# Patient Record
Sex: MALE | Race: WHITE | Employment: OTHER | URBAN - METROPOLITAN AREA
[De-identification: names, ages, dates, MRNs, and addresses within clinical notes are randomized per-mention and may not be internally consistent; named-entity substitution may affect disease eponyms.]

---

## 2018-06-08 PROBLEM — R53.83 MALAISE AND FATIGUE: Status: ACTIVE | Noted: 2018-06-08

## 2018-06-08 PROBLEM — E86.0 DEHYDRATION: Status: ACTIVE | Noted: 2018-06-08

## 2018-06-08 PROBLEM — J18.9 RIGHT MIDDLE LOBE PNEUMONIA: Status: ACTIVE | Noted: 2018-06-08

## 2018-06-08 PROBLEM — E87.5 HYPERKALEMIA: Status: ACTIVE | Noted: 2018-06-08

## 2018-06-08 PROBLEM — E11.10 DKA, TYPE 2, NOT AT GOAL (HCC): Status: ACTIVE | Noted: 2018-06-08

## 2018-06-08 PROBLEM — E87.1 HYPONATREMIA: Status: ACTIVE | Noted: 2018-06-08

## 2018-06-08 PROBLEM — R53.1 WEAKNESS: Status: ACTIVE | Noted: 2018-06-08

## 2018-06-08 PROBLEM — N17.9 AKI (ACUTE KIDNEY INJURY) (HCC): Status: ACTIVE | Noted: 2018-06-08

## 2018-06-08 PROBLEM — R53.81 MALAISE AND FATIGUE: Status: ACTIVE | Noted: 2018-06-08

## 2018-06-08 PROBLEM — E10.10 DKA, TYPE 1, NOT AT GOAL (HCC): Status: ACTIVE | Noted: 2018-06-08

## 2018-07-08 PROBLEM — E86.0 DEHYDRATION: Status: RESOLVED | Noted: 2018-06-08 | Resolved: 2018-07-08

## 2019-03-08 ENCOUNTER — APPOINTMENT (OUTPATIENT)
Dept: CT IMAGING | Age: 74
DRG: 871 | End: 2019-03-08
Payer: MEDICARE

## 2019-03-08 ENCOUNTER — HOSPITAL ENCOUNTER (INPATIENT)
Age: 74
LOS: 5 days | Discharge: HOME | DRG: 871 | End: 2019-03-13
Attending: EMERGENCY MEDICINE | Admitting: INTERNAL MEDICINE
Payer: MEDICARE

## 2019-03-08 ENCOUNTER — APPOINTMENT (OUTPATIENT)
Dept: GENERAL RADIOLOGY | Age: 74
DRG: 871 | End: 2019-03-08
Payer: MEDICARE

## 2019-03-08 DIAGNOSIS — A41.9 SEVERE SEPSIS (HCC): ICD-10-CM

## 2019-03-08 DIAGNOSIS — R53.1 GENERAL WEAKNESS: ICD-10-CM

## 2019-03-08 DIAGNOSIS — R65.20 SEVERE SEPSIS (HCC): ICD-10-CM

## 2019-03-08 DIAGNOSIS — G93.40 ACUTE ENCEPHALOPATHY: Primary | ICD-10-CM

## 2019-03-08 DIAGNOSIS — N17.9 AKI (ACUTE KIDNEY INJURY) (HCC): ICD-10-CM

## 2019-03-08 DIAGNOSIS — R91.1 PULMONARY NODULE: ICD-10-CM

## 2019-03-08 LAB
A/G RATIO: 0.6 (ref 1.1–2.2)
ALBUMIN SERPL-MCNC: 3.3 G/DL (ref 3.4–5)
ALP BLD-CCNC: 107 U/L (ref 40–129)
ALT SERPL-CCNC: 94 U/L (ref 10–40)
AMMONIA: 34 UMOL/L (ref 16–60)
AMPHETAMINE SCREEN, URINE: NORMAL
ANION GAP SERPL CALCULATED.3IONS-SCNC: 15 MMOL/L (ref 3–16)
AST SERPL-CCNC: 90 U/L (ref 15–37)
BARBITURATE SCREEN URINE: NORMAL
BASOPHILS ABSOLUTE: 0.1 K/UL (ref 0–0.2)
BASOPHILS RELATIVE PERCENT: 0.8 %
BENZODIAZEPINE SCREEN, URINE: NORMAL
BILIRUB SERPL-MCNC: 1.7 MG/DL (ref 0–1)
BILIRUBIN URINE: ABNORMAL
BLOOD, URINE: ABNORMAL
BUN BLDV-MCNC: 33 MG/DL (ref 7–20)
CALCIUM SERPL-MCNC: 9.2 MG/DL (ref 8.3–10.6)
CANNABINOID SCREEN URINE: NORMAL
CHLORIDE BLD-SCNC: 104 MMOL/L (ref 99–110)
CLARITY: ABNORMAL
CO2: 19 MMOL/L (ref 21–32)
COCAINE METABOLITE SCREEN URINE: NORMAL
COLOR: ABNORMAL
CREAT SERPL-MCNC: 1.4 MG/DL (ref 0.8–1.3)
EOSINOPHILS ABSOLUTE: 0 K/UL (ref 0–0.6)
EOSINOPHILS RELATIVE PERCENT: 0 %
EPITHELIAL CELLS, UA: 3 /HPF (ref 0–5)
ETHANOL: NORMAL MG/DL (ref 0–0.08)
GFR AFRICAN AMERICAN: >60
GFR NON-AFRICAN AMERICAN: 50
GLOBULIN: 5.7 G/DL
GLUCOSE BLD-MCNC: 153 MG/DL (ref 70–99)
GLUCOSE URINE: NEGATIVE MG/DL
GLUCOSE, CSF: 69 MG/DL (ref 40–80)
HCT VFR BLD CALC: 38.4 % (ref 40.5–52.5)
HEMOGLOBIN: 12.9 G/DL (ref 13.5–17.5)
HYALINE CASTS: 2 /LPF (ref 0–8)
INR BLD: 1.15 (ref 0.86–1.14)
KETONES, URINE: ABNORMAL MG/DL
LACTIC ACID: 1.9 MMOL/L (ref 0.4–2)
LEUKOCYTE ESTERASE, URINE: ABNORMAL
LIPASE: 9 U/L (ref 13–60)
LYMPHOCYTES ABSOLUTE: 0.7 K/UL (ref 1–5.1)
LYMPHOCYTES RELATIVE PERCENT: 10.8 %
Lab: NORMAL
MCH RBC QN AUTO: 32.5 PG (ref 26–34)
MCHC RBC AUTO-ENTMCNC: 33.5 G/DL (ref 31–36)
MCV RBC AUTO: 97 FL (ref 80–100)
METHADONE SCREEN, URINE: NORMAL
MICROSCOPIC EXAMINATION: YES
MONOCYTES ABSOLUTE: 0.6 K/UL (ref 0–1.3)
MONOCYTES RELATIVE PERCENT: 8 %
NEUTROPHILS ABSOLUTE: 5.6 K/UL (ref 1.7–7.7)
NEUTROPHILS RELATIVE PERCENT: 80.4 %
NITRITE, URINE: NEGATIVE
OPIATE SCREEN URINE: NORMAL
OXYCODONE URINE: NORMAL
PDW BLD-RTO: 14.5 % (ref 12.4–15.4)
PH UA: 6
PH UA: 6 (ref 5–8)
PHENCYCLIDINE SCREEN URINE: NORMAL
PLATELET # BLD: 75 K/UL (ref 135–450)
PLATELET SLIDE REVIEW: ABNORMAL
PMV BLD AUTO: 9.1 FL (ref 5–10.5)
POTASSIUM REFLEX MAGNESIUM: 4.8 MMOL/L (ref 3.5–5.1)
PRO-BNP: 930 PG/ML (ref 0–124)
PROPOXYPHENE SCREEN: NORMAL
PROTEIN UA: >=300 MG/DL
PROTHROMBIN TIME: 13.1 SEC (ref 9.8–13)
RAPID INFLUENZA  B AGN: NEGATIVE
RAPID INFLUENZA A AGN: NEGATIVE
RBC # BLD: 3.96 M/UL (ref 4.2–5.9)
RBC UA: 197 /HPF (ref 0–4)
REASON FOR REJECTION: NORMAL
REJECTED TEST: NORMAL
SLIDE REVIEW: ABNORMAL
SODIUM BLD-SCNC: 138 MMOL/L (ref 136–145)
SPECIFIC GRAVITY UA: 1.02 (ref 1–1.03)
TOTAL CK: 37 U/L (ref 39–308)
TOTAL PROTEIN: 9 G/DL (ref 6.4–8.2)
TROPONIN: <0.01 NG/ML
URINE REFLEX TO CULTURE: YES
URINE TYPE: ABNORMAL
UROBILINOGEN, URINE: 1 E.U./DL
WBC # BLD: 6.9 K/UL (ref 4–11)
WBC UA: 4 /HPF (ref 0–5)

## 2019-03-08 PROCEDURE — 89050 BODY FLUID CELL COUNT: CPT

## 2019-03-08 PROCEDURE — 2060000000 HC ICU INTERMEDIATE R&B

## 2019-03-08 PROCEDURE — 86789 WEST NILE VIRUS ANTIBODY: CPT

## 2019-03-08 PROCEDURE — 87804 INFLUENZA ASSAY W/OPTIC: CPT

## 2019-03-08 PROCEDURE — 74176 CT ABD & PELVIS W/O CONTRAST: CPT

## 2019-03-08 PROCEDURE — 86788 WEST NILE VIRUS AB IGM: CPT

## 2019-03-08 PROCEDURE — 87529 HSV DNA AMP PROBE: CPT

## 2019-03-08 PROCEDURE — 2580000003 HC RX 258: Performed by: EMERGENCY MEDICINE

## 2019-03-08 PROCEDURE — 96361 HYDRATE IV INFUSION ADD-ON: CPT

## 2019-03-08 PROCEDURE — 82550 ASSAY OF CK (CPK): CPT

## 2019-03-08 PROCEDURE — 85025 COMPLETE CBC W/AUTO DIFF WBC: CPT

## 2019-03-08 PROCEDURE — 82140 ASSAY OF AMMONIA: CPT

## 2019-03-08 PROCEDURE — 81001 URINALYSIS AUTO W/SCOPE: CPT

## 2019-03-08 PROCEDURE — 87070 CULTURE OTHR SPECIMN AEROBIC: CPT

## 2019-03-08 PROCEDURE — 83605 ASSAY OF LACTIC ACID: CPT

## 2019-03-08 PROCEDURE — 6370000000 HC RX 637 (ALT 250 FOR IP): Performed by: EMERGENCY MEDICINE

## 2019-03-08 PROCEDURE — 87040 BLOOD CULTURE FOR BACTERIA: CPT

## 2019-03-08 PROCEDURE — 71250 CT THORAX DX C-: CPT

## 2019-03-08 PROCEDURE — 87801 DETECT AGNT MULT DNA AMPLI: CPT

## 2019-03-08 PROCEDURE — 83690 ASSAY OF LIPASE: CPT

## 2019-03-08 PROCEDURE — 6360000002 HC RX W HCPCS: Performed by: EMERGENCY MEDICINE

## 2019-03-08 PROCEDURE — 84484 ASSAY OF TROPONIN QUANT: CPT

## 2019-03-08 PROCEDURE — 99291 CRITICAL CARE FIRST HOUR: CPT

## 2019-03-08 PROCEDURE — 70450 CT HEAD/BRAIN W/O DYE: CPT

## 2019-03-08 PROCEDURE — 87205 SMEAR GRAM STAIN: CPT

## 2019-03-08 PROCEDURE — 85610 PROTHROMBIN TIME: CPT

## 2019-03-08 PROCEDURE — 83880 ASSAY OF NATRIURETIC PEPTIDE: CPT

## 2019-03-08 PROCEDURE — 80307 DRUG TEST PRSMV CHEM ANLYZR: CPT

## 2019-03-08 PROCEDURE — 96365 THER/PROPH/DIAG IV INF INIT: CPT

## 2019-03-08 PROCEDURE — 80053 COMPREHEN METABOLIC PANEL: CPT

## 2019-03-08 PROCEDURE — 87086 URINE CULTURE/COLONY COUNT: CPT

## 2019-03-08 PROCEDURE — 82945 GLUCOSE OTHER FLUID: CPT

## 2019-03-08 PROCEDURE — 71045 X-RAY EXAM CHEST 1 VIEW: CPT

## 2019-03-08 PROCEDURE — G0480 DRUG TEST DEF 1-7 CLASSES: HCPCS

## 2019-03-08 PROCEDURE — 84157 ASSAY OF PROTEIN OTHER: CPT

## 2019-03-08 PROCEDURE — 93005 ELECTROCARDIOGRAM TRACING: CPT | Performed by: EMERGENCY MEDICINE

## 2019-03-08 RX ORDER — VANCOMYCIN HYDROCHLORIDE 1 G/200ML
15 INJECTION, SOLUTION INTRAVENOUS ONCE
Status: COMPLETED | OUTPATIENT
Start: 2019-03-08 | End: 2019-03-09

## 2019-03-08 RX ORDER — ACETAMINOPHEN 650 MG/1
650 SUPPOSITORY RECTAL ONCE
Status: COMPLETED | OUTPATIENT
Start: 2019-03-08 | End: 2019-03-08

## 2019-03-08 RX ORDER — LIDOCAINE HYDROCHLORIDE 10 MG/ML
INJECTION, SOLUTION EPIDURAL; INFILTRATION; INTRACAUDAL; PERINEURAL
Status: COMPLETED
Start: 2019-03-08 | End: 2019-03-09

## 2019-03-08 RX ORDER — 0.9 % SODIUM CHLORIDE 0.9 %
30 INTRAVENOUS SOLUTION INTRAVENOUS ONCE
Status: COMPLETED | OUTPATIENT
Start: 2019-03-08 | End: 2019-03-08

## 2019-03-08 RX ORDER — ACETAMINOPHEN 500 MG
1000 TABLET ORAL ONCE
Status: DISCONTINUED | OUTPATIENT
Start: 2019-03-08 | End: 2019-03-08

## 2019-03-08 RX ADMIN — SODIUM CHLORIDE 2124 ML: 9 INJECTION, SOLUTION INTRAVENOUS at 20:47

## 2019-03-08 RX ADMIN — ACETAMINOPHEN 650 MG: 650 SUPPOSITORY RECTAL at 20:51

## 2019-03-08 RX ADMIN — CEFEPIME HYDROCHLORIDE 2 G: 2 INJECTION, POWDER, FOR SOLUTION INTRAVENOUS at 23:40

## 2019-03-08 ASSESSMENT — PAIN DESCRIPTION - LOCATION: LOCATION: ABDOMEN

## 2019-03-08 ASSESSMENT — PAIN SCALES - WONG BAKER: WONGBAKER_NUMERICALRESPONSE: 2

## 2019-03-08 ASSESSMENT — PAIN DESCRIPTION - ORIENTATION: ORIENTATION: LOWER

## 2019-03-09 ENCOUNTER — APPOINTMENT (OUTPATIENT)
Dept: MRI IMAGING | Age: 74
DRG: 871 | End: 2019-03-09
Payer: MEDICARE

## 2019-03-09 PROBLEM — R50.9 FEVER: Status: ACTIVE | Noted: 2019-03-09

## 2019-03-09 PROBLEM — R31.9 HEMATURIA: Status: ACTIVE | Noted: 2019-03-09

## 2019-03-09 PROBLEM — D69.6 THROMBOCYTOPENIA (HCC): Status: ACTIVE | Noted: 2019-03-09

## 2019-03-09 LAB
A/G RATIO: 0.6 (ref 1.1–2.2)
ALBUMIN SERPL-MCNC: 3.1 G/DL (ref 3.4–5)
ALP BLD-CCNC: 103 U/L (ref 40–129)
ALT SERPL-CCNC: 82 U/L (ref 10–40)
ANION GAP SERPL CALCULATED.3IONS-SCNC: 13 MMOL/L (ref 3–16)
APPEARANCE CSF: ABNORMAL
APTT: 35.5 SEC (ref 26–36)
AST SERPL-CCNC: 77 U/L (ref 15–37)
BASOPHILS ABSOLUTE: 0 K/UL (ref 0–0.2)
BASOPHILS RELATIVE PERCENT: 0.7 %
BILIRUB SERPL-MCNC: 2.3 MG/DL (ref 0–1)
BLOOD SMEAR REVIEW: NORMAL
BUN BLDV-MCNC: 40 MG/DL (ref 7–20)
CALCIUM SERPL-MCNC: 8.7 MG/DL (ref 8.3–10.6)
CHLORIDE BLD-SCNC: 102 MMOL/L (ref 99–110)
CLOT EVALUATION CSF: ABNORMAL
CO2: 19 MMOL/L (ref 21–32)
COLOR CSF: ABNORMAL
CREAT SERPL-MCNC: 1.6 MG/DL (ref 0.8–1.3)
EKG ATRIAL RATE: 83 BPM
EKG DIAGNOSIS: NORMAL
EKG P AXIS: 52 DEGREES
EKG P-R INTERVAL: 122 MS
EKG Q-T INTERVAL: 424 MS
EKG QRS DURATION: 98 MS
EKG QTC CALCULATION (BAZETT): 498 MS
EKG R AXIS: 27 DEGREES
EKG T AXIS: 83 DEGREES
EKG VENTRICULAR RATE: 83 BPM
EOSINOPHILS ABSOLUTE: 0 K/UL (ref 0–0.6)
EOSINOPHILS RELATIVE PERCENT: 0.4 %
FIBRINOGEN: 313 MG/DL (ref 200–397)
GFR AFRICAN AMERICAN: 52
GFR NON-AFRICAN AMERICAN: 43
GLOBULIN: 5.5 G/DL
GLUCOSE BLD-MCNC: 146 MG/DL (ref 70–99)
GLUCOSE BLD-MCNC: 184 MG/DL (ref 70–99)
GLUCOSE BLD-MCNC: 191 MG/DL (ref 70–99)
GLUCOSE BLD-MCNC: 270 MG/DL (ref 70–99)
GLUCOSE BLD-MCNC: 275 MG/DL (ref 70–99)
GLUCOSE BLD-MCNC: 307 MG/DL (ref 70–99)
GLUCOSE BLD-MCNC: 416 MG/DL (ref 70–99)
GLUCOSE BLD-MCNC: 418 MG/DL (ref 70–99)
HCT VFR BLD CALC: 41.4 % (ref 40.5–52.5)
HEMATOLOGY PATH CONSULT: YES
HEMOGLOBIN: 13.8 G/DL (ref 13.5–17.5)
LACTATE DEHYDROGENASE: 209 U/L (ref 100–190)
LYMPHOCYTES ABSOLUTE: 0.9 K/UL (ref 1–5.1)
LYMPHOCYTES RELATIVE PERCENT: 17 %
MCH RBC QN AUTO: 32.7 PG (ref 26–34)
MCHC RBC AUTO-ENTMCNC: 33.4 G/DL (ref 31–36)
MCV RBC AUTO: 98.1 FL (ref 80–100)
MONOCYTES ABSOLUTE: 0.5 K/UL (ref 0–1.3)
MONOCYTES RELATIVE PERCENT: 9.7 %
NEUTROPHILS ABSOLUTE: 3.6 K/UL (ref 1.7–7.7)
NEUTROPHILS RELATIVE PERCENT: 72.2 %
NO DIFFERENTIAL CSF: ABNORMAL
PDW BLD-RTO: 14.2 % (ref 12.4–15.4)
PERFORMED ON: ABNORMAL
PLATELET # BLD: 67 K/UL (ref 135–450)
PLATELET SLIDE REVIEW: ABNORMAL
PMV BLD AUTO: 10 FL (ref 5–10.5)
POTASSIUM REFLEX MAGNESIUM: 4.4 MMOL/L (ref 3.5–5.1)
PROTEIN CSF: 43 MG/DL (ref 15–45)
RBC # BLD: 4.22 M/UL (ref 4.2–5.9)
RBC CSF: 1849 /CUMM
SLIDE REVIEW: ABNORMAL
SODIUM BLD-SCNC: 134 MMOL/L (ref 136–145)
TOTAL PROTEIN: 8.6 G/DL (ref 6.4–8.2)
TUBE NUMBER CSF: ABNORMAL
WBC # BLD: 5 K/UL (ref 4–11)
WBC CSF: 4 /CUMM (ref 0–5)

## 2019-03-09 PROCEDURE — 70553 MRI BRAIN STEM W/O & W/DYE: CPT

## 2019-03-09 PROCEDURE — 87581 M.PNEUMON DNA AMP PROBE: CPT

## 2019-03-09 PROCEDURE — 6360000002 HC RX W HCPCS: Performed by: EMERGENCY MEDICINE

## 2019-03-09 PROCEDURE — 85025 COMPLETE CBC W/AUTO DIFF WBC: CPT

## 2019-03-09 PROCEDURE — 85730 THROMBOPLASTIN TIME PARTIAL: CPT

## 2019-03-09 PROCEDURE — 86788 WEST NILE VIRUS AB IGM: CPT

## 2019-03-09 PROCEDURE — 85384 FIBRINOGEN ACTIVITY: CPT

## 2019-03-09 PROCEDURE — 2060000000 HC ICU INTERMEDIATE R&B

## 2019-03-09 PROCEDURE — 6370000000 HC RX 637 (ALT 250 FOR IP): Performed by: FAMILY MEDICINE

## 2019-03-09 PROCEDURE — 83615 LACTATE (LD) (LDH) ENZYME: CPT

## 2019-03-09 PROCEDURE — 83036 HEMOGLOBIN GLYCOSYLATED A1C: CPT

## 2019-03-09 PROCEDURE — 80053 COMPREHEN METABOLIC PANEL: CPT

## 2019-03-09 PROCEDURE — 82570 ASSAY OF URINE CREATININE: CPT

## 2019-03-09 PROCEDURE — 86789 WEST NILE VIRUS ANTIBODY: CPT

## 2019-03-09 PROCEDURE — 87633 RESP VIRUS 12-25 TARGETS: CPT

## 2019-03-09 PROCEDURE — 2580000003 HC RX 258: Performed by: FAMILY MEDICINE

## 2019-03-09 PROCEDURE — 87486 CHLMYD PNEUM DNA AMP PROBE: CPT

## 2019-03-09 PROCEDURE — 87798 DETECT AGENT NOS DNA AMP: CPT

## 2019-03-09 PROCEDURE — 6370000000 HC RX 637 (ALT 250 FOR IP): Performed by: NURSE PRACTITIONER

## 2019-03-09 PROCEDURE — 84300 ASSAY OF URINE SODIUM: CPT

## 2019-03-09 PROCEDURE — 2580000003 HC RX 258: Performed by: EMERGENCY MEDICINE

## 2019-03-09 PROCEDURE — 2580000003 HC RX 258: Performed by: INTERNAL MEDICINE

## 2019-03-09 PROCEDURE — 6360000004 HC RX CONTRAST MEDICATION: Performed by: INTERNAL MEDICINE

## 2019-03-09 PROCEDURE — 83010 ASSAY OF HAPTOGLOBIN QUANT: CPT

## 2019-03-09 PROCEDURE — 36415 COLL VENOUS BLD VENIPUNCTURE: CPT

## 2019-03-09 PROCEDURE — A9579 GAD-BASE MR CONTRAST NOS,1ML: HCPCS | Performed by: INTERNAL MEDICINE

## 2019-03-09 PROCEDURE — 93010 ELECTROCARDIOGRAM REPORT: CPT | Performed by: INTERNAL MEDICINE

## 2019-03-09 PROCEDURE — 6360000002 HC RX W HCPCS: Performed by: FAMILY MEDICINE

## 2019-03-09 PROCEDURE — 2500000003 HC RX 250 WO HCPCS

## 2019-03-09 PROCEDURE — 99223 1ST HOSP IP/OBS HIGH 75: CPT | Performed by: PSYCHIATRY & NEUROLOGY

## 2019-03-09 RX ORDER — SODIUM CHLORIDE 0.9 % (FLUSH) 0.9 %
10 SYRINGE (ML) INJECTION PRN
Status: DISCONTINUED | OUTPATIENT
Start: 2019-03-09 | End: 2019-03-13 | Stop reason: HOSPADM

## 2019-03-09 RX ORDER — MAGNESIUM SULFATE 1 G/100ML
1 INJECTION INTRAVENOUS PRN
Status: DISCONTINUED | OUTPATIENT
Start: 2019-03-09 | End: 2019-03-09 | Stop reason: SDUPTHER

## 2019-03-09 RX ORDER — POTASSIUM CHLORIDE 7.45 MG/ML
10 INJECTION INTRAVENOUS PRN
Status: DISCONTINUED | OUTPATIENT
Start: 2019-03-09 | End: 2019-03-09 | Stop reason: SDUPTHER

## 2019-03-09 RX ORDER — MAGNESIUM SULFATE 1 G/100ML
1 INJECTION INTRAVENOUS PRN
Status: DISCONTINUED | OUTPATIENT
Start: 2019-03-09 | End: 2019-03-13 | Stop reason: HOSPADM

## 2019-03-09 RX ORDER — SIMVASTATIN 40 MG
40 TABLET ORAL NIGHTLY
Status: DISCONTINUED | OUTPATIENT
Start: 2019-03-09 | End: 2019-03-13 | Stop reason: HOSPADM

## 2019-03-09 RX ORDER — DEXTROSE MONOHYDRATE 50 MG/ML
100 INJECTION, SOLUTION INTRAVENOUS PRN
Status: DISCONTINUED | OUTPATIENT
Start: 2019-03-09 | End: 2019-03-13 | Stop reason: HOSPADM

## 2019-03-09 RX ORDER — ONDANSETRON 2 MG/ML
4 INJECTION INTRAMUSCULAR; INTRAVENOUS EVERY 6 HOURS PRN
Status: DISCONTINUED | OUTPATIENT
Start: 2019-03-09 | End: 2019-03-09 | Stop reason: SDUPTHER

## 2019-03-09 RX ORDER — SODIUM CHLORIDE 0.9 % (FLUSH) 0.9 %
10 SYRINGE (ML) INJECTION EVERY 12 HOURS SCHEDULED
Status: DISCONTINUED | OUTPATIENT
Start: 2019-03-09 | End: 2019-03-13 | Stop reason: HOSPADM

## 2019-03-09 RX ORDER — POTASSIUM CHLORIDE 7.45 MG/ML
10 INJECTION INTRAVENOUS PRN
Status: DISCONTINUED | OUTPATIENT
Start: 2019-03-09 | End: 2019-03-13 | Stop reason: HOSPADM

## 2019-03-09 RX ORDER — PANTOPRAZOLE SODIUM 40 MG/1
40 TABLET, DELAYED RELEASE ORAL
Status: DISCONTINUED | OUTPATIENT
Start: 2019-03-10 | End: 2019-03-13 | Stop reason: HOSPADM

## 2019-03-09 RX ORDER — NICOTINE POLACRILEX 4 MG
15 LOZENGE BUCCAL PRN
Status: DISCONTINUED | OUTPATIENT
Start: 2019-03-09 | End: 2019-03-13 | Stop reason: HOSPADM

## 2019-03-09 RX ORDER — ONDANSETRON 2 MG/ML
4 INJECTION INTRAMUSCULAR; INTRAVENOUS EVERY 6 HOURS PRN
Status: DISCONTINUED | OUTPATIENT
Start: 2019-03-09 | End: 2019-03-13 | Stop reason: HOSPADM

## 2019-03-09 RX ORDER — DEXTROSE MONOHYDRATE 25 G/50ML
12.5 INJECTION, SOLUTION INTRAVENOUS PRN
Status: DISCONTINUED | OUTPATIENT
Start: 2019-03-09 | End: 2019-03-13 | Stop reason: HOSPADM

## 2019-03-09 RX ORDER — HYDRALAZINE HYDROCHLORIDE 20 MG/ML
10 INJECTION INTRAMUSCULAR; INTRAVENOUS EVERY 6 HOURS PRN
Status: DISCONTINUED | OUTPATIENT
Start: 2019-03-09 | End: 2019-03-13 | Stop reason: HOSPADM

## 2019-03-09 RX ADMIN — INSULIN GLARGINE 15 UNITS: 100 INJECTION, SOLUTION SUBCUTANEOUS at 21:41

## 2019-03-09 RX ADMIN — INSULIN LISPRO 6 UNITS: 100 INJECTION, SOLUTION INTRAVENOUS; SUBCUTANEOUS at 21:12

## 2019-03-09 RX ADMIN — INSULIN LISPRO 8 UNITS: 100 INJECTION, SOLUTION INTRAVENOUS; SUBCUTANEOUS at 18:23

## 2019-03-09 RX ADMIN — ACYCLOVIR SODIUM 710 MG: 50 INJECTION, SOLUTION INTRAVENOUS at 01:21

## 2019-03-09 RX ADMIN — Medication 1 G: at 12:52

## 2019-03-09 RX ADMIN — GADOTERIDOL 14 ML: 279.3 INJECTION, SOLUTION INTRAVENOUS at 10:35

## 2019-03-09 RX ADMIN — Medication 10 ML: at 21:41

## 2019-03-09 RX ADMIN — LIDOCAINE HYDROCHLORIDE 30 ML: 10 INJECTION, SOLUTION EPIDURAL; INFILTRATION; INTRACAUDAL; PERINEURAL at 00:07

## 2019-03-09 RX ADMIN — VANCOMYCIN HYDROCHLORIDE 1000 MG: 1 INJECTION, SOLUTION INTRAVENOUS at 00:09

## 2019-03-09 RX ADMIN — INSULIN LISPRO 6 UNITS: 100 INJECTION, SOLUTION INTRAVENOUS; SUBCUTANEOUS at 12:53

## 2019-03-09 RX ADMIN — INSULIN LISPRO 3 UNITS: 100 INJECTION, SOLUTION INTRAVENOUS; SUBCUTANEOUS at 21:41

## 2019-03-09 RX ADMIN — Medication 10 ML: at 10:36

## 2019-03-09 RX ADMIN — Medication 10 ML: at 11:06

## 2019-03-09 RX ADMIN — ACYCLOVIR SODIUM 725 MG: 50 INJECTION, SOLUTION INTRAVENOUS at 16:38

## 2019-03-09 ASSESSMENT — PAIN SCALES - GENERAL
PAINLEVEL_OUTOF10: 0

## 2019-03-10 LAB
ANION GAP SERPL CALCULATED.3IONS-SCNC: 13 MMOL/L (ref 3–16)
BUN BLDV-MCNC: 45 MG/DL (ref 7–20)
CALCIUM SERPL-MCNC: 8.4 MG/DL (ref 8.3–10.6)
CHLORIDE BLD-SCNC: 104 MMOL/L (ref 99–110)
CO2: 19 MMOL/L (ref 21–32)
CREAT SERPL-MCNC: 1.8 MG/DL (ref 0.8–1.3)
CREATININE URINE: 54.1 MG/DL (ref 39–259)
ESTIMATED AVERAGE GLUCOSE: 191.5 MG/DL
GFR AFRICAN AMERICAN: 45
GFR NON-AFRICAN AMERICAN: 37
GLUCOSE BLD-MCNC: 215 MG/DL (ref 70–99)
GLUCOSE BLD-MCNC: 222 MG/DL (ref 70–99)
GLUCOSE BLD-MCNC: 242 MG/DL (ref 70–99)
GLUCOSE BLD-MCNC: 261 MG/DL (ref 70–99)
GLUCOSE BLD-MCNC: 270 MG/DL (ref 70–99)
GLUCOSE BLD-MCNC: 309 MG/DL (ref 70–99)
GLUCOSE BLD-MCNC: 317 MG/DL (ref 70–99)
HAPTOGLOBIN: 71 MG/DL (ref 30–200)
HBA1C MFR BLD: 8.3 %
HCT VFR BLD CALC: 37.7 % (ref 40.5–52.5)
HEMOGLOBIN: 12.8 G/DL (ref 13.5–17.5)
MAGNESIUM: 1.9 MG/DL (ref 1.8–2.4)
MCH RBC QN AUTO: 32.1 PG (ref 26–34)
MCHC RBC AUTO-ENTMCNC: 33.9 G/DL (ref 31–36)
MCV RBC AUTO: 94.6 FL (ref 80–100)
PDW BLD-RTO: 14.1 % (ref 12.4–15.4)
PERFORMED ON: ABNORMAL
PLATELET # BLD: 78 K/UL (ref 135–450)
PMV BLD AUTO: 9.6 FL (ref 5–10.5)
POTASSIUM REFLEX MAGNESIUM: 3.5 MMOL/L (ref 3.5–5.1)
RBC # BLD: 3.98 M/UL (ref 4.2–5.9)
REPORT: NORMAL
REPORT: NORMAL
RESPIRATORY PANEL PCR: NORMAL
SODIUM BLD-SCNC: 136 MMOL/L (ref 136–145)
SODIUM URINE: 53 MMOL/L
TROPONIN: <0.01 NG/ML
TROPONIN: <0.01 NG/ML
URINE CULTURE, ROUTINE: NORMAL
WBC # BLD: 5.1 K/UL (ref 4–11)

## 2019-03-10 PROCEDURE — 6370000000 HC RX 637 (ALT 250 FOR IP): Performed by: INTERNAL MEDICINE

## 2019-03-10 PROCEDURE — 84484 ASSAY OF TROPONIN QUANT: CPT

## 2019-03-10 PROCEDURE — 6370000000 HC RX 637 (ALT 250 FOR IP): Performed by: NURSE PRACTITIONER

## 2019-03-10 PROCEDURE — 2060000000 HC ICU INTERMEDIATE R&B

## 2019-03-10 PROCEDURE — 97165 OT EVAL LOW COMPLEX 30 MIN: CPT

## 2019-03-10 PROCEDURE — 97161 PT EVAL LOW COMPLEX 20 MIN: CPT

## 2019-03-10 PROCEDURE — 99233 SBSQ HOSP IP/OBS HIGH 50: CPT | Performed by: PSYCHIATRY & NEUROLOGY

## 2019-03-10 PROCEDURE — 97535 SELF CARE MNGMENT TRAINING: CPT

## 2019-03-10 PROCEDURE — 80048 BASIC METABOLIC PNL TOTAL CA: CPT

## 2019-03-10 PROCEDURE — 93005 ELECTROCARDIOGRAM TRACING: CPT | Performed by: FAMILY MEDICINE

## 2019-03-10 PROCEDURE — 2580000003 HC RX 258: Performed by: FAMILY MEDICINE

## 2019-03-10 PROCEDURE — 2580000003 HC RX 258: Performed by: INTERNAL MEDICINE

## 2019-03-10 PROCEDURE — 97530 THERAPEUTIC ACTIVITIES: CPT

## 2019-03-10 PROCEDURE — 85027 COMPLETE CBC AUTOMATED: CPT

## 2019-03-10 PROCEDURE — 6370000000 HC RX 637 (ALT 250 FOR IP): Performed by: FAMILY MEDICINE

## 2019-03-10 PROCEDURE — 83735 ASSAY OF MAGNESIUM: CPT

## 2019-03-10 PROCEDURE — 6360000002 HC RX W HCPCS: Performed by: FAMILY MEDICINE

## 2019-03-10 PROCEDURE — 97116 GAIT TRAINING THERAPY: CPT

## 2019-03-10 PROCEDURE — 84153 ASSAY OF PSA TOTAL: CPT

## 2019-03-10 PROCEDURE — 92610 EVALUATE SWALLOWING FUNCTION: CPT

## 2019-03-10 PROCEDURE — 36415 COLL VENOUS BLD VENIPUNCTURE: CPT

## 2019-03-10 RX ORDER — SODIUM CHLORIDE 9 MG/ML
1000 INJECTION, SOLUTION INTRAVENOUS CONTINUOUS
Status: DISCONTINUED | OUTPATIENT
Start: 2019-03-10 | End: 2019-03-11

## 2019-03-10 RX ORDER — AMLODIPINE BESYLATE 5 MG/1
10 TABLET ORAL DAILY
Status: DISCONTINUED | OUTPATIENT
Start: 2019-03-10 | End: 2019-03-13 | Stop reason: HOSPADM

## 2019-03-10 RX ADMIN — INSULIN LISPRO 9 UNITS: 100 INJECTION, SOLUTION INTRAVENOUS; SUBCUTANEOUS at 12:18

## 2019-03-10 RX ADMIN — SIMVASTATIN 40 MG: 40 TABLET, FILM COATED ORAL at 21:32

## 2019-03-10 RX ADMIN — HYDRALAZINE HYDROCHLORIDE 10 MG: 20 INJECTION INTRAMUSCULAR; INTRAVENOUS at 08:24

## 2019-03-10 RX ADMIN — INSULIN LISPRO 6 UNITS: 100 INJECTION, SOLUTION INTRAVENOUS; SUBCUTANEOUS at 08:26

## 2019-03-10 RX ADMIN — ASPIRIN 325 MG: 325 TABLET, DELAYED RELEASE ORAL at 18:16

## 2019-03-10 RX ADMIN — SODIUM CHLORIDE 1000 ML: 9 INJECTION, SOLUTION INTRAVENOUS at 10:41

## 2019-03-10 RX ADMIN — SODIUM CHLORIDE 1000 ML: 9 INJECTION, SOLUTION INTRAVENOUS at 21:34

## 2019-03-10 RX ADMIN — Medication 10 ML: at 16:19

## 2019-03-10 RX ADMIN — INSULIN LISPRO 12 UNITS: 100 INJECTION, SOLUTION INTRAVENOUS; SUBCUTANEOUS at 17:43

## 2019-03-10 RX ADMIN — AMLODIPINE BESYLATE 10 MG: 5 TABLET ORAL at 21:32

## 2019-03-10 RX ADMIN — Medication 10 ML: at 21:33

## 2019-03-10 RX ADMIN — INSULIN LISPRO 6 UNITS: 100 INJECTION, SOLUTION INTRAVENOUS; SUBCUTANEOUS at 03:02

## 2019-03-10 RX ADMIN — Medication 10 ML: at 08:25

## 2019-03-10 RX ADMIN — ACYCLOVIR SODIUM 725 MG: 50 INJECTION, SOLUTION INTRAVENOUS at 05:11

## 2019-03-10 RX ADMIN — SIMVASTATIN 40 MG: 40 TABLET, FILM COATED ORAL at 00:17

## 2019-03-10 RX ADMIN — PANTOPRAZOLE SODIUM 40 MG: 40 TABLET, DELAYED RELEASE ORAL at 08:24

## 2019-03-10 RX ADMIN — Medication 10 ML: at 21:34

## 2019-03-10 RX ADMIN — Medication 1 G: at 12:33

## 2019-03-10 RX ADMIN — INSULIN LISPRO 5 UNITS: 100 INJECTION, SOLUTION INTRAVENOUS; SUBCUTANEOUS at 21:36

## 2019-03-10 RX ADMIN — INSULIN LISPRO 10 UNITS: 100 INJECTION, SOLUTION INTRAVENOUS; SUBCUTANEOUS at 00:15

## 2019-03-10 RX ADMIN — HYDRALAZINE HYDROCHLORIDE 10 MG: 20 INJECTION INTRAMUSCULAR; INTRAVENOUS at 16:19

## 2019-03-10 ASSESSMENT — PAIN SCALES - GENERAL
PAINLEVEL_OUTOF10: 0

## 2019-03-11 PROBLEM — E11.8 TYPE 2 DIABETES MELLITUS WITH COMPLICATION (HCC): Status: ACTIVE | Noted: 2018-06-08

## 2019-03-11 LAB
ANION GAP SERPL CALCULATED.3IONS-SCNC: 13 MMOL/L (ref 3–16)
BASOPHILS ABSOLUTE: 0 K/UL (ref 0–0.2)
BASOPHILS RELATIVE PERCENT: 1 %
BUN BLDV-MCNC: 40 MG/DL (ref 7–20)
CALCIUM SERPL-MCNC: 8.3 MG/DL (ref 8.3–10.6)
CHLORIDE BLD-SCNC: 108 MMOL/L (ref 99–110)
CO2: 19 MMOL/L (ref 21–32)
CREAT SERPL-MCNC: 1.6 MG/DL (ref 0.8–1.3)
CSF CULTURE: ABNORMAL
CSF CULTURE: ABNORMAL
EKG ATRIAL RATE: 82 BPM
EKG DIAGNOSIS: NORMAL
EKG P AXIS: 56 DEGREES
EKG P-R INTERVAL: 128 MS
EKG Q-T INTERVAL: 402 MS
EKG QRS DURATION: 100 MS
EKG QTC CALCULATION (BAZETT): 469 MS
EKG R AXIS: 38 DEGREES
EKG T AXIS: -14 DEGREES
EKG VENTRICULAR RATE: 82 BPM
EOSINOPHILS ABSOLUTE: 0.1 K/UL (ref 0–0.6)
EOSINOPHILS RELATIVE PERCENT: 2.5 %
GFR AFRICAN AMERICAN: 52
GFR NON-AFRICAN AMERICAN: 43
GLUCOSE BLD-MCNC: 168 MG/DL (ref 70–99)
GLUCOSE BLD-MCNC: 178 MG/DL (ref 70–99)
GLUCOSE BLD-MCNC: 184 MG/DL (ref 70–99)
GLUCOSE BLD-MCNC: 231 MG/DL (ref 70–99)
GLUCOSE BLD-MCNC: 246 MG/DL (ref 70–99)
GLUCOSE BLD-MCNC: 324 MG/DL (ref 70–99)
GRAM STAIN RESULT: ABNORMAL
HCT VFR BLD CALC: 35.3 % (ref 40.5–52.5)
HEMATOLOGY PATH CONSULT: NORMAL
HEMOGLOBIN: 11.8 G/DL (ref 13.5–17.5)
HERPES SIMPLEX VIRUS BY PCR: NOT DETECTED
HSV SOURCE: NORMAL
LYMPHOCYTES ABSOLUTE: 1 K/UL (ref 1–5.1)
LYMPHOCYTES RELATIVE PERCENT: 24.2 %
MCH RBC QN AUTO: 32.1 PG (ref 26–34)
MCHC RBC AUTO-ENTMCNC: 33.4 G/DL (ref 31–36)
MCV RBC AUTO: 96.1 FL (ref 80–100)
MONOCYTES ABSOLUTE: 0.4 K/UL (ref 0–1.3)
MONOCYTES RELATIVE PERCENT: 9.5 %
NEUTROPHILS ABSOLUTE: 2.5 K/UL (ref 1.7–7.7)
NEUTROPHILS RELATIVE PERCENT: 62.8 %
ORGANISM: ABNORMAL
PDW BLD-RTO: 14.5 % (ref 12.4–15.4)
PERFORMED ON: ABNORMAL
PLATELET # BLD: 88 K/UL (ref 135–450)
PMV BLD AUTO: 9.4 FL (ref 5–10.5)
POTASSIUM SERPL-SCNC: 3.5 MMOL/L (ref 3.5–5.1)
PROSTATE SPECIFIC ANTIGEN: 0.67 NG/ML (ref 0–4)
RBC # BLD: 3.68 M/UL (ref 4.2–5.9)
SODIUM BLD-SCNC: 140 MMOL/L (ref 136–145)
TROPONIN: <0.01 NG/ML
WBC # BLD: 4 K/UL (ref 4–11)

## 2019-03-11 PROCEDURE — 2580000003 HC RX 258: Performed by: INTERNAL MEDICINE

## 2019-03-11 PROCEDURE — 6360000002 HC RX W HCPCS: Performed by: FAMILY MEDICINE

## 2019-03-11 PROCEDURE — 6370000000 HC RX 637 (ALT 250 FOR IP): Performed by: FAMILY MEDICINE

## 2019-03-11 PROCEDURE — 85025 COMPLETE CBC W/AUTO DIFF WBC: CPT

## 2019-03-11 PROCEDURE — 80048 BASIC METABOLIC PNL TOTAL CA: CPT

## 2019-03-11 PROCEDURE — 99223 1ST HOSP IP/OBS HIGH 75: CPT | Performed by: INTERNAL MEDICINE

## 2019-03-11 PROCEDURE — 36415 COLL VENOUS BLD VENIPUNCTURE: CPT

## 2019-03-11 PROCEDURE — 2060000000 HC ICU INTERMEDIATE R&B

## 2019-03-11 PROCEDURE — 99232 SBSQ HOSP IP/OBS MODERATE 35: CPT | Performed by: PSYCHIATRY & NEUROLOGY

## 2019-03-11 PROCEDURE — 93010 ELECTROCARDIOGRAM REPORT: CPT | Performed by: INTERNAL MEDICINE

## 2019-03-11 PROCEDURE — 6370000000 HC RX 637 (ALT 250 FOR IP): Performed by: INTERNAL MEDICINE

## 2019-03-11 PROCEDURE — 84484 ASSAY OF TROPONIN QUANT: CPT

## 2019-03-11 RX ORDER — VANCOMYCIN HYDROCHLORIDE 1 G/200ML
1000 INJECTION, SOLUTION INTRAVENOUS EVERY 24 HOURS
Status: DISCONTINUED | OUTPATIENT
Start: 2019-03-11 | End: 2019-03-11

## 2019-03-11 RX ORDER — CARVEDILOL 6.25 MG/1
12.5 TABLET ORAL 2 TIMES DAILY WITH MEALS
Status: DISCONTINUED | OUTPATIENT
Start: 2019-03-11 | End: 2019-03-13 | Stop reason: HOSPADM

## 2019-03-11 RX ORDER — SODIUM CHLORIDE 9 MG/ML
1000 INJECTION, SOLUTION INTRAVENOUS CONTINUOUS
Status: DISCONTINUED | OUTPATIENT
Start: 2019-03-11 | End: 2019-03-13

## 2019-03-11 RX ADMIN — INSULIN LISPRO 6 UNITS: 100 INJECTION, SOLUTION INTRAVENOUS; SUBCUTANEOUS at 17:20

## 2019-03-11 RX ADMIN — CARVEDILOL 12.5 MG: 6.25 TABLET, FILM COATED ORAL at 17:24

## 2019-03-11 RX ADMIN — INSULIN LISPRO 3 UNITS: 100 INJECTION, SOLUTION INTRAVENOUS; SUBCUTANEOUS at 09:33

## 2019-03-11 RX ADMIN — HYDRALAZINE HYDROCHLORIDE 10 MG: 20 INJECTION INTRAMUSCULAR; INTRAVENOUS at 09:31

## 2019-03-11 RX ADMIN — CARVEDILOL 12.5 MG: 6.25 TABLET, FILM COATED ORAL at 12:54

## 2019-03-11 RX ADMIN — AMLODIPINE BESYLATE 10 MG: 5 TABLET ORAL at 21:33

## 2019-03-11 RX ADMIN — INSULIN LISPRO 6 UNITS: 100 INJECTION, SOLUTION INTRAVENOUS; SUBCUTANEOUS at 12:59

## 2019-03-11 RX ADMIN — SIMVASTATIN 40 MG: 40 TABLET, FILM COATED ORAL at 21:33

## 2019-03-11 RX ADMIN — Medication 10 ML: at 09:31

## 2019-03-11 RX ADMIN — PANTOPRAZOLE SODIUM 40 MG: 40 TABLET, DELAYED RELEASE ORAL at 06:00

## 2019-03-11 RX ADMIN — SODIUM CHLORIDE 1000 ML: 9 INJECTION, SOLUTION INTRAVENOUS at 07:21

## 2019-03-11 RX ADMIN — INSULIN LISPRO 6 UNITS: 100 INJECTION, SOLUTION INTRAVENOUS; SUBCUTANEOUS at 21:34

## 2019-03-11 ASSESSMENT — ENCOUNTER SYMPTOMS
EYE DISCHARGE: 0
COUGH: 0
WHEEZING: 0
BACK PAIN: 0
TROUBLE SWALLOWING: 0
DIARRHEA: 0
NAUSEA: 0
SORE THROAT: 0
RHINORRHEA: 0
SHORTNESS OF BREATH: 0
EYE REDNESS: 0
CONSTIPATION: 0
ABDOMINAL PAIN: 0

## 2019-03-11 ASSESSMENT — PAIN SCALES - GENERAL
PAINLEVEL_OUTOF10: 0

## 2019-03-12 LAB
A/G RATIO: 0.7 (ref 1.1–2.2)
ALBUMIN SERPL-MCNC: 2.5 G/DL (ref 3.4–5)
ALP BLD-CCNC: 80 U/L (ref 40–129)
ALT SERPL-CCNC: 61 U/L (ref 10–40)
ANION GAP SERPL CALCULATED.3IONS-SCNC: 10 MMOL/L (ref 3–16)
ANION GAP SERPL CALCULATED.3IONS-SCNC: 11 MMOL/L (ref 3–16)
AST SERPL-CCNC: 59 U/L (ref 15–37)
BASOPHILS ABSOLUTE: 0 K/UL (ref 0–0.2)
BASOPHILS RELATIVE PERCENT: 0.9 %
BILIRUB SERPL-MCNC: 0.7 MG/DL (ref 0–1)
BILIRUBIN DIRECT: 0.3 MG/DL (ref 0–0.3)
BILIRUBIN, INDIRECT: 0.4 MG/DL (ref 0–1)
BUN BLDV-MCNC: 38 MG/DL (ref 7–20)
BUN BLDV-MCNC: 39 MG/DL (ref 7–20)
CALCIUM SERPL-MCNC: 7.8 MG/DL (ref 8.3–10.6)
CALCIUM SERPL-MCNC: 8.2 MG/DL (ref 8.3–10.6)
CHLORIDE BLD-SCNC: 109 MMOL/L (ref 99–110)
CHLORIDE BLD-SCNC: 111 MMOL/L (ref 99–110)
CO2: 18 MMOL/L (ref 21–32)
CO2: 19 MMOL/L (ref 21–32)
CREAT SERPL-MCNC: 1.5 MG/DL (ref 0.8–1.3)
CREAT SERPL-MCNC: 1.5 MG/DL (ref 0.8–1.3)
CULTURE, BLOOD 2: ABNORMAL
EOSINOPHILS ABSOLUTE: 0.1 K/UL (ref 0–0.6)
EOSINOPHILS RELATIVE PERCENT: 3.4 %
FERRITIN: 256.6 NG/ML (ref 30–400)
GFR AFRICAN AMERICAN: 56
GFR AFRICAN AMERICAN: 56
GFR NON-AFRICAN AMERICAN: 46
GFR NON-AFRICAN AMERICAN: 46
GLOBULIN: 3.8 G/DL
GLUCOSE BLD-MCNC: 173 MG/DL (ref 70–99)
GLUCOSE BLD-MCNC: 225 MG/DL (ref 70–99)
GLUCOSE BLD-MCNC: 228 MG/DL (ref 70–99)
GLUCOSE BLD-MCNC: 232 MG/DL (ref 70–99)
GLUCOSE BLD-MCNC: 278 MG/DL (ref 70–99)
GLUCOSE BLD-MCNC: 289 MG/DL (ref 70–99)
GLUCOSE BLD-MCNC: 293 MG/DL (ref 70–99)
HAPTOGLOBIN: 69 MG/DL (ref 30–200)
HCT VFR BLD CALC: 31 % (ref 40.5–52.5)
HEMOGLOBIN: 10.5 G/DL (ref 13.5–17.5)
IRON SATURATION: 43 % (ref 20–50)
IRON: 89 UG/DL (ref 59–158)
LACTATE DEHYDROGENASE: 133 U/L (ref 100–190)
LYMPHOCYTES ABSOLUTE: 0.9 K/UL (ref 1–5.1)
LYMPHOCYTES RELATIVE PERCENT: 28.2 %
MCH RBC QN AUTO: 32.4 PG (ref 26–34)
MCHC RBC AUTO-ENTMCNC: 33.7 G/DL (ref 31–36)
MCV RBC AUTO: 96 FL (ref 80–100)
MONOCYTES ABSOLUTE: 0.3 K/UL (ref 0–1.3)
MONOCYTES RELATIVE PERCENT: 9.6 %
NEUTROPHILS ABSOLUTE: 1.8 K/UL (ref 1.7–7.7)
NEUTROPHILS RELATIVE PERCENT: 57.9 %
ORGANISM: ABNORMAL
ORGANISM: ABNORMAL
PDW BLD-RTO: 14.2 % (ref 12.4–15.4)
PERFORMED ON: ABNORMAL
PLATELET # BLD: 88 K/UL (ref 135–450)
PMV BLD AUTO: 9.4 FL (ref 5–10.5)
POTASSIUM SERPL-SCNC: 3.7 MMOL/L (ref 3.5–5.1)
POTASSIUM SERPL-SCNC: 3.8 MMOL/L (ref 3.5–5.1)
RBC # BLD: 3.23 M/UL (ref 4.2–5.9)
SODIUM BLD-SCNC: 138 MMOL/L (ref 136–145)
SODIUM BLD-SCNC: 140 MMOL/L (ref 136–145)
TOTAL IRON BINDING CAPACITY: 208 UG/DL (ref 260–445)
WBC # BLD: 3.2 K/UL (ref 4–11)

## 2019-03-12 PROCEDURE — 83010 ASSAY OF HAPTOGLOBIN QUANT: CPT

## 2019-03-12 PROCEDURE — 82248 BILIRUBIN DIRECT: CPT

## 2019-03-12 PROCEDURE — 2580000003 HC RX 258: Performed by: INTERNAL MEDICINE

## 2019-03-12 PROCEDURE — 6370000000 HC RX 637 (ALT 250 FOR IP): Performed by: INTERNAL MEDICINE

## 2019-03-12 PROCEDURE — 84165 PROTEIN E-PHORESIS SERUM: CPT

## 2019-03-12 PROCEDURE — 85025 COMPLETE CBC W/AUTO DIFF WBC: CPT

## 2019-03-12 PROCEDURE — 83550 IRON BINDING TEST: CPT

## 2019-03-12 PROCEDURE — 86334 IMMUNOFIX E-PHORESIS SERUM: CPT

## 2019-03-12 PROCEDURE — 36415 COLL VENOUS BLD VENIPUNCTURE: CPT

## 2019-03-12 PROCEDURE — 83615 LACTATE (LD) (LDH) ENZYME: CPT

## 2019-03-12 PROCEDURE — 99232 SBSQ HOSP IP/OBS MODERATE 35: CPT | Performed by: INTERNAL MEDICINE

## 2019-03-12 PROCEDURE — 83540 ASSAY OF IRON: CPT

## 2019-03-12 PROCEDURE — 82728 ASSAY OF FERRITIN: CPT

## 2019-03-12 PROCEDURE — 97110 THERAPEUTIC EXERCISES: CPT

## 2019-03-12 PROCEDURE — 6370000000 HC RX 637 (ALT 250 FOR IP): Performed by: FAMILY MEDICINE

## 2019-03-12 PROCEDURE — 84155 ASSAY OF PROTEIN SERUM: CPT

## 2019-03-12 PROCEDURE — 2060000000 HC ICU INTERMEDIATE R&B

## 2019-03-12 PROCEDURE — 97116 GAIT TRAINING THERAPY: CPT

## 2019-03-12 PROCEDURE — 80053 COMPREHEN METABOLIC PANEL: CPT

## 2019-03-12 PROCEDURE — 97530 THERAPEUTIC ACTIVITIES: CPT

## 2019-03-12 RX ADMIN — Medication 10 ML: at 20:31

## 2019-03-12 RX ADMIN — INSULIN LISPRO 3 UNITS: 100 INJECTION, SOLUTION INTRAVENOUS; SUBCUTANEOUS at 09:58

## 2019-03-12 RX ADMIN — INSULIN LISPRO 6 UNITS: 100 INJECTION, SOLUTION INTRAVENOUS; SUBCUTANEOUS at 17:32

## 2019-03-12 RX ADMIN — PANTOPRAZOLE SODIUM 40 MG: 40 TABLET, DELAYED RELEASE ORAL at 06:25

## 2019-03-12 RX ADMIN — CARVEDILOL 12.5 MG: 6.25 TABLET, FILM COATED ORAL at 09:59

## 2019-03-12 RX ADMIN — INSULIN LISPRO 5 UNITS: 100 INJECTION, SOLUTION INTRAVENOUS; SUBCUTANEOUS at 20:40

## 2019-03-12 RX ADMIN — SODIUM CHLORIDE 1000 ML: 9 INJECTION, SOLUTION INTRAVENOUS at 12:00

## 2019-03-12 RX ADMIN — Medication 10 ML: at 09:59

## 2019-03-12 RX ADMIN — INSULIN LISPRO 9 UNITS: 100 INJECTION, SOLUTION INTRAVENOUS; SUBCUTANEOUS at 12:46

## 2019-03-12 RX ADMIN — AMLODIPINE BESYLATE 10 MG: 5 TABLET ORAL at 20:30

## 2019-03-12 RX ADMIN — Medication 10 ML: at 10:03

## 2019-03-12 RX ADMIN — CARVEDILOL 12.5 MG: 6.25 TABLET, FILM COATED ORAL at 17:30

## 2019-03-12 RX ADMIN — SIMVASTATIN 40 MG: 40 TABLET, FILM COATED ORAL at 20:30

## 2019-03-12 ASSESSMENT — ENCOUNTER SYMPTOMS
ABDOMINAL PAIN: 0
SORE THROAT: 0
NAUSEA: 0
CONSTIPATION: 0
SHORTNESS OF BREATH: 0
COUGH: 0
WHEEZING: 0
DIARRHEA: 0
TROUBLE SWALLOWING: 0
BACK PAIN: 0
EYE REDNESS: 0
RHINORRHEA: 0
EYE DISCHARGE: 0

## 2019-03-12 ASSESSMENT — PAIN SCALES - GENERAL: PAINLEVEL_OUTOF10: 0

## 2019-03-13 VITALS
RESPIRATION RATE: 16 BRPM | HEIGHT: 69 IN | OXYGEN SATURATION: 92 % | DIASTOLIC BLOOD PRESSURE: 64 MMHG | HEART RATE: 63 BPM | SYSTOLIC BLOOD PRESSURE: 138 MMHG | TEMPERATURE: 97.9 F | WEIGHT: 162.7 LBS | BODY MASS INDEX: 24.1 KG/M2

## 2019-03-13 LAB
ALBUMIN SERPL-MCNC: 2.2 G/DL (ref 3.1–4.9)
ALBUMIN SERPL-MCNC: 2.7 G/DL (ref 3.4–5)
ALP BLD-CCNC: 84 U/L (ref 40–129)
ALPHA-1-GLOBULIN: 0.3 G/DL (ref 0.2–0.4)
ALPHA-2-GLOBULIN: 0.6 G/DL (ref 0.4–1.1)
ALT SERPL-CCNC: 59 U/L (ref 10–40)
ANION GAP SERPL CALCULATED.3IONS-SCNC: 11 MMOL/L (ref 3–16)
AST SERPL-CCNC: 54 U/L (ref 15–37)
BASOPHILS ABSOLUTE: 0 K/UL (ref 0–0.2)
BASOPHILS RELATIVE PERCENT: 1.4 %
BETA GLOBULIN: 1 G/DL (ref 0.9–1.6)
BILIRUB SERPL-MCNC: 0.6 MG/DL (ref 0–1)
BILIRUBIN DIRECT: 0.3 MG/DL (ref 0–0.3)
BILIRUBIN, INDIRECT: 0.3 MG/DL (ref 0–1)
BLOOD CULTURE, ROUTINE: NORMAL
BUN BLDV-MCNC: 34 MG/DL (ref 7–20)
CALCIUM SERPL-MCNC: 8.1 MG/DL (ref 8.3–10.6)
CHLORIDE BLD-SCNC: 110 MMOL/L (ref 99–110)
CO2: 18 MMOL/L (ref 21–32)
CREAT SERPL-MCNC: 1.5 MG/DL (ref 0.8–1.3)
EOSINOPHILS ABSOLUTE: 0.1 K/UL (ref 0–0.6)
EOSINOPHILS RELATIVE PERCENT: 2.7 %
GAMMA GLOBULIN: 2.2 G/DL (ref 0.6–1.8)
GFR AFRICAN AMERICAN: 56
GFR NON-AFRICAN AMERICAN: 46
GLUCOSE BLD-MCNC: 178 MG/DL (ref 70–99)
GLUCOSE BLD-MCNC: 203 MG/DL (ref 70–99)
GLUCOSE BLD-MCNC: 207 MG/DL (ref 70–99)
GLUCOSE BLD-MCNC: 246 MG/DL (ref 70–99)
HCT VFR BLD CALC: 31.1 % (ref 40.5–52.5)
HEMOGLOBIN: 10.6 G/DL (ref 13.5–17.5)
LYMPHOCYTES ABSOLUTE: 0.9 K/UL (ref 1–5.1)
LYMPHOCYTES RELATIVE PERCENT: 28.7 %
MCH RBC QN AUTO: 32.2 PG (ref 26–34)
MCHC RBC AUTO-ENTMCNC: 34 G/DL (ref 31–36)
MCV RBC AUTO: 95 FL (ref 80–100)
MONOCYTES ABSOLUTE: 0.2 K/UL (ref 0–1.3)
MONOCYTES RELATIVE PERCENT: 7.7 %
NEUTROPHILS ABSOLUTE: 1.9 K/UL (ref 1.7–7.7)
NEUTROPHILS RELATIVE PERCENT: 59.5 %
PDW BLD-RTO: 13.9 % (ref 12.4–15.4)
PERFORMED ON: ABNORMAL
PLATELET # BLD: 88 K/UL (ref 135–450)
PMV BLD AUTO: 9.6 FL (ref 5–10.5)
POTASSIUM SERPL-SCNC: 3.8 MMOL/L (ref 3.5–5.1)
RBC # BLD: 3.28 M/UL (ref 4.2–5.9)
SODIUM BLD-SCNC: 139 MMOL/L (ref 136–145)
SPE/IFE INTERPRETATION: NORMAL
TOTAL PROTEIN: 6.3 G/DL (ref 6.4–8.2)
TOTAL PROTEIN: 6.7 G/DL (ref 6.4–8.2)
VITAMIN B-12: 636 PG/ML (ref 211–911)
WBC # BLD: 3.2 K/UL (ref 4–11)
WEST NILE IGG, CSF: 0.08 IV
WEST NILE IGM ANTIBODY CSF: 0.03 IV
WEST NILE VIRUS, IGG: 0.14 IV
WEST NILE VIRUS, IGM: 0.1 IV

## 2019-03-13 PROCEDURE — 80048 BASIC METABOLIC PNL TOTAL CA: CPT

## 2019-03-13 PROCEDURE — 2580000003 HC RX 258: Performed by: INTERNAL MEDICINE

## 2019-03-13 PROCEDURE — 87040 BLOOD CULTURE FOR BACTERIA: CPT

## 2019-03-13 PROCEDURE — 36415 COLL VENOUS BLD VENIPUNCTURE: CPT

## 2019-03-13 PROCEDURE — 6370000000 HC RX 637 (ALT 250 FOR IP): Performed by: INTERNAL MEDICINE

## 2019-03-13 PROCEDURE — 80076 HEPATIC FUNCTION PANEL: CPT

## 2019-03-13 PROCEDURE — 85025 COMPLETE CBC W/AUTO DIFF WBC: CPT

## 2019-03-13 PROCEDURE — 6370000000 HC RX 637 (ALT 250 FOR IP): Performed by: FAMILY MEDICINE

## 2019-03-13 PROCEDURE — 82607 VITAMIN B-12: CPT

## 2019-03-13 RX ORDER — AMLODIPINE BESYLATE 10 MG/1
10 TABLET ORAL DAILY
Qty: 30 TABLET | Refills: 3 | Status: ON HOLD | OUTPATIENT
Start: 2019-03-13 | End: 2019-05-05 | Stop reason: HOSPADM

## 2019-03-13 RX ORDER — CARVEDILOL 12.5 MG/1
12.5 TABLET ORAL 2 TIMES DAILY WITH MEALS
Qty: 60 TABLET | Refills: 3 | Status: SHIPPED | OUTPATIENT
Start: 2019-03-13

## 2019-03-13 RX ADMIN — CARVEDILOL 12.5 MG: 6.25 TABLET, FILM COATED ORAL at 08:37

## 2019-03-13 RX ADMIN — SODIUM CHLORIDE 1000 ML: 9 INJECTION, SOLUTION INTRAVENOUS at 08:38

## 2019-03-13 RX ADMIN — PANTOPRAZOLE SODIUM 40 MG: 40 TABLET, DELAYED RELEASE ORAL at 08:41

## 2019-03-13 RX ADMIN — CARVEDILOL 12.5 MG: 6.25 TABLET, FILM COATED ORAL at 18:26

## 2019-03-13 RX ADMIN — Medication 10 ML: at 08:46

## 2019-03-13 RX ADMIN — INSULIN LISPRO 6 UNITS: 100 INJECTION, SOLUTION INTRAVENOUS; SUBCUTANEOUS at 08:41

## 2019-03-13 RX ADMIN — Medication 10 ML: at 08:47

## 2019-03-13 RX ADMIN — INSULIN LISPRO 3 UNITS: 100 INJECTION, SOLUTION INTRAVENOUS; SUBCUTANEOUS at 11:43

## 2019-03-18 LAB — BLOOD CULTURE, ROUTINE: NORMAL

## 2019-04-02 ENCOUNTER — FOLLOWUP TELEPHONE ENCOUNTER (OUTPATIENT)
Dept: INPATIENT UNIT | Age: 74
End: 2019-04-02

## 2019-04-20 ENCOUNTER — APPOINTMENT (OUTPATIENT)
Dept: GENERAL RADIOLOGY | Age: 74
DRG: 871 | End: 2019-04-20
Payer: MEDICARE

## 2019-04-20 ENCOUNTER — HOSPITAL ENCOUNTER (INPATIENT)
Age: 74
LOS: 6 days | Discharge: HOME | DRG: 871 | End: 2019-04-26
Attending: EMERGENCY MEDICINE | Admitting: INTERNAL MEDICINE
Payer: MEDICARE

## 2019-04-20 DIAGNOSIS — N17.9 AKI (ACUTE KIDNEY INJURY) (HCC): ICD-10-CM

## 2019-04-20 DIAGNOSIS — J18.9 MULTIFOCAL PNEUMONIA: Primary | ICD-10-CM

## 2019-04-20 DIAGNOSIS — R73.9 HYPERGLYCEMIA: ICD-10-CM

## 2019-04-20 DIAGNOSIS — J96.01 ACUTE RESPIRATORY FAILURE WITH HYPOXIA (HCC): ICD-10-CM

## 2019-04-20 DIAGNOSIS — J43.1 PANLOBULAR EMPHYSEMA (HCC): ICD-10-CM

## 2019-04-20 DIAGNOSIS — J44.1 COPD EXACERBATION (HCC): ICD-10-CM

## 2019-04-20 LAB
A/G RATIO: 0.6 (ref 1.1–2.2)
ALBUMIN SERPL-MCNC: 3.5 G/DL (ref 3.4–5)
ALP BLD-CCNC: 94 U/L (ref 40–129)
ALT SERPL-CCNC: 29 U/L (ref 10–40)
ANION GAP SERPL CALCULATED.3IONS-SCNC: 12 MMOL/L (ref 3–16)
APTT: 35.3 SEC (ref 26–36)
AST SERPL-CCNC: 34 U/L (ref 15–37)
BASOPHILS ABSOLUTE: 0.1 K/UL (ref 0–0.2)
BASOPHILS RELATIVE PERCENT: 1.1 %
BILIRUB SERPL-MCNC: 0.9 MG/DL (ref 0–1)
BUN BLDV-MCNC: 21 MG/DL (ref 7–20)
CALCIUM SERPL-MCNC: 9 MG/DL (ref 8.3–10.6)
CHLORIDE BLD-SCNC: 103 MMOL/L (ref 99–110)
CO2: 19 MMOL/L (ref 21–32)
CREAT SERPL-MCNC: 1.5 MG/DL (ref 0.8–1.3)
EOSINOPHILS ABSOLUTE: 0.1 K/UL (ref 0–0.6)
EOSINOPHILS RELATIVE PERCENT: 0.7 %
GFR AFRICAN AMERICAN: 56
GFR NON-AFRICAN AMERICAN: 46
GLOBULIN: 5.4 G/DL
GLUCOSE BLD-MCNC: 263 MG/DL (ref 70–99)
GLUCOSE BLD-MCNC: 292 MG/DL (ref 70–99)
HCT VFR BLD CALC: 39.6 % (ref 40.5–52.5)
HEMOGLOBIN: 13.3 G/DL (ref 13.5–17.5)
INR BLD: 1.18 (ref 0.86–1.14)
LACTIC ACID: 1.8 MMOL/L (ref 0.4–2)
LYMPHOCYTES ABSOLUTE: 1.2 K/UL (ref 1–5.1)
LYMPHOCYTES RELATIVE PERCENT: 11.3 %
MCH RBC QN AUTO: 33 PG (ref 26–34)
MCHC RBC AUTO-ENTMCNC: 33.5 G/DL (ref 31–36)
MCV RBC AUTO: 98.4 FL (ref 80–100)
MONOCYTES ABSOLUTE: 0.5 K/UL (ref 0–1.3)
MONOCYTES RELATIVE PERCENT: 5.2 %
NEUTROPHILS ABSOLUTE: 8.6 K/UL (ref 1.7–7.7)
NEUTROPHILS RELATIVE PERCENT: 81.7 %
PDW BLD-RTO: 15.5 % (ref 12.4–15.4)
PERFORMED ON: ABNORMAL
PLATELET # BLD: 151 K/UL (ref 135–450)
PMV BLD AUTO: 9.2 FL (ref 5–10.5)
POTASSIUM SERPL-SCNC: 4.2 MMOL/L (ref 3.5–5.1)
PRO-BNP: 2378 PG/ML (ref 0–124)
PROTHROMBIN TIME: 13.4 SEC (ref 9.8–13)
RBC # BLD: 4.02 M/UL (ref 4.2–5.9)
SODIUM BLD-SCNC: 134 MMOL/L (ref 136–145)
TOTAL PROTEIN: 8.9 G/DL (ref 6.4–8.2)
TROPONIN: <0.01 NG/ML
WBC # BLD: 10.5 K/UL (ref 4–11)

## 2019-04-20 PROCEDURE — 6370000000 HC RX 637 (ALT 250 FOR IP): Performed by: PHYSICIAN ASSISTANT

## 2019-04-20 PROCEDURE — 94664 DEMO&/EVAL PT USE INHALER: CPT

## 2019-04-20 PROCEDURE — 85730 THROMBOPLASTIN TIME PARTIAL: CPT

## 2019-04-20 PROCEDURE — 96375 TX/PRO/DX INJ NEW DRUG ADDON: CPT

## 2019-04-20 PROCEDURE — 83880 ASSAY OF NATRIURETIC PEPTIDE: CPT

## 2019-04-20 PROCEDURE — 96365 THER/PROPH/DIAG IV INF INIT: CPT

## 2019-04-20 PROCEDURE — 6360000002 HC RX W HCPCS: Performed by: PHYSICIAN ASSISTANT

## 2019-04-20 PROCEDURE — 82803 BLOOD GASES ANY COMBINATION: CPT

## 2019-04-20 PROCEDURE — 99285 EMERGENCY DEPT VISIT HI MDM: CPT

## 2019-04-20 PROCEDURE — 71045 X-RAY EXAM CHEST 1 VIEW: CPT

## 2019-04-20 PROCEDURE — 84484 ASSAY OF TROPONIN QUANT: CPT

## 2019-04-20 PROCEDURE — 80053 COMPREHEN METABOLIC PANEL: CPT

## 2019-04-20 PROCEDURE — 94640 AIRWAY INHALATION TREATMENT: CPT

## 2019-04-20 PROCEDURE — 87040 BLOOD CULTURE FOR BACTERIA: CPT

## 2019-04-20 PROCEDURE — 85610 PROTHROMBIN TIME: CPT

## 2019-04-20 PROCEDURE — 2580000003 HC RX 258: Performed by: PHYSICIAN ASSISTANT

## 2019-04-20 PROCEDURE — 93005 ELECTROCARDIOGRAM TRACING: CPT | Performed by: EMERGENCY MEDICINE

## 2019-04-20 PROCEDURE — 85025 COMPLETE CBC W/AUTO DIFF WBC: CPT

## 2019-04-20 PROCEDURE — 83605 ASSAY OF LACTIC ACID: CPT

## 2019-04-20 PROCEDURE — 94660 CPAP INITIATION&MGMT: CPT

## 2019-04-20 PROCEDURE — 2060000000 HC ICU INTERMEDIATE R&B

## 2019-04-20 RX ORDER — ACETAMINOPHEN 500 MG
1000 TABLET ORAL ONCE
Status: COMPLETED | OUTPATIENT
Start: 2019-04-20 | End: 2019-04-20

## 2019-04-20 RX ORDER — IPRATROPIUM BROMIDE AND ALBUTEROL SULFATE 2.5; .5 MG/3ML; MG/3ML
1 SOLUTION RESPIRATORY (INHALATION) ONCE
Status: COMPLETED | OUTPATIENT
Start: 2019-04-20 | End: 2019-04-20

## 2019-04-20 RX ORDER — METHYLPREDNISOLONE SODIUM SUCCINATE 125 MG/2ML
125 INJECTION, POWDER, LYOPHILIZED, FOR SOLUTION INTRAMUSCULAR; INTRAVENOUS ONCE
Status: COMPLETED | OUTPATIENT
Start: 2019-04-20 | End: 2019-04-20

## 2019-04-20 RX ADMIN — METHYLPREDNISOLONE SODIUM SUCCINATE 125 MG: 125 INJECTION, POWDER, FOR SOLUTION INTRAMUSCULAR; INTRAVENOUS at 23:13

## 2019-04-20 RX ADMIN — IPRATROPIUM BROMIDE AND ALBUTEROL SULFATE 1 AMPULE: .5; 3 SOLUTION RESPIRATORY (INHALATION) at 23:20

## 2019-04-20 RX ADMIN — ACETAMINOPHEN 1000 MG: 500 TABLET, FILM COATED ORAL at 23:13

## 2019-04-20 RX ADMIN — AZITHROMYCIN MONOHYDRATE 500 MG: 500 INJECTION, POWDER, LYOPHILIZED, FOR SOLUTION INTRAVENOUS at 23:59

## 2019-04-20 RX ADMIN — Medication 1 G: at 23:59

## 2019-04-20 ASSESSMENT — ENCOUNTER SYMPTOMS
COUGH: 0
SHORTNESS OF BREATH: 1
DIARRHEA: 0
ABDOMINAL PAIN: 0
WHEEZING: 0
NAUSEA: 0
VOMITING: 0
RHINORRHEA: 0

## 2019-04-20 ASSESSMENT — PAIN SCALES - GENERAL
PAINLEVEL_OUTOF10: 5
PAINLEVEL_OUTOF10: 5

## 2019-04-20 ASSESSMENT — PAIN DESCRIPTION - LOCATION: LOCATION: BACK

## 2019-04-20 ASSESSMENT — PAIN DESCRIPTION - PAIN TYPE: TYPE: CHRONIC PAIN

## 2019-04-21 LAB
BASE EXCESS VENOUS: -3.5 MMOL/L (ref -3–3)
BILIRUBIN URINE: NEGATIVE
BLOOD, URINE: ABNORMAL
CARBOXYHEMOGLOBIN: 4.4 % (ref 0–1.5)
CLARITY: CLEAR
COLOR: YELLOW
EKG ATRIAL RATE: 89 BPM
EKG DIAGNOSIS: NORMAL
EKG P AXIS: 44 DEGREES
EKG P-R INTERVAL: 126 MS
EKG Q-T INTERVAL: 398 MS
EKG QRS DURATION: 98 MS
EKG QTC CALCULATION (BAZETT): 484 MS
EKG R AXIS: 86 DEGREES
EKG T AXIS: -21 DEGREES
EKG VENTRICULAR RATE: 89 BPM
EPITHELIAL CELLS, UA: 0 /HPF (ref 0–5)
GLUCOSE BLD-MCNC: 261 MG/DL (ref 70–99)
GLUCOSE BLD-MCNC: 265 MG/DL (ref 70–99)
GLUCOSE BLD-MCNC: 289 MG/DL (ref 70–99)
GLUCOSE BLD-MCNC: 312 MG/DL (ref 70–99)
GLUCOSE BLD-MCNC: 315 MG/DL (ref 70–99)
GLUCOSE BLD-MCNC: 400 MG/DL (ref 70–99)
GLUCOSE URINE: 500 MG/DL
HCO3 VENOUS: 19.7 MMOL/L (ref 23–29)
HYALINE CASTS: 2 /LPF (ref 0–8)
KETONES, URINE: NEGATIVE MG/DL
LEUKOCYTE ESTERASE, URINE: NEGATIVE
METHEMOGLOBIN VENOUS: 0.2 %
MICROSCOPIC EXAMINATION: YES
NITRITE, URINE: NEGATIVE
O2 CONTENT, VEN: 16 VOL %
O2 THERAPY: ABNORMAL
PCO2, VEN: 29.3 MMHG (ref 40–50)
PERFORMED ON: ABNORMAL
PH UA: 5.5 (ref 5–8)
PH VENOUS: 7.44 (ref 7.35–7.45)
PO2, VEN: 201 MMHG (ref 25–40)
PROCALCITONIN: 0.08 NG/ML (ref 0–0.15)
PROTEIN UA: 100 MG/DL
RBC UA: 10 /HPF (ref 0–4)
SPECIFIC GRAVITY UA: 1.02 (ref 1–1.03)
TCO2 CALC VENOUS: 46 MMOL/L
URINE REFLEX TO CULTURE: ABNORMAL
URINE TYPE: ABNORMAL
UROBILINOGEN, URINE: 1 E.U./DL
WBC UA: 1 /HPF (ref 0–5)

## 2019-04-21 PROCEDURE — 94640 AIRWAY INHALATION TREATMENT: CPT

## 2019-04-21 PROCEDURE — 2700000000 HC OXYGEN THERAPY PER DAY

## 2019-04-21 PROCEDURE — 81001 URINALYSIS AUTO W/SCOPE: CPT

## 2019-04-21 PROCEDURE — 2580000003 HC RX 258: Performed by: INTERNAL MEDICINE

## 2019-04-21 PROCEDURE — 6370000000 HC RX 637 (ALT 250 FOR IP): Performed by: INTERNAL MEDICINE

## 2019-04-21 PROCEDURE — 94760 N-INVAS EAR/PLS OXIMETRY 1: CPT

## 2019-04-21 PROCEDURE — 84145 PROCALCITONIN (PCT): CPT

## 2019-04-21 PROCEDURE — 2060000000 HC ICU INTERMEDIATE R&B

## 2019-04-21 PROCEDURE — 6360000002 HC RX W HCPCS: Performed by: INTERNAL MEDICINE

## 2019-04-21 PROCEDURE — 36415 COLL VENOUS BLD VENIPUNCTURE: CPT

## 2019-04-21 PROCEDURE — 93010 ELECTROCARDIOGRAM REPORT: CPT | Performed by: INTERNAL MEDICINE

## 2019-04-21 PROCEDURE — 94667 MNPJ CHEST WALL 1ST: CPT

## 2019-04-21 RX ORDER — PREGABALIN 75 MG/1
150 CAPSULE ORAL 2 TIMES DAILY
Status: DISCONTINUED | OUTPATIENT
Start: 2019-04-21 | End: 2019-04-26 | Stop reason: HOSPADM

## 2019-04-21 RX ORDER — AZELASTINE HYDROCHLORIDE 137 UG/1
1 SPRAY, METERED NASAL 2 TIMES DAILY
COMMUNITY

## 2019-04-21 RX ORDER — SODIUM CHLORIDE 0.9 % (FLUSH) 0.9 %
10 SYRINGE (ML) INJECTION PRN
Status: DISCONTINUED | OUTPATIENT
Start: 2019-04-21 | End: 2019-04-26 | Stop reason: HOSPADM

## 2019-04-21 RX ORDER — ATORVASTATIN CALCIUM 40 MG/1
20 TABLET, FILM COATED ORAL NIGHTLY
COMMUNITY

## 2019-04-21 RX ORDER — ASPIRIN 81 MG/1
81 TABLET ORAL DAILY
Status: DISCONTINUED | OUTPATIENT
Start: 2019-04-21 | End: 2019-04-26 | Stop reason: HOSPADM

## 2019-04-21 RX ORDER — AZITHROMYCIN 250 MG/1
250 TABLET, FILM COATED ORAL DAILY
Status: DISCONTINUED | OUTPATIENT
Start: 2019-04-22 | End: 2019-04-26 | Stop reason: HOSPADM

## 2019-04-21 RX ORDER — CARVEDILOL 6.25 MG/1
12.5 TABLET ORAL 2 TIMES DAILY WITH MEALS
Status: DISCONTINUED | OUTPATIENT
Start: 2019-04-21 | End: 2019-04-26 | Stop reason: HOSPADM

## 2019-04-21 RX ORDER — SODIUM CHLORIDE 0.9 % (FLUSH) 0.9 %
10 SYRINGE (ML) INJECTION EVERY 12 HOURS SCHEDULED
Status: DISCONTINUED | OUTPATIENT
Start: 2019-04-21 | End: 2019-04-26 | Stop reason: HOSPADM

## 2019-04-21 RX ORDER — AMLODIPINE BESYLATE 5 MG/1
10 TABLET ORAL DAILY
Status: DISCONTINUED | OUTPATIENT
Start: 2019-04-21 | End: 2019-04-26 | Stop reason: HOSPADM

## 2019-04-21 RX ORDER — SIMVASTATIN 40 MG
80 TABLET ORAL NIGHTLY
Status: DISCONTINUED | OUTPATIENT
Start: 2019-04-21 | End: 2019-04-21 | Stop reason: CLARIF

## 2019-04-21 RX ORDER — IPRATROPIUM BROMIDE AND ALBUTEROL SULFATE 2.5; .5 MG/3ML; MG/3ML
1 SOLUTION RESPIRATORY (INHALATION)
Status: DISCONTINUED | OUTPATIENT
Start: 2019-04-21 | End: 2019-04-26 | Stop reason: HOSPADM

## 2019-04-21 RX ORDER — PANTOPRAZOLE SODIUM 40 MG/1
40 TABLET, DELAYED RELEASE ORAL
Status: DISCONTINUED | OUTPATIENT
Start: 2019-04-21 | End: 2019-04-26 | Stop reason: HOSPADM

## 2019-04-21 RX ORDER — MOXIFLOXACIN HYDROCHLORIDE 400 MG/1
400 TABLET ORAL DAILY
Status: ON HOLD | COMMUNITY
Start: 2019-04-11 | End: 2019-04-24 | Stop reason: HOSPADM

## 2019-04-21 RX ORDER — ROSUVASTATIN CALCIUM 20 MG/1
20 TABLET, COATED ORAL NIGHTLY
Status: DISCONTINUED | OUTPATIENT
Start: 2019-04-21 | End: 2019-04-26 | Stop reason: HOSPADM

## 2019-04-21 RX ORDER — ONDANSETRON 2 MG/ML
4 INJECTION INTRAMUSCULAR; INTRAVENOUS EVERY 6 HOURS PRN
Status: DISCONTINUED | OUTPATIENT
Start: 2019-04-21 | End: 2019-04-26 | Stop reason: HOSPADM

## 2019-04-21 RX ADMIN — CARVEDILOL 12.5 MG: 6.25 TABLET, FILM COATED ORAL at 10:02

## 2019-04-21 RX ADMIN — Medication 10 ML: at 10:03

## 2019-04-21 RX ADMIN — IPRATROPIUM BROMIDE AND ALBUTEROL SULFATE 1 AMPULE: .5; 3 SOLUTION RESPIRATORY (INHALATION) at 08:10

## 2019-04-21 RX ADMIN — AMLODIPINE BESYLATE 10 MG: 5 TABLET ORAL at 10:24

## 2019-04-21 RX ADMIN — Medication 10 ML: at 21:19

## 2019-04-21 RX ADMIN — CARVEDILOL 12.5 MG: 6.25 TABLET, FILM COATED ORAL at 16:46

## 2019-04-21 RX ADMIN — ASPIRIN 81 MG: 81 TABLET, COATED ORAL at 10:03

## 2019-04-21 RX ADMIN — PANTOPRAZOLE SODIUM 40 MG: 40 TABLET, DELAYED RELEASE ORAL at 06:41

## 2019-04-21 RX ADMIN — IPRATROPIUM BROMIDE AND ALBUTEROL SULFATE 1 AMPULE: .5; 3 SOLUTION RESPIRATORY (INHALATION) at 16:07

## 2019-04-21 RX ADMIN — IPRATROPIUM BROMIDE AND ALBUTEROL SULFATE 1 AMPULE: .5; 3 SOLUTION RESPIRATORY (INHALATION) at 11:57

## 2019-04-21 RX ADMIN — ENOXAPARIN SODIUM 40 MG: 40 INJECTION SUBCUTANEOUS at 10:03

## 2019-04-21 RX ADMIN — IPRATROPIUM BROMIDE AND ALBUTEROL SULFATE 1 AMPULE: .5; 3 SOLUTION RESPIRATORY (INHALATION) at 20:01

## 2019-04-21 ASSESSMENT — PAIN SCALES - GENERAL
PAINLEVEL_OUTOF10: 0
PAINLEVEL_OUTOF10: 3
PAINLEVEL_OUTOF10: 3
PAINLEVEL_OUTOF10: 4

## 2019-04-21 ASSESSMENT — PAIN DESCRIPTION - PAIN TYPE
TYPE: CHRONIC PAIN
TYPE: CHRONIC PAIN;OTHER (COMMENT)

## 2019-04-21 NOTE — ED PROVIDER NOTES
Cardiovascular: Negative for chest pain. Gastrointestinal: Negative for abdominal pain, diarrhea, nausea and vomiting. Genitourinary: Negative for difficulty urinating, dysuria and hematuria. Musculoskeletal: Negative for neck pain and neck stiffness. Skin: Negative for rash. Neurological: Negative for dizziness, syncope, weakness, light-headedness and headaches. Positives and Pertinent negatives as per HPI. Except as noted abovein the ROS, all other systems were reviewed and negative.        PAST MEDICAL HISTORY     Past Medical History:   Diagnosis Date    Arthritis     CAD (coronary artery disease)     Cancer ([address]     Chest pain     COPD (chronic obstructive pulmonary disease) ([address]     Diabetes mellitus ([address]     pump patient    Fibromyalgia     takes Lyrica for fibromyalgia and diabetic nerve pain    GERD (gastroesophageal reflux disease)     Heart [facility] ([facility] 1991    1st heart attack (MI) 1991, 2nd heart attack (MI) pt does not recall    History of open heart surgery     Triple Vessel Disease at [place] ([facility])    Hyperlipidemia     Hypertension     Peripheral vascular disease ([facility]     Precancerous skin lesion     not active; pt sees skin specialist    Sleep apnea          SURGICAL HISTORY     Past Surgical History:   Procedure Laterality Date    ANKLE CLOSED REDUCTION      CARDIAC SURGERY      single vessel CABG in 1999    EYE SURGERY      cataracts         CURRENTMEDICATIONS       Previous Medications    ALPROSTADIL (EDEX) 40 MCG INJECTION    40 mcg by Intracavitary route as needed for Erectile Dysfunction use no more than 3 times per week    AMLODIPINE (NORVASC) 10 MG TABLET    Take 1 tablet by mouth daily    ASPIRIN 81 MG TABLET    Take 81 mg by mouth daily    CARVEDILOL (COREG) 12.5 MG TABLET    Take 1 tablet by mouth 2 times daily (with meals)    DICYCLOMINE (BENTYL) 20 MG TABLET    Take 20 mg by mouth every 6 hours    INSULIN PUMP ACCESSORIES MISC    by Does not apply route Basal  7454-7780= 1.9 units/hr  3708-5370=2.05 units/hr  8241-1811=1.4 units/hr  2535-2651=1.8 units/hr     Carb ratio 5.5  Sens 22:1  Goal 9433-5934= 100-120           0131-9397= 120-140    METFORMIN (GLUCOPHAGE) 500 MG TABLET    Take 500 mg by mouth 2 times daily (with meals)    OMEPRAZOLE (PRILOSEC) 20 MG DELAYED RELEASE CAPSULE    Take 20 mg by mouth daily    PREGABALIN (LYRICA) 150 MG CAPSULE    Take 150 mg by mouth 2 times daily. Tayloryusef Estradaeman     SIMVASTATIN (ZOCOR) 80 MG TABLET    Take 80 mg by mouth nightly    VITAMIN D (CHOLECALCIFEROL) 1000 UNIT TABS TABLET    Take 1,000 Units by mouth daily         ALLERGIES     Gabapentin; Morphine; and Bupropion    FAMILYHISTORY       Family History   Adopted: Yes          SOCIAL HISTORY       Social History     Socioeconomic History    Marital status:      Spouse name: None    Number of children: None    Years of education: None    Highest education level: None   Occupational History    Occupation: retired     Comment: Heating/Cooling businessman   Social Needs    Financial resource strain: None    Food insecurity:     Worry: None     Inability: None    Transportation needs:     Medical: None     Non-medical: None   Tobacco Use    Smoking status: Former Smoker     Types: Cigarettes     Last attempt to quit: 2007     Years since quittin.2    Smokeless tobacco: Never Used    Tobacco comment: 3.5 PPD 14 years ago   Substance and Sexual Activity    Alcohol use: No    Drug use: No    Sexual activity: Not Currently   Lifestyle    Physical activity:     Days per week: None     Minutes per session: None    Stress: None   Relationships    Social connections:     Talks on phone: None     Gets together: None     Attends Mosque service: None     Active member of club or organization: None     Attends meetings of clubs or organizations: None     Relationship status: None    Intimate partner violence:     Fear of current or ex partner: None Emotionally abused: None     Physically abused: None     Forced sexual activity: None   Other Topics Concern    None   Social History Narrative    None       SCREENINGS             PHYSICAL EXAM    (up to 7 for level 4, 8 or more for level 5)     ED Triage Vitals [04/20/19 2252]   BP Temp Temp Source Pulse Resp SpO2 Height Weight   (!) 160/76 100.8 °F (38.2 °C) Temporal 91 24 (!) 68 % 5' 9\" (1.753 m) 165 lb (74.8 kg)       Physical Exam   Constitutional: He is oriented to person, place, and time. He appears well-developed and well-nourished. HENT:   Head: Normocephalic and atraumatic. Right Ear: External ear normal.   Left Ear: External ear normal.   Nose: Nose normal.   Eyes: Right eye exhibits no discharge. Left eye exhibits no discharge. Neck: Normal range of motion. Neck supple. Cardiovascular: Normal rate and regular rhythm. No murmur heard. Pulmonary/Chest: No stridor. He is in respiratory distress. He has wheezes. He exhibits no tenderness. Abdominal: Soft. He exhibits no distension. There is no tenderness. Musculoskeletal: Normal range of motion. Neurological: He is alert and oriented to person, place, and time. Skin: Skin is warm and dry. He is not diaphoretic. Psychiatric: He has a normal mood and affect. His behavior is normal.   Nursing note and vitals reviewed.       DIAGNOSTIC RESULTS   LABS:    Labs Reviewed   CBC WITH AUTO DIFFERENTIAL - Abnormal; Notable for the following components:       Result Value    RBC 4.02 (*)     Hemoglobin 13.3 (*)     Hematocrit 39.6 (*)     RDW 15.5 (*)     Neutrophils # 8.6 (*)     All other components within normal limits    Narrative:     Performed at:  [facility] [address], [place], [facility] [address]   Phone [phone]   COMPREHENSIVE METABOLIC PANEL - Abnormal; Notable for the following components:    Sodium 134 (*)     CO2 19 (*)     Glucose 292 (*)     BUN 21 (*)     CREATININE 1.5 (*)     GFR Non- 46 (*)     GFR  56 (*)     Total Protein 8.9 (*)     Albumin/Globulin Ratio 0.6 (*)     All other components within normal limits    Narrative:     Performed at:  [facility] [address]   Phone [phone]   PROTIME-INR - Abnormal; Notable for the following components:    Protime 13.4 (*)     INR 1.18 (*)     All other components within normal limits    Narrative:     Performed at:  [facility] [address] [facility] [facility]   Phone 21  - Abnormal; Notable for the following components:    Pro-BNP 2,378 (*)     All other components within normal limits    Narrative:     Performed at:  [facility] [address] [facility] [address]   Phone [phone]   POCT GLUCOSE - Abnormal; Notable for the following components:    POC Glucose 263 (*)     All other components within normal limits    Narrative:     Performed at:  [facility] [address] [facility] [address] [employer] [place]   Phone [phone]   CULTURE BLOOD #1   CULTURE BLOOD #2   RAPID INFLUENZA A/B ANTIGENS   TROPONIN    Narrative:     Performed at:  [facility] [address] [facility]   Phone [phone]   APTT    Narrative:     Performed at:  [facility] [address] [facility]   Phone [phone]   LACTIC ACID, PLASMA    Narrative:     Performed at:  [facility] [address] [facility] [address]   Phone [phone]   URINE RT REFLEX TO CULTURE   BLOOD GAS, ARTERIAL   BLOOD GAS, VENOUS       All other labs were within normal range or not returned as of this dictation. EKG:  All EKG's are interpreted by the Emergency Department Physician who either signs orCo-signs this chart in the

## 2019-04-21 NOTE — PROGRESS NOTES
Pt glucose level is 312. Pt typed in 312 in his insulin pump and administered 5.7 units bolus correction dose. At 0335. Will recheck blood sugar in 2 hours.

## 2019-04-21 NOTE — PROGRESS NOTES
Pt arrived on floor. Pt spO2 88 on 10 L high flow NC.   Increased os to 12 L high flow, pt sp02 now 93%

## 2019-04-21 NOTE — H&P
[address]                     [facility], [address]                              HISTORY AND PHYSICAL    PATIENT NAME: Fang Mandujano                     :        1945  MED REC NO:   [id number]                          ROOM:       [id number]  ACCOUNT NO:   [de-identified]                           ADMIT DATE: 2019  PROVIDER:     Shelby Nugent MD    I obtained the history and performed the physical exam on the patient in  the emergency room on 2019. CHIEF COMPLAINT:  Shortness of breath. HISTORY OF PRESENT ILLNESS:  The patient is a 72-year-old  male  who presented to the hospital with chief complaints of a few months of  subacute of gradually progressive increasing exertional shortness of  breath which got significantly worse in the past one day, rather  suddenly to a point where he was not able to catch his breath or  speaking full sentences, for which he finally had to be rushed to the  emergency room. The patient notes that he just finished a 10-day course  of antibiotics for a pneumonia from the  [facility]. At the time of my exam, the patient notes that he is feeling better  after he got treated and placed on BiPAP. PAST MEDICAL/PAST SURGICAL HISTORY:  1. Coronary artery disease, status post CABG x2.  2.  COPD. 3.  Type 2 diabetes mellitus on insulin pump. 4.  Dyslipidemia. 5.  Hypertension. PAST SURGICAL HISTORY:  Coronary artery bypass grafting. ALLERGIC HISTORY:  MORPHINE, BUPROPION, GABAPENTIN. FAMILY HISTORY:  Reviewed by me and is currently noncontributory. SOCIAL HISTORY:  Lives at home. Stopped smoking in . No illicit  substance use. MEDICATIONS:  1. Coreg. 2.  Amlodipine. 3.  Prilosec. 4.  Aspirin. 5.  Vitamin D.  6.  Bentyl. 7.  Lyrica. 8.  Metformin. 9.  Alprostadil.     REVIEW OF SYSTEMS:  The patient's review of systems is significant for  the shortness of breath and per the history Admitted to the Internal Medicine Service.         Christy Blake MD    D: 04/21/2019 5:20:04       T: 04/21/2019 6:24:21     [email or web address]  Job#: [id number]     Doc#: [id number]    CC:  <>

## 2019-04-21 NOTE — PROGRESS NOTES
[facility] PROGRESS NOTE    4/21/2019 12:13 PM        Name: Meseret Reynoso . Admitted: 4/20/2019  Primary Care Provider: Harry Moon [id number] (Tel: None)                        The patient came to hospital  Multifocal pneumonia with sepsis. Hx of CAD, CKD stage III, HTN. Introduce myself to patient.,  His bilateral crackles however look stable for now, H&P reviewed.   We'll continue to monitor        Debbi Holter, MD   4/21/2019 12:13 PM

## 2019-04-21 NOTE — PROGRESS NOTES
H/p dictation id [id number]. Date of service 04/21/19. Multifocal pneumonia with sepsis. Cad.  ckd III. Htn. Dyslipidemia.

## 2019-04-22 LAB
GLUCOSE BLD-MCNC: 145 MG/DL (ref 70–99)
GLUCOSE BLD-MCNC: 157 MG/DL (ref 70–99)
GLUCOSE BLD-MCNC: 184 MG/DL (ref 70–99)
GLUCOSE BLD-MCNC: 243 MG/DL (ref 70–99)
GLUCOSE BLD-MCNC: 80 MG/DL (ref 70–99)
PERFORMED ON: ABNORMAL
PERFORMED ON: NORMAL
RAPID INFLUENZA  B AGN: NEGATIVE
RAPID INFLUENZA A AGN: NEGATIVE

## 2019-04-22 PROCEDURE — 6360000002 HC RX W HCPCS: Performed by: INTERNAL MEDICINE

## 2019-04-22 PROCEDURE — 2580000003 HC RX 258: Performed by: INTERNAL MEDICINE

## 2019-04-22 PROCEDURE — 2060000000 HC ICU INTERMEDIATE R&B

## 2019-04-22 PROCEDURE — 94668 MNPJ CHEST WALL SBSQ: CPT

## 2019-04-22 PROCEDURE — 2700000000 HC OXYGEN THERAPY PER DAY

## 2019-04-22 PROCEDURE — 6370000000 HC RX 637 (ALT 250 FOR IP): Performed by: INTERNAL MEDICINE

## 2019-04-22 PROCEDURE — 94640 AIRWAY INHALATION TREATMENT: CPT

## 2019-04-22 PROCEDURE — 94760 N-INVAS EAR/PLS OXIMETRY 1: CPT

## 2019-04-22 PROCEDURE — 87804 INFLUENZA ASSAY W/OPTIC: CPT

## 2019-04-22 RX ADMIN — Medication 10 ML: at 22:15

## 2019-04-22 RX ADMIN — CARVEDILOL 12.5 MG: 6.25 TABLET, FILM COATED ORAL at 17:51

## 2019-04-22 RX ADMIN — AMLODIPINE BESYLATE 10 MG: 5 TABLET ORAL at 09:37

## 2019-04-22 RX ADMIN — ASPIRIN 81 MG: 81 TABLET, COATED ORAL at 09:37

## 2019-04-22 RX ADMIN — IPRATROPIUM BROMIDE AND ALBUTEROL SULFATE 1 AMPULE: .5; 3 SOLUTION RESPIRATORY (INHALATION) at 11:15

## 2019-04-22 RX ADMIN — AZITHROMYCIN 250 MG: 250 TABLET, FILM COATED ORAL at 09:37

## 2019-04-22 RX ADMIN — IPRATROPIUM BROMIDE AND ALBUTEROL SULFATE 1 AMPULE: .5; 3 SOLUTION RESPIRATORY (INHALATION) at 16:07

## 2019-04-22 RX ADMIN — Medication 10 ML: at 01:34

## 2019-04-22 RX ADMIN — CARVEDILOL 12.5 MG: 6.25 TABLET, FILM COATED ORAL at 09:37

## 2019-04-22 RX ADMIN — ENOXAPARIN SODIUM 40 MG: 40 INJECTION SUBCUTANEOUS at 09:38

## 2019-04-22 RX ADMIN — IPRATROPIUM BROMIDE AND ALBUTEROL SULFATE 1 AMPULE: .5; 3 SOLUTION RESPIRATORY (INHALATION) at 08:51

## 2019-04-22 RX ADMIN — Medication 10 ML: at 09:39

## 2019-04-22 RX ADMIN — PANTOPRAZOLE SODIUM 40 MG: 40 TABLET, DELAYED RELEASE ORAL at 06:41

## 2019-04-22 RX ADMIN — Medication 1 G: at 01:34

## 2019-04-22 ASSESSMENT — PAIN SCALES - GENERAL
PAINLEVEL_OUTOF10: 4
PAINLEVEL_OUTOF10: 0
PAINLEVEL_OUTOF10: 0

## 2019-04-22 ASSESSMENT — PAIN DESCRIPTION - PAIN TYPE: TYPE: CHRONIC PAIN

## 2019-04-22 ASSESSMENT — PAIN DESCRIPTION - LOCATION: LOCATION: BACK

## 2019-04-22 NOTE — PLAN OF CARE
Problem: Falls - Risk of:  Goal: Will remain free from falls  Description  Will remain free from falls  4/21/2019 2116 by Gabriel Mckeon RN  Note:   MEDIUM fall risk. Pt is aware of needs, INDEPENDENT with ambulation, rings appropriately for assistance when needed. Armband in place, nonskid socks on during ambulation, callbell in reach.

## 2019-04-22 NOTE — CARE COORDINATION
Discharge Planning Assessment  RN/SW discharge planner met with patient/(and family member) to discuss reason for admission, current living situation, and potential needs at the time of discharge     Demographics/Insurance verified Yes/No     Current type of dwelling[de-identified] Friend(s)  Type of Home: [address]: One level, Laundry in basement  Home Access: Stairs to enter with rails  Entrance Stairs - Number of Steps: 2 steps to enter, 8 steps to go down to laundry   Entrance Stairs - Rails: Both  Bathroom Shower/Tub: Tub/Shower unit  Bathroom Toilet: Standard  Bathroom Equipment: Shower chair  ADL Assistance: [address]: Independent  Homemaking Responsibilities: Yes  Other (Comment): Patient likes to eat out every night. Ambulation Assistance: Independent  PCP: [facility]  DME: no  Active with any community resources/agencies/skilled home care:     Transportation at the time of discharge: TBD     Tentative discharge plan: Return home with services through the [place]. Notified [facility], [phone], at [facility]- transfer center, He is non service connected, A& B. His [facility] PCP is Dr Brijesh Aguillon, [phone], and the RN is Jun Pérez ext. [phone], or Mc ext. [phone].

## 2019-04-22 NOTE — PROGRESS NOTES
Assessment complete, vitals stable. Pt refused HS meds, states he no longer takes gabapentin and his cholesterol med is the wrong one. Pt denies any needs a this time, callbell in reach.   Chandan Parr RN

## 2019-04-22 NOTE — PROGRESS NOTES
Hospitalist Progress Note      PCP: Harry Moon [id number]    Date of Admission: 4/20/2019    Chief Complaint:  SOB    Hospital Course: The patient is a 72-year-old  male who presented to the hospital with chief complaints of a few months of  subacute of gradually progressive increasing exertional shortness of breath which got significantly worse in the past one day, rather suddenly to a point where he was not able to catch his breath or speaking full sentences, for which he finally had to be rushed to the emergency room. The patient notes that he just finished a 10-day course of antibiotics for a pneumonia from the [place]. It was Doxycycline           Subjective: doing well but still needing 8 liters of oxygen      Medications:  Reviewed    Infusion Medications    Insulin Pump - insulin aspart 1.9 Units/hr (04/22/19 1321)     Scheduled Medications    amLODIPine  10 mg Oral Daily    aspirin  81 mg Oral Daily    carvedilol  12.5 mg Oral BID WC    pantoprazole  40 mg Oral QAM AC    pregabalin  150 mg Oral BID    sodium chloride flush  10 mL Intravenous 2 times per day    enoxaparin  40 mg Subcutaneous Daily    ipratropium-albuterol  1 ampule Inhalation Q4H WA    cefTRIAXone (ROCEPHIN) IV  1 g Intravenous Q24H    azithromycin  250 mg Oral Daily    rosuvastatin  20 mg Oral Nightly     PRN Meds: sodium chloride flush, ondansetron      Intake/Output Summary (Last 24 hours) at 4/22/2019 1605  Last data filed at 4/22/2019 1300  Gross per 24 hour   Intake 610 ml   Output 1350 ml   Net -740 ml       Physical Exam Performed:    /62   Pulse 66   Temp 97.9 °F (36.6 °C) (Temporal)   Resp 18   Ht 5' 9\" (1.753 m)   Wt 166 lb (75.3 kg)   SpO2 95%   BMI 24.51 kg/m²     General appearance: No apparent distress, appears stated age and cooperative. HEENT: Pupils equal, round, and reactive to light. Conjunctivae/corneas clear. Neck: Supple, with full range of motion. No jugular venous distention. Stage 3 chronic kidney disease.  - monitor renal function in house    5. Diabetes mellitus type 2 on insulin pump. - monitor sugars    6. Dyslipidemia. -statin       7.  Hypertension  - resume home meds        DVT Prophylaxis: heparin  Diet: DIET RENAL; Carb Control: 4 carb choices (60 gms)/meal  Code Status: Full Code    PT/OT Eval Status: eval and treat     Viktoriya Abdullahi MD

## 2019-04-22 NOTE — PROGRESS NOTES
Assessment complete, vitals stable. HS meds given. Pt denies any other needs, callbell in reach.   Sean Espinal RN

## 2019-04-22 NOTE — PLAN OF CARE
Problem: Falls - Risk of:  Goal: Will remain free from falls  Description  Will remain free from falls  4/22/2019 0642 by Tisha Rubio RN  Outcome: Met This Shift     Problem: Falls - Risk of:  Goal: Absence of physical injury  Description  Absence of physical injury  4/22/2019 0642 by Tisha Rubio RN  Outcome: Met This Shift

## 2019-04-22 NOTE — PLAN OF CARE
Problem: Serum Glucose Level - Abnormal:  Intervention: Glucose management  Note:   Blood glucose 400 - pt manages insulin pump - gave himself  11.7 units

## 2019-04-23 LAB
GLUCOSE BLD-MCNC: 169 MG/DL (ref 70–99)
GLUCOSE BLD-MCNC: 170 MG/DL (ref 70–99)
GLUCOSE BLD-MCNC: 73 MG/DL (ref 70–99)
GLUCOSE BLD-MCNC: 93 MG/DL (ref 70–99)
PERFORMED ON: ABNORMAL
PERFORMED ON: ABNORMAL
PERFORMED ON: NORMAL
PERFORMED ON: NORMAL

## 2019-04-23 PROCEDURE — 2060000000 HC ICU INTERMEDIATE R&B

## 2019-04-23 PROCEDURE — 94760 N-INVAS EAR/PLS OXIMETRY 1: CPT

## 2019-04-23 PROCEDURE — 99222 1ST HOSP IP/OBS MODERATE 55: CPT | Performed by: INTERNAL MEDICINE

## 2019-04-23 PROCEDURE — 6370000000 HC RX 637 (ALT 250 FOR IP): Performed by: INTERNAL MEDICINE

## 2019-04-23 PROCEDURE — 6360000002 HC RX W HCPCS: Performed by: INTERNAL MEDICINE

## 2019-04-23 PROCEDURE — 2580000003 HC RX 258: Performed by: INTERNAL MEDICINE

## 2019-04-23 PROCEDURE — 2700000000 HC OXYGEN THERAPY PER DAY

## 2019-04-23 PROCEDURE — 94640 AIRWAY INHALATION TREATMENT: CPT

## 2019-04-23 PROCEDURE — 94668 MNPJ CHEST WALL SBSQ: CPT

## 2019-04-23 RX ORDER — NICOTINE POLACRILEX 4 MG
15 LOZENGE BUCCAL PRN
Status: DISCONTINUED | OUTPATIENT
Start: 2019-04-23 | End: 2019-04-26 | Stop reason: HOSPADM

## 2019-04-23 RX ORDER — AZELASTINE HYDROCHLORIDE 137 UG/1
1 SPRAY, METERED NASAL 2 TIMES DAILY
Status: DISCONTINUED | OUTPATIENT
Start: 2019-04-23 | End: 2019-04-23 | Stop reason: CLARIF

## 2019-04-23 RX ORDER — DEXTROSE MONOHYDRATE 50 MG/ML
100 INJECTION, SOLUTION INTRAVENOUS PRN
Status: DISCONTINUED | OUTPATIENT
Start: 2019-04-23 | End: 2019-04-26 | Stop reason: HOSPADM

## 2019-04-23 RX ORDER — DEXTROSE MONOHYDRATE 25 G/50ML
12.5 INJECTION, SOLUTION INTRAVENOUS PRN
Status: DISCONTINUED | OUTPATIENT
Start: 2019-04-23 | End: 2019-04-26 | Stop reason: HOSPADM

## 2019-04-23 RX ORDER — AZELASTINE 1 MG/ML
1 SPRAY, METERED NASAL 2 TIMES DAILY
Status: DISCONTINUED | OUTPATIENT
Start: 2019-04-23 | End: 2019-04-26 | Stop reason: HOSPADM

## 2019-04-23 RX ADMIN — ROSUVASTATIN CALCIUM 20 MG: 20 TABLET, FILM COATED ORAL at 20:25

## 2019-04-23 RX ADMIN — CARVEDILOL 12.5 MG: 6.25 TABLET, FILM COATED ORAL at 10:29

## 2019-04-23 RX ADMIN — ENOXAPARIN SODIUM 40 MG: 40 INJECTION SUBCUTANEOUS at 10:29

## 2019-04-23 RX ADMIN — AZITHROMYCIN 250 MG: 250 TABLET, FILM COATED ORAL at 10:29

## 2019-04-23 RX ADMIN — Medication 1 G: at 00:16

## 2019-04-23 RX ADMIN — Medication 10 ML: at 10:30

## 2019-04-23 RX ADMIN — ASPIRIN 81 MG: 81 TABLET, COATED ORAL at 10:29

## 2019-04-23 RX ADMIN — AMLODIPINE BESYLATE 10 MG: 5 TABLET ORAL at 10:29

## 2019-04-23 RX ADMIN — IPRATROPIUM BROMIDE AND ALBUTEROL SULFATE 1 AMPULE: .5; 3 SOLUTION RESPIRATORY (INHALATION) at 15:32

## 2019-04-23 RX ADMIN — IPRATROPIUM BROMIDE AND ALBUTEROL SULFATE 1 AMPULE: .5; 3 SOLUTION RESPIRATORY (INHALATION) at 08:02

## 2019-04-23 RX ADMIN — Medication 10 ML: at 20:27

## 2019-04-23 RX ADMIN — IPRATROPIUM BROMIDE AND ALBUTEROL SULFATE 1 AMPULE: .5; 3 SOLUTION RESPIRATORY (INHALATION) at 21:11

## 2019-04-23 RX ADMIN — CARVEDILOL 12.5 MG: 6.25 TABLET, FILM COATED ORAL at 18:20

## 2019-04-23 RX ADMIN — PANTOPRAZOLE SODIUM 40 MG: 40 TABLET, DELAYED RELEASE ORAL at 06:27

## 2019-04-23 RX ADMIN — IPRATROPIUM BROMIDE AND ALBUTEROL SULFATE 1 AMPULE: .5; 3 SOLUTION RESPIRATORY (INHALATION) at 11:39

## 2019-04-23 ASSESSMENT — PAIN DESCRIPTION - PAIN TYPE: TYPE: CHRONIC PAIN

## 2019-04-23 ASSESSMENT — PAIN SCALES - GENERAL: PAINLEVEL_OUTOF10: 4

## 2019-04-23 ASSESSMENT — PAIN DESCRIPTION - LOCATION: LOCATION: BACK

## 2019-04-23 NOTE — PROGRESS NOTES
Home med list was updated last night -  Sticky note for MD put on chart and passed on to dayshift nurse, no new orders yet. Pt is requesting home HS med and nasal spray. Msg sent via [employer].   Nayana Marin RN

## 2019-04-23 NOTE — PROGRESS NOTES
Hospitalist Progress Note      PCP: Harry Moon [id number]    Date of Admission: 4/20/2019    Chief Complaint:  SOB    Hospital Course: The patient is a 72-year-old  male who presented to the hospital with chief complaints of a few months of  subacute of gradually progressive increasing exertional shortness of breath which got significantly worse in the past one day, rather suddenly to a point where he was not able to catch his breath or speaking full sentences, for which he finally had to be rushed to the emergency room. The patient notes that he just finished a 10-day course of antibiotics for a pneumonia from the [place]. It was Doxycycline           Subjective:   Down to 6 liters today and feeling a bit better. Medications:  Reviewed    Infusion Medications    dextrose      Insulin Pump - insulin aspart 1.9 Units/hr (04/23/19 1331)     Scheduled Medications    azelastine  1 spray Each Nare BID    amLODIPine  10 mg Oral Daily    aspirin  81 mg Oral Daily    carvedilol  12.5 mg Oral BID WC    pantoprazole  40 mg Oral QAM AC    pregabalin  150 mg Oral BID    sodium chloride flush  10 mL Intravenous 2 times per day    enoxaparin  40 mg Subcutaneous Daily    ipratropium-albuterol  1 ampule Inhalation Q4H WA    cefTRIAXone (ROCEPHIN) IV  1 g Intravenous Q24H    azithromycin  250 mg Oral Daily    rosuvastatin  20 mg Oral Nightly     PRN Meds: glucose, dextrose, glucagon (rDNA), dextrose, sodium chloride flush, ondansetron      Intake/Output Summary (Last 24 hours) at 4/23/2019 1748  Last data filed at 4/22/2019 2216  Gross per 24 hour   Intake 130 ml   Output --   Net 130 ml       Physical Exam Performed:    BP (!) 143/71   Pulse 65   Temp 98 °F (36.7 °C) (Temporal)   Resp 16   Ht 5' 9\" (1.753 m)   Wt 166 lb (75.3 kg)   SpO2 92%   BMI 24.51 kg/m²     General appearance: No apparent distress, appears stated age and cooperative. HEENT: Pupils equal, round, and reactive to light. Conjunctivae/corneas clear. Neck: Supple, with full range of motion. No jugular venous distention. Trachea midline. Respiratory:  Normal respiratory effort. Clear to auscultation, bilaterally without Rales/Wheezes/Rhonchi. Cardiovascular: Regular rate and rhythm with normal S1/S2 without murmurs, rubs or gallops. Abdomen: Soft, non-tender, non-distended with normal bowel sounds. Musculoskeletal: No clubbing, cyanosis or edema bilaterally. Full range of motion without deformity. Skin: Skin color, texture, turgor normal.  No rashes or lesions. Neurologic:  Neurovascularly intact without any focal sensory/motor deficits. Cranial nerves: II-XII intact, grossly non-focal.  Psychiatric: Alert and oriented, thought content appropriate, normal insight  Capillary Refill: Brisk,< 3 seconds   Peripheral Pulses: +2 palpable, equal bilaterally       Labs:   Recent Labs     04/20/19 2310   WBC 10.5   HGB 13.3*   HCT 39.6*        Recent Labs     04/20/19 2310   *   K 4.2      CO2 19*   BUN 21*   CREATININE 1.5*   CALCIUM 9.0     Recent Labs     04/20/19 2310   AST 34   ALT 29   BILITOT 0.9   ALKPHOS 94     Recent Labs     04/20/19 2310   INR 1.18*     Recent Labs     04/20/19 2310   TROPONINI <0.01       Urinalysis:      Lab Results   Component Value Date    NITRU Negative 04/21/2019    WBCUA 1 04/21/2019    RBCUA 10 04/21/2019    BLOODU MODERATE 04/21/2019    SPECGRAV 1.021 04/21/2019    GLUCOSEU 500 04/21/2019       Radiology:  XR CHEST PORTABLE   Final Result   Generalized interstitial prominence and multifocal airspace opacities,   suggestive of vascular congestion or possibly multifocal   infectious/inflammatory process. Assessment/Plan:    Active Hospital Problems    Diagnosis Date Noted    Multifocal pneumonia [J18.9] 04/20/2019     1 Acute multifocal community acquired bacterial pneumonia with early  Sepsis. - on appropriate abx  -    2.  Acute respiratory failure with hypoxia  - secondary to above. - continue oxygen  - will consult pulm re additional medications. 3. Coronary artery disease.   - on home meds      4. Stage 3 chronic kidney disease.  - monitor renal function in house    5. Diabetes mellitus type 2 on insulin pump. - monitor sugars    6. Dyslipidemia. -statin       7.  Hypertension  - resume home meds        DVT Prophylaxis: heparin  Diet: DIET CARB CONTROL; Carb Control: 4 carb choices (60 gms)/meal  Code Status: Full Code    PT/OT Eval Status: eval and treat     Major MD Kita

## 2019-04-23 NOTE — PROGRESS NOTES
Pt resting in bed with no symptoms/signs of pain or distress at this time. Pt has no concerns or complaints. Tele in place with VSS, elevated BP. No change from previous assessment. Will continue to monitor.

## 2019-04-24 LAB
ANION GAP SERPL CALCULATED.3IONS-SCNC: 10 MMOL/L (ref 3–16)
BUN BLDV-MCNC: 26 MG/DL (ref 7–20)
CALCIUM SERPL-MCNC: 8.4 MG/DL (ref 8.3–10.6)
CHLORIDE BLD-SCNC: 109 MMOL/L (ref 99–110)
CO2: 22 MMOL/L (ref 21–32)
CREAT SERPL-MCNC: 1.2 MG/DL (ref 0.8–1.3)
GFR AFRICAN AMERICAN: >60
GFR NON-AFRICAN AMERICAN: 60
GLUCOSE BLD-MCNC: 118 MG/DL (ref 70–99)
GLUCOSE BLD-MCNC: 163 MG/DL (ref 70–99)
GLUCOSE BLD-MCNC: 212 MG/DL (ref 70–99)
GLUCOSE BLD-MCNC: 219 MG/DL (ref 70–99)
GLUCOSE BLD-MCNC: 57 MG/DL (ref 70–99)
GLUCOSE BLD-MCNC: 84 MG/DL (ref 70–99)
GLUCOSE BLD-MCNC: 91 MG/DL (ref 70–99)
HCT VFR BLD CALC: 34.5 % (ref 40.5–52.5)
HEMOGLOBIN: 11.5 G/DL (ref 13.5–17.5)
MCH RBC QN AUTO: 32.6 PG (ref 26–34)
MCHC RBC AUTO-ENTMCNC: 33.4 G/DL (ref 31–36)
MCV RBC AUTO: 97.5 FL (ref 80–100)
PDW BLD-RTO: 14.6 % (ref 12.4–15.4)
PERFORMED ON: ABNORMAL
PERFORMED ON: NORMAL
PLATELET # BLD: 117 K/UL (ref 135–450)
PMV BLD AUTO: 9.1 FL (ref 5–10.5)
POTASSIUM SERPL-SCNC: 3.4 MMOL/L (ref 3.5–5.1)
RBC # BLD: 3.54 M/UL (ref 4.2–5.9)
SODIUM BLD-SCNC: 141 MMOL/L (ref 136–145)
WBC # BLD: 7 K/UL (ref 4–11)

## 2019-04-24 PROCEDURE — 2580000003 HC RX 258: Performed by: INTERNAL MEDICINE

## 2019-04-24 PROCEDURE — 94640 AIRWAY INHALATION TREATMENT: CPT

## 2019-04-24 PROCEDURE — 94680 O2 UPTK RST&XERS DIR SIMPLE: CPT

## 2019-04-24 PROCEDURE — 6370000000 HC RX 637 (ALT 250 FOR IP): Performed by: INTERNAL MEDICINE

## 2019-04-24 PROCEDURE — 80048 BASIC METABOLIC PNL TOTAL CA: CPT

## 2019-04-24 PROCEDURE — 94668 MNPJ CHEST WALL SBSQ: CPT

## 2019-04-24 PROCEDURE — 94760 N-INVAS EAR/PLS OXIMETRY 1: CPT

## 2019-04-24 PROCEDURE — 2700000000 HC OXYGEN THERAPY PER DAY

## 2019-04-24 PROCEDURE — 85027 COMPLETE CBC AUTOMATED: CPT

## 2019-04-24 PROCEDURE — 99233 SBSQ HOSP IP/OBS HIGH 50: CPT | Performed by: INTERNAL MEDICINE

## 2019-04-24 PROCEDURE — 6370000000 HC RX 637 (ALT 250 FOR IP): Performed by: HOSPITALIST

## 2019-04-24 PROCEDURE — 36415 COLL VENOUS BLD VENIPUNCTURE: CPT

## 2019-04-24 PROCEDURE — 2060000000 HC ICU INTERMEDIATE R&B

## 2019-04-24 PROCEDURE — 6360000002 HC RX W HCPCS: Performed by: INTERNAL MEDICINE

## 2019-04-24 RX ORDER — PREDNISONE 20 MG/1
40 TABLET ORAL DAILY
Qty: 20 TABLET | Refills: 0 | Status: SHIPPED | OUTPATIENT
Start: 2019-04-24 | End: 2019-04-29

## 2019-04-24 RX ORDER — BUDESONIDE AND FORMOTEROL FUMARATE DIHYDRATE 160; 4.5 UG/1; UG/1
2 AEROSOL RESPIRATORY (INHALATION) 2 TIMES DAILY
Qty: 1 INHALER | Refills: 3 | Status: SHIPPED | OUTPATIENT
Start: 2019-04-24

## 2019-04-24 RX ORDER — POTASSIUM CHLORIDE 20 MEQ/1
40 TABLET, EXTENDED RELEASE ORAL PRN
Status: DISCONTINUED | OUTPATIENT
Start: 2019-04-24 | End: 2019-04-26 | Stop reason: HOSPADM

## 2019-04-24 RX ORDER — POTASSIUM CHLORIDE 7.45 MG/ML
10 INJECTION INTRAVENOUS PRN
Status: DISCONTINUED | OUTPATIENT
Start: 2019-04-24 | End: 2019-04-26 | Stop reason: HOSPADM

## 2019-04-24 RX ADMIN — CARVEDILOL 12.5 MG: 6.25 TABLET, FILM COATED ORAL at 09:08

## 2019-04-24 RX ADMIN — Medication 10 ML: at 09:09

## 2019-04-24 RX ADMIN — PANTOPRAZOLE SODIUM 40 MG: 40 TABLET, DELAYED RELEASE ORAL at 05:14

## 2019-04-24 RX ADMIN — CARVEDILOL 12.5 MG: 6.25 TABLET, FILM COATED ORAL at 17:18

## 2019-04-24 RX ADMIN — Medication 10 ML: at 21:06

## 2019-04-24 RX ADMIN — IPRATROPIUM BROMIDE AND ALBUTEROL SULFATE 1 AMPULE: .5; 3 SOLUTION RESPIRATORY (INHALATION) at 20:43

## 2019-04-24 RX ADMIN — ROSUVASTATIN CALCIUM 20 MG: 20 TABLET, FILM COATED ORAL at 21:06

## 2019-04-24 RX ADMIN — ENOXAPARIN SODIUM 40 MG: 40 INJECTION SUBCUTANEOUS at 09:08

## 2019-04-24 RX ADMIN — POTASSIUM CHLORIDE 40 MEQ: 1500 TABLET, EXTENDED RELEASE ORAL at 05:19

## 2019-04-24 RX ADMIN — IPRATROPIUM BROMIDE AND ALBUTEROL SULFATE 1 AMPULE: .5; 3 SOLUTION RESPIRATORY (INHALATION) at 16:02

## 2019-04-24 RX ADMIN — AMLODIPINE BESYLATE 10 MG: 5 TABLET ORAL at 09:07

## 2019-04-24 RX ADMIN — ASPIRIN 81 MG: 81 TABLET, COATED ORAL at 09:07

## 2019-04-24 RX ADMIN — IPRATROPIUM BROMIDE AND ALBUTEROL SULFATE 1 AMPULE: .5; 3 SOLUTION RESPIRATORY (INHALATION) at 08:44

## 2019-04-24 RX ADMIN — AZITHROMYCIN 250 MG: 250 TABLET, FILM COATED ORAL at 09:08

## 2019-04-24 RX ADMIN — Medication 1 G: at 00:11

## 2019-04-24 RX ADMIN — IPRATROPIUM BROMIDE AND ALBUTEROL SULFATE 1 AMPULE: .5; 3 SOLUTION RESPIRATORY (INHALATION) at 13:00

## 2019-04-24 ASSESSMENT — PAIN DESCRIPTION - PAIN TYPE
TYPE: CHRONIC PAIN

## 2019-04-24 ASSESSMENT — PAIN SCALES - GENERAL
PAINLEVEL_OUTOF10: 4

## 2019-04-24 ASSESSMENT — PAIN DESCRIPTION - LOCATION
LOCATION: BACK
LOCATION: BACK;NECK
LOCATION: BACK

## 2019-04-24 NOTE — CARE COORDINATION
Ramesh Coughlin at [place] to refer for home oxygen. Referral made for home oxygen upon discharge. Will need an order and qualifying SATs before discharge. SATs must be day before Discharge or day of Discharge. The following is how the SATs should read:     RA at rest_____%   RA with Ambulation ____%   Ambulation with _____LPM of O2 _____%. Navdeep Cfofey is concerned that [place] is primary on facesheet but Yordan Ruvalcaba [facility] and is not [facility]. [facility] and left a voicemail to clarify.  Girish Richard, MSW, LSW

## 2019-04-24 NOTE — PLAN OF CARE
Problem: Falls - Risk of:  Goal: Will remain free from falls  Description  Will remain free from falls  Outcome: Ongoing  Note:   Pt remains free from falls and accidental injury at this time. Bed locked and in lowest position. Floor free from obstacles and pt encouraged to call before getting OOB and as needed. Using call light appropriately. Will continue to monitor additional needs. Problem: Pain:  Goal: Control of acute pain  Description  Control of acute pain  Outcome: Ongoing  Note:   Pt denies having any acute pain but pt states he has chronic back pain. Pt satisfied a this time. Will continue to monitor. Problem: Serum Glucose Level - Abnormal:  Intervention: Monitor blood glucose measurement  Note:   BS checked at HS and at 2am. Pt using insulin pump appropriately.

## 2019-04-24 NOTE — PROGRESS NOTES
Home Oxygen Evaluation     Patients room air at rest saturation SpO2 88%       Patients room air saturation SpO2 87% with exertion       Patients SpO2 on exertion with oxygen   1 lpm = SpO2  89%    2 lpm = SpO2  91%

## 2019-04-24 NOTE — CARE COORDINATION
[facility] and pt is not service connected but pt's PCP's ofc to call this worker today regarding their ability to order DME or not. Await call.  Nataliya Dunaway, MSW, LSW

## 2019-04-24 NOTE — PROGRESS NOTES
[facility] Nurse  Consult Note       NAME:  Shay Dye  MEDICAL RECORD NUMBER:  [id number]  AGE: 69 y.o. GENDER: male  : 1945  TODAY'S DATE:  2019    Subjective:     Reason for Educator consult ---  Evaluation and Assessment:    Shay Dye is a 69 y.o. male referred by:   [] Physician  [] Nursing  [x] Other:      Patient  Seen secondary to using insulin pump at home. Patient states diagnosed with T-1 diabetes 30+ years ago. He has been on a pump almost as long and is known to me from previous admissions. The patient does have a glucometer at home and states testing 4-5 times a day. Pt is aware of S/S of hyper- and hypoglycemia and the proper treatment of hyper- and hypoglycemia. He did have hypoglycemia this morning. Explained A1C values and goals. Patient states counting carbohydrates and entering those values into pump. Currently the patient has a Medtronic MiniMed/ Omnipod/ T-slim/ V-go pump with Humalog/ Novolog at the following settings:   BASAL- MN- 1  CARB RATIO-8   SENSITIVITY- 30  TARGET- 120-140    Patient states current pump site was placed 1.5 days ago. Patient has extra pump supplies at bedside. Explained prefer to have extra pump supplies always available in case site gets pulled out or something happens to it where it needs to be changed. Patient's pump site was visualized and is currently without edema, redness, or leaking. Patient states feeling comfortable with maintaining pump while in the hospital. Denies further questions or concerns at this time.           PAST MEDICAL HISTORY      Diagnosis Date    Arthritis     CAD (coronary artery disease)     [facility]     Chest pain     COPD (chronic obstructive pulmonary disease) ([facility]     Diabetes mellitus (HCC)     pump patient    Fibromyalgia     takes Lyrica for fibromyalgia and diabetic nerve pain    GERD (gastroesophageal reflux disease)     Heart attack ([facility] [address] [facility] 1991 heart attack ([place])

## 2019-04-24 NOTE — CARE COORDINATION
Spoke with Jono Driscoll at PCP ofc and pt must obtain home oxygen through [place] since he has exempt copays. Fax #: [phone] for clinical. RN aware.  Jacqueline Clemente, MSW, LSW

## 2019-04-24 NOTE — PROGRESS NOTES
[facility] Pulmonary and Critical Care    Follow Up Note    Subjective:   CHIEF COMPLAINT / HPI:   Chief Complaint   Patient presents with    Shortness of Breath     sob has been for months, but worse today. 68% RA in triage      The patient was originally admitted with dyspnea and hypoxemia. Evaluation revealed probable pneumonia. He is on antibiotics. He is also still requiring oxygen though his requirement is improving. He gives a history of COPD. However, he does not normally use oxygen at home. He feels like his breathing has improved in the hospital.    Past Medical History:    Reviewed; no changes    Social History:    Reviewed; no changes    REVIEW OF SYSTEMS:    CONSTITUTIONAL:  negative for fevers and chills  RESPIRATORY:  See HPI  CARDIOVASCULAR:  negative for chest pain, palpitations, edema  GASTROINTESTINAL:  negative for nausea, vomiting, diarrhea, constipation and abdominal pain    Objective:   PHYSICAL EXAM:        VITALS:  BP (!) 153/72   Pulse 62   Temp 98.4 °F (36.9 °C) (Temporal)   Resp 18   Ht 5' 9\" (1.753 m)   Wt 165 lb 3.2 oz (74.9 kg)   SpO2 93%   BMI 24.40 kg/m²  on 3.5 L NC    24HR INTAKE/OUTPUT:      Intake/Output Summary (Last 24 hours) at 4/24/2019 1036  Last data filed at 4/24/2019 8047  Gross per 24 hour   Intake 120 ml   Output --   Net 120 ml       CONSTITUTIONAL:  awake, alert,  no apparent distress, and appears stated age  LUNGS:  No increased work of breathing and clear to auscultation, no crackles or wheezes  CARDIOVASCULAR: S1 and S2 and no JVD  ABDOMEN:  normal bowel sounds, non-distended and non-tender to palpation  EXT: No edema, no calf tenderness. Pulses are present bilaterally. NEUROLOGIC:  Mental Status Exam:  Level of Alertness:   awake  Orientation:   person, place, time.  Non focal  SKIN:  normal skin color, texture, turgor, no redness, warmth, or swelling at IV sites    DATA:    CBC:  Recent Labs     04/24/19  0446   WBC 7.0   RBC 3.54*   HGB 11.5*   HCT 34.5*

## 2019-04-25 LAB
GLUCOSE BLD-MCNC: 127 MG/DL (ref 70–99)
GLUCOSE BLD-MCNC: 137 MG/DL (ref 70–99)
GLUCOSE BLD-MCNC: 139 MG/DL (ref 70–99)
GLUCOSE BLD-MCNC: 151 MG/DL (ref 70–99)
GLUCOSE BLD-MCNC: 177 MG/DL (ref 70–99)
GLUCOSE BLD-MCNC: 69 MG/DL (ref 70–99)
GLUCOSE BLD-MCNC: 88 MG/DL (ref 70–99)
GLUCOSE BLD-MCNC: 88 MG/DL (ref 70–99)
GLUCOSE BLD-MCNC: 92 MG/DL (ref 70–99)
PERFORMED ON: ABNORMAL
PERFORMED ON: NORMAL

## 2019-04-25 PROCEDURE — 6360000002 HC RX W HCPCS: Performed by: INTERNAL MEDICINE

## 2019-04-25 PROCEDURE — 94640 AIRWAY INHALATION TREATMENT: CPT

## 2019-04-25 PROCEDURE — 2700000000 HC OXYGEN THERAPY PER DAY

## 2019-04-25 PROCEDURE — 2060000000 HC ICU INTERMEDIATE R&B

## 2019-04-25 PROCEDURE — 94760 N-INVAS EAR/PLS OXIMETRY 1: CPT

## 2019-04-25 PROCEDURE — 94668 MNPJ CHEST WALL SBSQ: CPT

## 2019-04-25 PROCEDURE — 6370000000 HC RX 637 (ALT 250 FOR IP): Performed by: INTERNAL MEDICINE

## 2019-04-25 PROCEDURE — 2580000003 HC RX 258: Performed by: INTERNAL MEDICINE

## 2019-04-25 RX ADMIN — ASPIRIN 81 MG: 81 TABLET, COATED ORAL at 09:11

## 2019-04-25 RX ADMIN — ENOXAPARIN SODIUM 40 MG: 40 INJECTION SUBCUTANEOUS at 09:14

## 2019-04-25 RX ADMIN — CARVEDILOL 12.5 MG: 6.25 TABLET, FILM COATED ORAL at 16:30

## 2019-04-25 RX ADMIN — IPRATROPIUM BROMIDE AND ALBUTEROL SULFATE 1 AMPULE: .5; 3 SOLUTION RESPIRATORY (INHALATION) at 16:38

## 2019-04-25 RX ADMIN — PANTOPRAZOLE SODIUM 40 MG: 40 TABLET, DELAYED RELEASE ORAL at 05:07

## 2019-04-25 RX ADMIN — IPRATROPIUM BROMIDE AND ALBUTEROL SULFATE 1 AMPULE: .5; 3 SOLUTION RESPIRATORY (INHALATION) at 12:20

## 2019-04-25 RX ADMIN — Medication 1 G: at 00:16

## 2019-04-25 RX ADMIN — AZITHROMYCIN 250 MG: 250 TABLET, FILM COATED ORAL at 09:11

## 2019-04-25 RX ADMIN — CARVEDILOL 12.5 MG: 6.25 TABLET, FILM COATED ORAL at 09:11

## 2019-04-25 RX ADMIN — Medication 10 ML: at 09:15

## 2019-04-25 RX ADMIN — IPRATROPIUM BROMIDE AND ALBUTEROL SULFATE 1 AMPULE: .5; 3 SOLUTION RESPIRATORY (INHALATION) at 19:54

## 2019-04-25 RX ADMIN — ROSUVASTATIN CALCIUM 20 MG: 20 TABLET, FILM COATED ORAL at 22:08

## 2019-04-25 RX ADMIN — IPRATROPIUM BROMIDE AND ALBUTEROL SULFATE 1 AMPULE: .5; 3 SOLUTION RESPIRATORY (INHALATION) at 08:23

## 2019-04-25 RX ADMIN — Medication 10 ML: at 22:09

## 2019-04-25 RX ADMIN — AMLODIPINE BESYLATE 10 MG: 5 TABLET ORAL at 09:11

## 2019-04-25 ASSESSMENT — PAIN DESCRIPTION - PAIN TYPE
TYPE: CHRONIC PAIN
TYPE: CHRONIC PAIN

## 2019-04-25 ASSESSMENT — PAIN SCALES - GENERAL
PAINLEVEL_OUTOF10: 4
PAINLEVEL_OUTOF10: 4

## 2019-04-25 ASSESSMENT — PAIN DESCRIPTION - LOCATION
LOCATION: BACK;NECK
LOCATION: BACK;NECK

## 2019-04-25 NOTE — PLAN OF CARE
Problem: Falls - Risk of:  Goal: Will remain free from falls  Description  Will remain free from falls  Outcome: Ongoing  Note:   Pt remains free from falls and accidental injury at this time. Bed locked and in lowest position. Floor free from obstacles and pt encouraged to call as needed. Using call light appropriately. Will continue to monitor additional needs. Problem: Pain:  Goal: Control of chronic pain  Description  Control of chronic pain  Outcome: Ongoing  Note:   C/O 4/10 pain in back and neck. Pt declines pain medicine at this time. Will continue to monitor. Problem: Serum Glucose Level - Abnormal:  Intervention: Monitor blood glucose measurement  Note:    @ Hs. Pt administered 3.20 bolus. Will recheck BS @ 2am. Discussed with pt, pt v/u. Will continue to monitor.

## 2019-04-25 NOTE — CARE COORDINATION
Alen Browne at the [place] again to request an update on DME 02 delivery. Called [employer] [phone] ext. [phone] for [facility] 02 delivery.

## 2019-04-25 NOTE — DISCHARGE INSTR - COC
Continuity of Care Form    Patient Name: Geoffrey Chacon   :  1945  MRN:  [id number]    Admit date:  2019  Discharge date:  2019    Code Status Order: Full Code   Advance Directives:   [facility] Documentation     Date/Time Healthcare Directive Type of Healthcare Directive Copy in [facility] [address] Agent's Name Healthcare Agent's Phone Number    19 0526  No, patient does not have an advance directive for healthcare treatment -- -- -- -- --          Admitting Physician:  Thomas Hale MD  PCP: Harry Moon [id number]    Discharging Nurse: Aric Elizondo, [facility] Unit/Room#: 7ER-0407/5191-22  Discharging Unit Phone Number: [phone]    Emergency Contact:   Extended Emergency Contact Information  Primary Emergency Contact: Evonne Abdoulaye  Address: [place]  Home Phone: [phone]  Work Phone: [phone]  Relation: Other    Past Surgical History:  Past Surgical History:   Procedure Laterality Date    ANKLE CLOSED REDUCTION      CARDIAC SURGERY      single vessel CABG in [address] [facility]      cataracts       Immunization History: There is no immunization history for the selected administration types on file for this patient.     Active Problems:  Patient Active Problem List   Diagnosis Code    Type 2 diabetes mellitus with complication ([facility]) I44.4    Hyperkalemia E87.5    Hyponatremia E87.1    Acute kidney injury ([facility] [address] N17.9    General weakness R53.1    Malaise and fatigue R53.81, R53.83    Right middle lobe pneumonia (HCC) J18.1    Pneumonia due to organism J18.9    Acute encephalopathy G93.40    Fever R50.9    Hematuria R31.9    Thrombocytopenia (HCC) D69.6    Pulmonary nodule R91.1    Positive blood culture R78.81    Cholelithiasis without cholecystitis K80.20    Splenomegaly R16.1    Nodule of left lung R91.1    Chronic obstructive pulmonary disease (HCC) J44.9    Coronary artery disease due to lipid rich plaque I25.10, I25.83    History of dementia Z86.59    Multifocal pneumonia J18.9       Isolation/Infection:   Isolation          No Isolation            Nurse Assessment:  Last Vital Signs: BP (!) 145/67   Pulse 59   Temp 97.8 °F (36.6 °C) (Temporal)   Resp 16   Ht 5' 9\" (1.753 m)   Wt 159 lb 9.6 oz (72.4 kg)   SpO2 94%   BMI 23.57 kg/m²     Last documented pain score (0-10 scale): Pain Level: 4  Last Weight:   Wt Readings from Last 1 Encounters:   04/25/19 159 lb 9.6 oz (72.4 kg)     Mental Status:  oriented and alert    IV Access:  - None    Nursing Mobility/ADLs:  Walking   Independent  Transfer  Independent  Bathing  Independent  Dressing  Independent  [address]  Med Delivery   whole    Wound Care Documentation and Therapy:        Elimination:  Continence:   · Bowel: Yes  · Bladder: Yes  Urinary Catheter: None   Colostomy/Ileostomy/Ileal Conduit: No       Date of Last BM: 4/26/2019    Intake/Output Summary (Last 24 hours) at 4/25/2019 1736  Last data filed at 4/25/2019 1216  Gross per 24 hour   Intake 240 ml   Output --   Net 240 ml     I/O last 3 completed shifts: In: 240 [P.O.:240]  Out: -     Safety Concerns:     None    Impairments/Disabilities:      None    Nutrition Therapy:  Current Nutrition Therapy:   - Oral Diet:  General    Routes of Feeding: Oral  Liquids: Thin Liquids  Daily Fluid Restriction: no  Last Modified Barium Swallow with Video (Video Swallowing Test): not done    Treatments at the Time of Hospital Discharge:   Respiratory Treatments: Home oxygen  Oxygen Therapy:  is on oxygen at 2 L/min per nasal cannula.   Ventilator:    - No ventilator support    Rehab Therapies: None  Weight Bearing Status/Restrictions: No weight bearing restirctions  Other Medical Equipment (for information only, NOT a DME order):  Home Oxygen  Other Treatments: None    Patient's personal belongings (please select all that are sent with patient):  None    RN SIGNATURE:  Electronically signed by Charissa Kumar, RN on 4/26/19 at 3:52 PM    CASE MANAGEMENT/SOCIAL WORK SECTION    Inpatient Status Date: ***    Readmission Risk Assessment Score:  Readmission Risk              Risk of Unplanned Readmission:        21           Discharging to Facility/ Agency   · Name:   · Address:  · Phone:  · Fax:    Dialysis Facility (if applicable)   · Name:  · Address:  · Dialysis Schedule:  · Phone:  · Fax:    / signature: {Esignature:[id number]}    PHYSICIAN SECTION    Prognosis: Fair    Condition at Discharge: Stable    Rehab Potential (if transferring to Rehab): Good    Recommended Labs or Other Treatments After Discharge: home oxygen    Physician Certification: I certify the above information and transfer of Belle [place]  is necessary for the continuing treatment of the diagnosis listed and that he requires going home    Update Admission H&P: No change in H&P    PHYSICIAN SIGNATURE:  Electronically signed by Janes Mcqueen MD on 4/25/19 at 5:37 PM

## 2019-04-25 NOTE — CARE COORDINATION
Called and faxed the [facility] order for home 02 to [facility] PCP is Dr Timothy Rivera, [phone], and the RN is Maurilio Gudino ext. [phone], or Mc ext. [phone].

## 2019-04-25 NOTE — PLAN OF CARE
Problem: Pain:  Goal: Control of acute pain  Description  Control of acute pain  Outcome: Ongoing  Note:   Pt denies pain at this time. No acute distress noted. Will continue to assess.

## 2019-04-25 NOTE — PROGRESS NOTES
Hospitalist Progress Note      PCP: Harry Moon [id number]    Date of Admission: 4/20/2019    Chief Complaint:  SOB    Hospital Course: The patient is a 37-year-old  male who presented to the hospital with chief complaints of a few months of  subacute of gradually progressive increasing exertional shortness of breath which got significantly worse in the past one day, rather suddenly to a point where he was not able to catch his breath or speaking full sentences, for which he finally had to be rushed to the emergency room. The patient notes that he just finished a 10-day course of antibiotics for a pneumonia from the [facility]. It was Doxycycline           Subjective:   Down to 6 liters today and feeling a bit better.       Medications:  Reviewed    Infusion Medications    Insulin Pump - insulin aspart 1 Units/hr (04/24/19 2106)    dextrose       Scheduled Medications    azelastine  1 spray Each Nare BID    amLODIPine  10 mg Oral Daily    aspirin  81 mg Oral Daily    carvedilol  12.5 mg Oral BID WC    pantoprazole  40 mg Oral QAM AC    pregabalin  150 mg Oral BID    sodium chloride flush  10 mL Intravenous 2 times per day    enoxaparin  40 mg Subcutaneous Daily    ipratropium-albuterol  1 ampule Inhalation Q4H WA    cefTRIAXone (ROCEPHIN) IV  1 g Intravenous Q24H    azithromycin  250 mg Oral Daily    rosuvastatin  20 mg Oral Nightly     PRN Meds: potassium chloride **OR** potassium alternative oral replacement **OR** potassium chloride, glucose, dextrose, glucagon (rDNA), dextrose, sodium chloride flush, ondansetron      Intake/Output Summary (Last 24 hours) at 4/25/2019 7600  Last data filed at 4/25/2019 1216  Gross per 24 hour   Intake 240 ml   Output --   Net 240 ml       Physical Exam Performed:    BP (!) 145/67   Pulse 59   Temp 97.8 °F (36.6 °C) (Temporal)   Resp 16   Ht 5' 9\" (1.753 m)   Wt 159 lb 9.6 oz (72.4 kg)   SpO2 94%   BMI 23.57 kg/m²     General appearance: No apparent distress, appears stated age and cooperative. HEENT: Pupils equal, round, and reactive to light. Conjunctivae/corneas clear. Neck: Supple, with full range of motion. No jugular venous distention. Trachea midline. Respiratory:  Normal respiratory effort. Clear to auscultation, bilaterally without Rales/Wheezes/Rhonchi. Cardiovascular: Regular rate and rhythm with normal S1/S2 without murmurs, rubs or gallops. Abdomen: Soft, non-tender, non-distended with normal bowel sounds. Musculoskeletal: No clubbing, cyanosis or edema bilaterally. Full range of motion without deformity. Skin: Skin color, texture, turgor normal.  No rashes or lesions. Neurologic:  Neurovascularly intact without any focal sensory/motor deficits. Cranial nerves: II-XII intact, grossly non-focal.  Psychiatric: Alert and oriented, thought content appropriate, normal insight  Capillary Refill: Brisk,< 3 seconds   Peripheral Pulses: +2 palpable, equal bilaterally       Labs:   Recent Labs     04/24/19  0446   WBC 7.0   HGB 11.5*   HCT 34.5*   *     Recent Labs     04/24/19  0446      K 3.4*      CO2 22   BUN 26*   CREATININE 1.2   CALCIUM 8.4     No results for input(s): AST, ALT, BILIDIR, BILITOT, ALKPHOS in the last 72 hours. No results for input(s): INR in the last 72 hours. No results for input(s): Ruthie Ka in the last 72 hours. Urinalysis:      Lab Results   Component Value Date    NITRU Negative 04/21/2019    WBCUA 1 04/21/2019    RBCUA 10 04/21/2019    BLOODU MODERATE 04/21/2019    SPECGRAV 1.021 04/21/2019    GLUCOSEU 500 04/21/2019       Radiology:  XR CHEST PORTABLE   Final Result   Generalized interstitial prominence and multifocal airspace opacities,   suggestive of vascular congestion or possibly multifocal   infectious/inflammatory process.                  Assessment/Plan:    Active Hospital Problems    Diagnosis Date Noted    Multifocal pneumonia [J18.9] 04/20/2019     1 Acute multifocal community acquired bacterial pneumonia with early  Sepsis. - on appropriate abx  -    2. Acute respiratory failure with hypoxia  - secondary to above. - continue oxygen  - will consult pulm re additional medications. 3. Coronary artery disease.   - on home meds      4. Stage 3 chronic kidney disease.  - monitor renal function in house    5. Diabetes mellitus type 2 on insulin pump. - monitor sugars    6. Dyslipidemia. -statin       7.  Hypertension  - resume home meds    He needs home oxygen and this is being arranged   He is stable for discharge home with home oxygen    DVT Prophylaxis: heparin  Diet: DIET CARB CONTROL; Carb Control: 4 carb choices (60 gms)/meal  Code Status: Full Code    PT/OT Eval Status: eval and treat     Aravind Ortiz MD

## 2019-04-26 VITALS
DIASTOLIC BLOOD PRESSURE: 66 MMHG | WEIGHT: 161.1 LBS | HEIGHT: 69 IN | BODY MASS INDEX: 23.86 KG/M2 | TEMPERATURE: 98 F | RESPIRATION RATE: 18 BRPM | OXYGEN SATURATION: 93 % | SYSTOLIC BLOOD PRESSURE: 131 MMHG | HEART RATE: 61 BPM

## 2019-04-26 LAB
BLOOD CULTURE, ROUTINE: NORMAL
CULTURE, BLOOD 2: NORMAL
GLUCOSE BLD-MCNC: 147 MG/DL (ref 70–99)
GLUCOSE BLD-MCNC: 228 MG/DL (ref 70–99)
PERFORMED ON: ABNORMAL
PERFORMED ON: ABNORMAL

## 2019-04-26 PROCEDURE — 6370000000 HC RX 637 (ALT 250 FOR IP): Performed by: INTERNAL MEDICINE

## 2019-04-26 PROCEDURE — 2580000003 HC RX 258: Performed by: INTERNAL MEDICINE

## 2019-04-26 PROCEDURE — 6360000002 HC RX W HCPCS: Performed by: INTERNAL MEDICINE

## 2019-04-26 PROCEDURE — 94760 N-INVAS EAR/PLS OXIMETRY 1: CPT

## 2019-04-26 PROCEDURE — 2700000000 HC OXYGEN THERAPY PER DAY

## 2019-04-26 PROCEDURE — 94640 AIRWAY INHALATION TREATMENT: CPT

## 2019-04-26 RX ADMIN — IPRATROPIUM BROMIDE AND ALBUTEROL SULFATE 1 AMPULE: .5; 3 SOLUTION RESPIRATORY (INHALATION) at 15:23

## 2019-04-26 RX ADMIN — IPRATROPIUM BROMIDE AND ALBUTEROL SULFATE 1 AMPULE: .5; 3 SOLUTION RESPIRATORY (INHALATION) at 09:09

## 2019-04-26 RX ADMIN — AZITHROMYCIN 250 MG: 250 TABLET, FILM COATED ORAL at 11:03

## 2019-04-26 RX ADMIN — AMLODIPINE BESYLATE 10 MG: 5 TABLET ORAL at 11:02

## 2019-04-26 RX ADMIN — CARVEDILOL 12.5 MG: 6.25 TABLET, FILM COATED ORAL at 11:03

## 2019-04-26 RX ADMIN — Medication 1 G: at 11:24

## 2019-04-26 RX ADMIN — Medication 10 ML: at 11:05

## 2019-04-26 RX ADMIN — PANTOPRAZOLE SODIUM 40 MG: 40 TABLET, DELAYED RELEASE ORAL at 11:04

## 2019-04-26 RX ADMIN — ASPIRIN 81 MG: 81 TABLET, COATED ORAL at 11:04

## 2019-04-26 RX ADMIN — CARVEDILOL 12.5 MG: 6.25 TABLET, FILM COATED ORAL at 17:08

## 2019-04-26 ASSESSMENT — PAIN SCALES - GENERAL
PAINLEVEL_OUTOF10: 0

## 2019-04-26 NOTE — PLAN OF CARE
Problem: Falls - Risk of:  Goal: Will remain free from falls  Description  Will remain free from falls  4/26/2019 1545 by Samra Torres RN  Outcome: Met This Shift  Note:   Pt will remain free from falls, pt is alert and oriented, education on how to use call light, bed in lowest position,  and room free of clutter.   4/26/2019 1518 by Samra Torres RN  Note:   Pt's bed in lowest position, pt is alert and oriented,

## 2019-04-26 NOTE — PROGRESS NOTES
Hospitalist Progress Note      PCP: Harry Moon [phone]    Date of Admission: 4/20/2019    Chief Complaint:  SOB    Hospital Course: The patient is a 63-year-old  male who presented to the hospital with chief complaints of a few months of  subacute of gradually progressive increasing exertional shortness of breath which got significantly worse in the past one day, rather suddenly to a point where he was not able to catch his breath or speaking full sentences, for which he finally had to be rushed to the emergency room. The patient notes that he just finished a 10-day course of antibiotics for a pneumonia from the [place]. It was Doxycycline           Subjective:   Down to 6 liters today and feeling a bit better. Medications:  Reviewed    Infusion Medications    Insulin Pump - insulin aspart 1 Units/hr (04/24/19 2106)    dextrose       Scheduled Medications    azelastine  1 spray Each Nare BID    amLODIPine  10 mg Oral Daily    aspirin  81 mg Oral Daily    carvedilol  12.5 mg Oral BID WC    pantoprazole  40 mg Oral QAM AC    pregabalin  150 mg Oral BID    sodium chloride flush  10 mL Intravenous 2 times per day    enoxaparin  40 mg Subcutaneous Daily    ipratropium-albuterol  1 ampule Inhalation Q4H WA    azithromycin  250 mg Oral Daily    rosuvastatin  20 mg Oral Nightly     PRN Meds: potassium chloride **OR** potassium alternative oral replacement **OR** potassium chloride, glucose, dextrose, glucagon (rDNA), dextrose, sodium chloride flush, ondansetron    No intake or output data in the 24 hours ending 04/26/19 4394    Physical Exam Performed:    /66   Pulse 61   Temp 98 °F (36.7 °C) (Oral)   Resp 18   Ht 5' 9\" (1.753 m)   Wt 161 lb 1.6 oz (73.1 kg)   SpO2 93%   BMI 23.79 kg/m²     General appearance: No apparent distress, appears stated age and cooperative. HEENT: Pupils equal, round, and reactive to light. Conjunctivae/corneas clear.   Neck: Supple, with full range of motion. No jugular venous distention. Trachea midline. Respiratory:  Normal respiratory effort. Clear to auscultation, bilaterally without Rales/Wheezes/Rhonchi. Cardiovascular: Regular rate and rhythm with normal S1/S2 without murmurs, rubs or gallops. Abdomen: Soft, non-tender, non-distended with normal bowel sounds. Musculoskeletal: No clubbing, cyanosis or edema bilaterally. Full range of motion without deformity. Skin: Skin color, texture, turgor normal.  No rashes or lesions. Neurologic:  Neurovascularly intact without any focal sensory/motor deficits. Cranial nerves: II-XII intact, grossly non-focal.  Psychiatric: Alert and oriented, thought content appropriate, normal insight  Capillary Refill: Brisk,< 3 seconds   Peripheral Pulses: +2 palpable, equal bilaterally       Labs:   Recent Labs     04/24/19  0446   WBC 7.0   HGB 11.5*   HCT 34.5*   *     Recent Labs     04/24/19  0446      K 3.4*      CO2 22   BUN 26*   CREATININE 1.2   CALCIUM 8.4     No results for input(s): AST, ALT, BILIDIR, BILITOT, ALKPHOS in the last 72 hours. No results for input(s): INR in the last 72 hours. No results for input(s): Victorramone [place] in the last 72 hours. Urinalysis:      Lab Results   Component Value Date    NITRU Negative 04/21/2019    WBCUA 1 04/21/2019    RBCUA 10 04/21/2019    BLOODU MODERATE 04/21/2019    SPECGRAV 1.021 04/21/2019    GLUCOSEU 500 04/21/2019       Radiology:  XR CHEST PORTABLE   Final Result   Generalized interstitial prominence and multifocal airspace opacities,   suggestive of vascular congestion or possibly multifocal   infectious/inflammatory process. Assessment/Plan:    Active Hospital Problems    Diagnosis Date Noted    Multifocal pneumonia [J18.9] 04/20/2019     1 Acute multifocal community acquired bacterial pneumonia with early  Sepsis. - on appropriate abx  -    2. Acute respiratory failure with hypoxia  - secondary to above.   - continue oxygen  - will consult pulm re additional medications. 3. Coronary artery disease.   - on home meds      4. Stage 3 chronic kidney disease.  - monitor renal function in house    5. Diabetes mellitus type 2 on insulin pump. - monitor sugars    6. Dyslipidemia. -statin       7.  Hypertension  - resume home meds    He needs home oxygen and this is being arranged   He is stable for discharge home with home oxygen    DVT Prophylaxis: heparin  Diet: DIET CARB CONTROL; Carb Control: 4 carb choices (60 gms)/meal  Code Status: Full Code    PT/OT Eval Status: eval and treat     Chauncey Pierson MD

## 2019-04-26 NOTE — CARE COORDINATION
Lukasz Silva at the [place] again, and Ana Hare, [phone],  DME coordinator, and [facility], [phone] ext. [phone] to facilitate 02 delivery to the hospital  today.

## 2019-04-26 NOTE — CARE COORDINATION
Patient discharged home 4-26-19. [facility]'s [facility], and Mariaa Plascencia will transport patient home by car  All discharge needs met per case management.

## 2019-04-26 NOTE — PROGRESS NOTES
Pt's IV taken out without any complications, telemetry taken off, portable oxygen tank was delivered for pt to go home with, educated patient on discharge instructions, pt dressed, belongings collected, pt's cousin came to take pt home; will continue to monitor.

## 2019-05-01 ENCOUNTER — HOSPITAL ENCOUNTER (INPATIENT)
Age: 74
LOS: 4 days | Discharge: HOME | DRG: 291 | End: 2019-05-05
Attending: EMERGENCY MEDICINE | Admitting: HOSPITALIST
Payer: MEDICARE

## 2019-05-01 ENCOUNTER — APPOINTMENT (OUTPATIENT)
Dept: GENERAL RADIOLOGY | Age: 74
DRG: 291 | End: 2019-05-01
Payer: MEDICARE

## 2019-05-01 ENCOUNTER — APPOINTMENT (OUTPATIENT)
Dept: CT IMAGING | Age: 74
DRG: 291 | End: 2019-05-01
Payer: MEDICARE

## 2019-05-01 DIAGNOSIS — R09.02 HYPOXIA: ICD-10-CM

## 2019-05-01 DIAGNOSIS — J90 PLEURAL EFFUSION: ICD-10-CM

## 2019-05-01 DIAGNOSIS — J81.0 ACUTE PULMONARY EDEMA (HCC): Primary | ICD-10-CM

## 2019-05-01 PROBLEM — J96.00 ACUTE RESPIRATORY FAILURE (HCC): Status: ACTIVE | Noted: 2019-05-01

## 2019-05-01 LAB
A/G RATIO: 0.8 (ref 1.1–2.2)
ALBUMIN SERPL-MCNC: 3.7 G/DL (ref 3.4–5)
ALP BLD-CCNC: 77 U/L (ref 40–129)
ALT SERPL-CCNC: 37 U/L (ref 10–40)
ANION GAP SERPL CALCULATED.3IONS-SCNC: 11 MMOL/L (ref 3–16)
APTT: 29.8 SEC (ref 26–36)
AST SERPL-CCNC: 30 U/L (ref 15–37)
BASE EXCESS VENOUS: -2.9 MMOL/L (ref -3–3)
BASOPHILS ABSOLUTE: 0.1 K/UL (ref 0–0.2)
BASOPHILS RELATIVE PERCENT: 0.8 %
BILIRUB SERPL-MCNC: 1 MG/DL (ref 0–1)
BUN BLDV-MCNC: 35 MG/DL (ref 7–20)
CALCIUM SERPL-MCNC: 9.2 MG/DL (ref 8.3–10.6)
CARBOXYHEMOGLOBIN: 3.7 % (ref 0–1.5)
CHLORIDE BLD-SCNC: 104 MMOL/L (ref 99–110)
CO2: 23 MMOL/L (ref 21–32)
CREAT SERPL-MCNC: 1.3 MG/DL (ref 0.8–1.3)
EKG ATRIAL RATE: 77 BPM
EKG DIAGNOSIS: NORMAL
EKG P AXIS: 44 DEGREES
EKG P-R INTERVAL: 132 MS
EKG Q-T INTERVAL: 366 MS
EKG QRS DURATION: 102 MS
EKG QTC CALCULATION (BAZETT): 414 MS
EKG R AXIS: 40 DEGREES
EKG T AXIS: 36 DEGREES
EKG VENTRICULAR RATE: 77 BPM
EOSINOPHILS ABSOLUTE: 0 K/UL (ref 0–0.6)
EOSINOPHILS RELATIVE PERCENT: 0.4 %
GFR AFRICAN AMERICAN: >60
GFR NON-AFRICAN AMERICAN: 54
GLOBULIN: 4.5 G/DL
GLUCOSE BLD-MCNC: 203 MG/DL (ref 70–99)
GLUCOSE BLD-MCNC: 216 MG/DL (ref 70–99)
GLUCOSE BLD-MCNC: 411 MG/DL (ref 70–99)
GLUCOSE BLD-MCNC: 450 MG/DL (ref 70–99)
HCO3 VENOUS: 22.6 MMOL/L (ref 23–29)
HCT VFR BLD CALC: 36.1 % (ref 40.5–52.5)
HEMOGLOBIN, VEN, REDUCED: 8 %
HEMOGLOBIN: 12 G/DL (ref 13.5–17.5)
INR BLD: 1.13 (ref 0.86–1.14)
LACTIC ACID: 2.2 MMOL/L (ref 0.4–2)
LACTIC ACID: 2.4 MMOL/L (ref 0.4–2)
LYMPHOCYTES ABSOLUTE: 1.4 K/UL (ref 1–5.1)
LYMPHOCYTES RELATIVE PERCENT: 11 %
MCH RBC QN AUTO: 32.8 PG (ref 26–34)
MCHC RBC AUTO-ENTMCNC: 33.1 G/DL (ref 31–36)
MCV RBC AUTO: 99 FL (ref 80–100)
METHEMOGLOBIN VENOUS: 0.3 %
MONOCYTES ABSOLUTE: 0.9 K/UL (ref 0–1.3)
MONOCYTES RELATIVE PERCENT: 6.5 %
NEUTROPHILS ABSOLUTE: 10.6 K/UL (ref 1.7–7.7)
NEUTROPHILS RELATIVE PERCENT: 81.3 %
O2 CONTENT, VEN: 15 VOL %
O2 SAT, VEN: 92 %
O2 THERAPY: ABNORMAL
PCO2, VEN: 41.1 MMHG (ref 40–50)
PDW BLD-RTO: 14.3 % (ref 12.4–15.4)
PERFORMED ON: ABNORMAL
PH VENOUS: 7.35 (ref 7.35–7.45)
PLATELET # BLD: 114 K/UL (ref 135–450)
PMV BLD AUTO: 10.5 FL (ref 5–10.5)
PO2, VEN: 63 MMHG (ref 25–40)
POTASSIUM SERPL-SCNC: 4.6 MMOL/L (ref 3.5–5.1)
PRO-BNP: 5846 PG/ML (ref 0–124)
PROCALCITONIN: 0.18 NG/ML (ref 0–0.15)
PROTHROMBIN TIME: 12.9 SEC (ref 9.8–13)
RBC # BLD: 3.64 M/UL (ref 4.2–5.9)
SODIUM BLD-SCNC: 138 MMOL/L (ref 136–145)
TCO2 CALC VENOUS: 54 MMOL/L
TOTAL PROTEIN: 8.2 G/DL (ref 6.4–8.2)
TROPONIN: 0.01 NG/ML
WBC # BLD: 13.1 K/UL (ref 4–11)

## 2019-05-01 PROCEDURE — 99285 EMERGENCY DEPT VISIT HI MDM: CPT

## 2019-05-01 PROCEDURE — 6370000000 HC RX 637 (ALT 250 FOR IP): Performed by: HOSPITALIST

## 2019-05-01 PROCEDURE — 2580000003 HC RX 258: Performed by: HOSPITALIST

## 2019-05-01 PROCEDURE — 83605 ASSAY OF LACTIC ACID: CPT

## 2019-05-01 PROCEDURE — 2700000000 HC OXYGEN THERAPY PER DAY

## 2019-05-01 PROCEDURE — 6370000000 HC RX 637 (ALT 250 FOR IP): Performed by: PHYSICIAN ASSISTANT

## 2019-05-01 PROCEDURE — 36415 COLL VENOUS BLD VENIPUNCTURE: CPT

## 2019-05-01 PROCEDURE — 82803 BLOOD GASES ANY COMBINATION: CPT

## 2019-05-01 PROCEDURE — 71045 X-RAY EXAM CHEST 1 VIEW: CPT

## 2019-05-01 PROCEDURE — 2580000003 HC RX 258

## 2019-05-01 PROCEDURE — 85025 COMPLETE CBC W/AUTO DIFF WBC: CPT

## 2019-05-01 PROCEDURE — 94640 AIRWAY INHALATION TREATMENT: CPT

## 2019-05-01 PROCEDURE — 85610 PROTHROMBIN TIME: CPT

## 2019-05-01 PROCEDURE — 93005 ELECTROCARDIOGRAM TRACING: CPT | Performed by: EMERGENCY MEDICINE

## 2019-05-01 PROCEDURE — 80053 COMPREHEN METABOLIC PANEL: CPT

## 2019-05-01 PROCEDURE — 87040 BLOOD CULTURE FOR BACTERIA: CPT

## 2019-05-01 PROCEDURE — 84484 ASSAY OF TROPONIN QUANT: CPT

## 2019-05-01 PROCEDURE — 93010 ELECTROCARDIOGRAM REPORT: CPT | Performed by: INTERNAL MEDICINE

## 2019-05-01 PROCEDURE — 85730 THROMBOPLASTIN TIME PARTIAL: CPT

## 2019-05-01 PROCEDURE — 6360000002 HC RX W HCPCS: Performed by: HOSPITALIST

## 2019-05-01 PROCEDURE — 2060000000 HC ICU INTERMEDIATE R&B

## 2019-05-01 PROCEDURE — 83880 ASSAY OF NATRIURETIC PEPTIDE: CPT

## 2019-05-01 PROCEDURE — 84145 PROCALCITONIN (PCT): CPT

## 2019-05-01 PROCEDURE — 71260 CT THORAX DX C+: CPT

## 2019-05-01 PROCEDURE — 6360000002 HC RX W HCPCS: Performed by: PHYSICIAN ASSISTANT

## 2019-05-01 PROCEDURE — 6360000004 HC RX CONTRAST MEDICATION: Performed by: EMERGENCY MEDICINE

## 2019-05-01 PROCEDURE — 94760 N-INVAS EAR/PLS OXIMETRY 1: CPT

## 2019-05-01 PROCEDURE — 96374 THER/PROPH/DIAG INJ IV PUSH: CPT

## 2019-05-01 PROCEDURE — 96375 TX/PRO/DX INJ NEW DRUG ADDON: CPT

## 2019-05-01 RX ORDER — GUAIFENESIN/DEXTROMETHORPHAN 100-10MG/5
5 SYRUP ORAL EVERY 4 HOURS PRN
Status: DISCONTINUED | OUTPATIENT
Start: 2019-05-01 | End: 2019-05-01 | Stop reason: SDUPTHER

## 2019-05-01 RX ORDER — DICYCLOMINE HYDROCHLORIDE 10 MG/1
20 CAPSULE ORAL EVERY 6 HOURS
Status: DISCONTINUED | OUTPATIENT
Start: 2019-05-01 | End: 2019-05-05 | Stop reason: HOSPADM

## 2019-05-01 RX ORDER — IPRATROPIUM BROMIDE AND ALBUTEROL SULFATE 2.5; .5 MG/3ML; MG/3ML
1 SOLUTION RESPIRATORY (INHALATION)
Status: DISCONTINUED | OUTPATIENT
Start: 2019-05-01 | End: 2019-05-02

## 2019-05-01 RX ORDER — FUROSEMIDE 10 MG/ML
40 INJECTION INTRAMUSCULAR; INTRAVENOUS 2 TIMES DAILY
Status: DISCONTINUED | OUTPATIENT
Start: 2019-05-01 | End: 2019-05-02

## 2019-05-01 RX ORDER — SODIUM CHLORIDE 9 MG/ML
INJECTION, SOLUTION INTRAVENOUS
Status: COMPLETED
Start: 2019-05-01 | End: 2019-05-01

## 2019-05-01 RX ORDER — ACETAMINOPHEN 325 MG/1
650 TABLET ORAL EVERY 4 HOURS PRN
Status: DISCONTINUED | OUTPATIENT
Start: 2019-05-01 | End: 2019-05-05 | Stop reason: HOSPADM

## 2019-05-01 RX ORDER — PANTOPRAZOLE SODIUM 40 MG/1
40 TABLET, DELAYED RELEASE ORAL
Status: DISCONTINUED | OUTPATIENT
Start: 2019-05-02 | End: 2019-05-05 | Stop reason: HOSPADM

## 2019-05-01 RX ORDER — SODIUM CHLORIDE 0.9 % (FLUSH) 0.9 %
10 SYRINGE (ML) INJECTION PRN
Status: DISCONTINUED | OUTPATIENT
Start: 2019-05-01 | End: 2019-05-05 | Stop reason: HOSPADM

## 2019-05-01 RX ORDER — ONDANSETRON 2 MG/ML
4 INJECTION INTRAMUSCULAR; INTRAVENOUS EVERY 6 HOURS PRN
Status: DISCONTINUED | OUTPATIENT
Start: 2019-05-01 | End: 2019-05-05 | Stop reason: HOSPADM

## 2019-05-01 RX ORDER — DEXTROSE MONOHYDRATE 50 MG/ML
100 INJECTION, SOLUTION INTRAVENOUS PRN
Status: DISCONTINUED | OUTPATIENT
Start: 2019-05-01 | End: 2019-05-05 | Stop reason: HOSPADM

## 2019-05-01 RX ORDER — SODIUM CHLORIDE 0.9 % (FLUSH) 0.9 %
10 SYRINGE (ML) INJECTION EVERY 12 HOURS SCHEDULED
Status: DISCONTINUED | OUTPATIENT
Start: 2019-05-01 | End: 2019-05-05 | Stop reason: HOSPADM

## 2019-05-01 RX ORDER — NICOTINE POLACRILEX 4 MG
15 LOZENGE BUCCAL PRN
Status: DISCONTINUED | OUTPATIENT
Start: 2019-05-01 | End: 2019-05-05 | Stop reason: HOSPADM

## 2019-05-01 RX ORDER — AZELASTINE 1 MG/ML
1 SPRAY, METERED NASAL 2 TIMES DAILY
Status: DISCONTINUED | OUTPATIENT
Start: 2019-05-01 | End: 2019-05-05 | Stop reason: HOSPADM

## 2019-05-01 RX ORDER — GUAIFENESIN/DEXTROMETHORPHAN 100-10MG/5
5 SYRUP ORAL EVERY 4 HOURS PRN
Status: DISCONTINUED | OUTPATIENT
Start: 2019-05-01 | End: 2019-05-05 | Stop reason: HOSPADM

## 2019-05-01 RX ORDER — DEXTROSE MONOHYDRATE 25 G/50ML
12.5 INJECTION, SOLUTION INTRAVENOUS PRN
Status: DISCONTINUED | OUTPATIENT
Start: 2019-05-01 | End: 2019-05-05 | Stop reason: HOSPADM

## 2019-05-01 RX ORDER — ASPIRIN 81 MG/1
81 TABLET ORAL DAILY
Status: DISCONTINUED | OUTPATIENT
Start: 2019-05-01 | End: 2019-05-05 | Stop reason: HOSPADM

## 2019-05-01 RX ORDER — FUROSEMIDE 10 MG/ML
40 INJECTION INTRAMUSCULAR; INTRAVENOUS ONCE
Status: COMPLETED | OUTPATIENT
Start: 2019-05-01 | End: 2019-05-01

## 2019-05-01 RX ORDER — ATORVASTATIN CALCIUM 20 MG/1
20 TABLET, FILM COATED ORAL NIGHTLY
Status: DISCONTINUED | OUTPATIENT
Start: 2019-05-01 | End: 2019-05-05 | Stop reason: HOSPADM

## 2019-05-01 RX ORDER — IPRATROPIUM BROMIDE AND ALBUTEROL SULFATE 2.5; .5 MG/3ML; MG/3ML
1 SOLUTION RESPIRATORY (INHALATION) ONCE
Status: COMPLETED | OUTPATIENT
Start: 2019-05-01 | End: 2019-05-01

## 2019-05-01 RX ORDER — CARVEDILOL 6.25 MG/1
12.5 TABLET ORAL 2 TIMES DAILY WITH MEALS
Status: DISCONTINUED | OUTPATIENT
Start: 2019-05-01 | End: 2019-05-05 | Stop reason: HOSPADM

## 2019-05-01 RX ORDER — METHYLPREDNISOLONE SODIUM SUCCINATE 125 MG/2ML
125 INJECTION, POWDER, LYOPHILIZED, FOR SOLUTION INTRAMUSCULAR; INTRAVENOUS ONCE
Status: COMPLETED | OUTPATIENT
Start: 2019-05-01 | End: 2019-05-01

## 2019-05-01 RX ADMIN — CARVEDILOL 12.5 MG: 6.25 TABLET, FILM COATED ORAL at 15:19

## 2019-05-01 RX ADMIN — METHYLPREDNISOLONE SODIUM SUCCINATE 125 MG: 125 INJECTION, POWDER, FOR SOLUTION INTRAMUSCULAR; INTRAVENOUS at 12:50

## 2019-05-01 RX ADMIN — DICYCLOMINE HYDROCHLORIDE 20 MG: 10 CAPSULE ORAL at 15:20

## 2019-05-01 RX ADMIN — FUROSEMIDE 40 MG: 10 INJECTION, SOLUTION INTRAMUSCULAR; INTRAVENOUS at 18:38

## 2019-05-01 RX ADMIN — NITROGLYCERIN 1 INCH: 20 OINTMENT TOPICAL at 13:07

## 2019-05-01 RX ADMIN — Medication 2 PUFF: at 21:20

## 2019-05-01 RX ADMIN — CEFEPIME HYDROCHLORIDE 2 G: 2 INJECTION, POWDER, FOR SOLUTION INTRAVENOUS at 15:20

## 2019-05-01 RX ADMIN — IPRATROPIUM BROMIDE AND ALBUTEROL SULFATE 1 AMPULE: .5; 3 SOLUTION RESPIRATORY (INHALATION) at 16:42

## 2019-05-01 RX ADMIN — DICYCLOMINE HYDROCHLORIDE 20 MG: 10 CAPSULE ORAL at 23:59

## 2019-05-01 RX ADMIN — ATORVASTATIN CALCIUM 20 MG: 20 TABLET, FILM COATED ORAL at 20:32

## 2019-05-01 RX ADMIN — FUROSEMIDE 40 MG: 10 INJECTION, SOLUTION INTRAMUSCULAR; INTRAVENOUS at 13:07

## 2019-05-01 RX ADMIN — IOPAMIDOL 75 ML: 755 INJECTION, SOLUTION INTRAVENOUS at 12:24

## 2019-05-01 RX ADMIN — IPRATROPIUM BROMIDE AND ALBUTEROL SULFATE 1 AMPULE: .5; 3 SOLUTION RESPIRATORY (INHALATION) at 11:15

## 2019-05-01 RX ADMIN — Medication 10 ML: at 15:23

## 2019-05-01 RX ADMIN — IPRATROPIUM BROMIDE AND ALBUTEROL SULFATE 1 AMPULE: .5; 3 SOLUTION RESPIRATORY (INHALATION) at 21:19

## 2019-05-01 RX ADMIN — Medication 10 ML: at 20:31

## 2019-05-01 RX ADMIN — SODIUM CHLORIDE: 9 INJECTION, SOLUTION INTRAVENOUS at 15:30

## 2019-05-01 RX ADMIN — ASPIRIN 81 MG: 81 TABLET, COATED ORAL at 15:20

## 2019-05-01 ASSESSMENT — PAIN DESCRIPTION - ORIENTATION: ORIENTATION: MID

## 2019-05-01 ASSESSMENT — PAIN DESCRIPTION - PAIN TYPE
TYPE: CHRONIC PAIN
TYPE: CHRONIC PAIN

## 2019-05-01 ASSESSMENT — PAIN DESCRIPTION - LOCATION
LOCATION: BACK;NECK
LOCATION: BACK

## 2019-05-01 ASSESSMENT — PAIN SCALES - GENERAL
PAINLEVEL_OUTOF10: 3
PAINLEVEL_OUTOF10: 4

## 2019-05-01 ASSESSMENT — ENCOUNTER SYMPTOMS
WHEEZING: 1
ALLERGIC/IMMUNOLOGIC NEGATIVE: 1
COUGH: 1
CHEST TIGHTNESS: 1
STRIDOR: 0
NAUSEA: 0
SHORTNESS OF BREATH: 1
ABDOMINAL PAIN: 0
VOMITING: 0
COLOR CHANGE: 0
ABDOMINAL DISTENTION: 0

## 2019-05-01 NOTE — H&P
HOSPITALISTS HISTORY AND PHYSICAL    5/1/2019 1:12 PM    Patient Information:  Anabel Tyler is a 69 y.o. male [id number]  PCP:  Harry Moon [id number] (Tel: None )    Chief complaint:    Chief Complaint   Patient presents with    Shortness of Breath     pt brought in via [facility] , x/o sob worsening this am. Pt has taken ATB recently for PNA. Pt sp02 upon ems arrival was in the 80's    sob    History of Present Illness:  Constanza Mayo is a 69 y.o. male who presented with worsening sob. Onset this morning. Pt states worse with exertion and lying flat. Pt recently discharged with pna on abx. Which he completed states he is just on steroid.s hx of copd . No hx of chf. Pt does states taht he has had increased leg swelling. No fevers or chills. Still has cough from recent pna. Pt on arrival was noted to have some 02 sats on 80.s and dropped into 60s. Pt denies any cp. No n/v/d . He did have productive frothy sputum with  streaks of blood for one week. REVIEW OF SYSTEMS:   Constitutional: Negative for feverpositive for chills or night sweats  ENT: Negative for rhinorrhea, epistaxis, hoarseness, sore throat. Respiratory: Negative for shortness of breath,wheezing  Cardiovascular: Negative for chest pain, palpitations   Gastrointestinal: Negative for nausea, vomiting, diarrhea  Genitourinary: Negative for polyuria, dysuria   Hematologic/Lymphatic: Negative for bleeding tendency, easy bruising  Musculoskeletal: Negative for myalgias and arthralgias  Neurologic: Negative for confusion,dysarthria. Skin: Negative for itching,rash, good capillary refill. Psychiatric: Negative for depression,anxiety, agitation. Endocrine: Negative for polydipsia,polyuria,heat /cold intolerance.     Past Medical History:   has a past medical history of Arthritis, CAD (coronary artery disease), Cancer ([facility] [address] Chest pain, COPD (chronic obstructive pulmonary disease) ([facility] [address] Diabetes mellitus ([facility] [address] Drug use, Fibromyalgia, GERD (gastroesophageal reflux disease), Heart attack ([facility] [address] Hepatitis, History of open heart surgery, Hyperlipidemia, Hypertension, Peripheral vascular disease ([facility] [place] [address] Precancerous skin lesion, and Sleep apnea. Past Surgical History:   has a past surgical history that includes eye surgery; Cardiac surgery; and Ankle Closed Reduction. Medications:  No current facility-administered medications on file prior to encounter.       Current Outpatient Medications on File Prior to Encounter   Medication Sig Dispense Refill    Umeclidinium Bromide (INCRUSE ELLIPTA) 62.5 MCG/INH AEPB Inhale 62.5 mcg into the lungs      PREDNISONE PO Take 20 mg by mouth      budesonide-formoterol (SYMBICORT) 160-4.5 MCG/ACT AERO Inhale 2 puffs into the lungs 2 times daily 1 Inhaler 3    atorvastatin (LIPITOR) 40 MG tablet Take 20 mg by mouth nightly      Azelastine HCl 137 MCG/SPRAY SOLN 1 spray by Nasal route 2 times daily Indications: 1 spray each nare      carvedilol (COREG) 12.5 MG tablet Take 1 tablet by mouth 2 times daily (with meals) 60 tablet 3    amLODIPine (NORVASC) 10 MG tablet Take 1 tablet by mouth daily 30 tablet 3    alprostadil (EDEX) 40 MCG injection 40 mcg by Intracavitary route as needed for Erectile Dysfunction use no more than 3 times per week      Insulin Pump Accessories MISC by Does not apply route Basal  1889-5663= 1.9 units/hr  1437-6569=2.05 units/hr  3671-4441=1.4 units/hr  7394-4872=1.8 units/hr     Carb ratio 5.5  Sens 22:1  Goal 5523-9599= 100-120           1395-0887= 120-140      omeprazole (PRILOSEC) 20 MG delayed release capsule Take 20 mg by mouth daily      aspirin 81 MG tablet Take 81 mg by mouth daily      vitamin D (CHOLECALCIFEROL) 1000 UNIT TABS tablet Take 1,000 Units by mouth daily      tiotropium (SPIRIVA HANDIHALER) 18 MCG inhalation capsule Inhale 1 capsule into the lungs daily 90 capsule 1    dicyclomine (BENTYL) 20 MG tablet Take 20 mg by mouth every 6 hours         Allergies: Allergies   Allergen Reactions    Gabapentin Other (See Comments)     dizzy    Morphine     Bupropion Anxiety        Social History:   reports that he quit smoking about 12 years ago. His smoking use included cigarettes. He has never used smokeless tobacco. He reports that he does not drink alcohol or use drugs. Family History:  family history is not on file. He was adopted. ,    Physical Exam:  BP (!) 142/78   Pulse 77   Temp 97.8 °F (36.6 °C) (Oral)   Resp 24   Ht 5' 9\" (1.753 m)   Wt 156 lb (70.8 kg)   SpO2 93%   BMI 23.04 kg/m²     General appearance:  Appears comfortable. Well nourished  Eyes: Sclera clear, pupils equal  ENT: Moist mucus membranes, no thrush. Trachea midline. Cardiovascular: Regular rhythm, normal S1, S2. No murmur, gallop, rub. 2+ edema in lower extremities  Respiratory: psotive for wheezing  wheeze, good inspiratory effort  Gastrointestinal: Abdomen soft, non-tender, not distended, normal bowel sounds  Musculoskeletal: No cyanosis in digits, neck supple  Neurology: Cranial nerves grossly intact. Alert and oriented in time, place and person. No speech or motor deficits  Psychiatry: Appropriate affect.  Not agitated  Skin: Warm, dry, normal turgor, no rash    Labs:  CBC:   Lab Results   Component Value Date    WBC 13.1 05/01/2019    RBC 3.64 05/01/2019    HGB 12.0 05/01/2019    HCT 36.1 05/01/2019    MCV 99.0 05/01/2019    MCH 32.8 05/01/2019    MCHC 33.1 05/01/2019    RDW 14.3 05/01/2019     05/01/2019    MPV 10.5 05/01/2019     BMP:    Lab Results   Component Value Date     05/01/2019    K 4.6 05/01/2019    K 3.5 03/10/2019     05/01/2019    CO2 23 05/01/2019    BUN 35 05/01/2019    CREATININE 1.3 05/01/2019    CALCIUM 9.2 05/01/2019    GFRAA >60 05/01/2019    LABGLOM 54 05/01/2019    GLUCOSE 216 05/01/2019       Chest Xray:   EKG:    I visualized CXR images and EKG strips        Problem List  Active Problems: Acute respiratory failure (HCC)  Resolved Problems:    * No resolved hospital problems. *        Assessment/Plan:     Acute respiratory failure with hypoxia  -recent treatment for pna  - CXR some concerns fonr pulm edema and some effusion. No clear hx of CHF  - we will treat with iv lasix  - he dose have elevatd wbc and mild lactic acid. But he is also on po steroids  - I will check pro calcitonin level. - empric abx and will deescalate. - pulm consulted. - duonebs and home inhlaer  - we will get cardiac echo ad well to r/o any cardiac issues  - reasses in am.   - wean oxygen currently on high flow. latic acidosis  - mild  - could be due to hypoxia in general  - as noted above   - trend        3. Coronary artery disease.   - on home meds      4. Stage 3 chronic kidney disease.  - monitor renal function in house     5. Diabetes mellitus type 2 on insulin pump. - monitor sugars     6. Dyslipidemia. -statin       7.  Hypertension  - resume home meds          Chris Singh MD    5/1/2019 1:12 PM

## 2019-05-01 NOTE — ED PROVIDER NOTES
[address] [facility]  eMERGENCY dEPARTMENT eNCOUnter        Pt Name: Constanza Mayo  MRN: [id number]  [place] 1945  Date of evaluation: 5/1/2019  Provider: Veronica Panda  PCP: Harry Moon [id number]    This patient was seen and evaluated by the attending physician Dr. Yvonne Favre. CHIEF COMPLAINT       Chief Complaint   Patient presents with    Shortness of Breath     pt brought in via [facility] , x/o sob worsening this am. Pt has taken ATB recently for PNA. Pt sp02 upon ems arrival was in the 80's        HISTORY OF PRESENT ILLNESS   (Location/Symptom, Timing/Onset, Context/Setting, Quality, Duration, Modifying Factors, Severity)  Note limiting factors. Constanza Mayo is a 69 y.o. male with history of diabetes, pneumonia, COPD, coronary artery disease who presents to the emergency department complaining of productive cough with frothy sputum and occasional streaks of blood for one week. The patient reports chest tightness today but no pain. Denies documented fever but has had chills. Denies abdominal pain or distention, pain or swelling in extremities. He states that the edema in his legs have gone down significantly since being discharged from the hospital one week ago for multifocal pneumonia. He says that since being released from hospital, he has been on two liters of oxygen and taking steroids. However does not like the way steroids make him feel. Nursing Notes were all reviewed and agreed with or any disagreements were addressed  in the HPI. REVIEW OF SYSTEMS    (2-9 systems for level 4, 10 or more for level 5)     Review of Systems   Constitutional: Positive for chills and fatigue. Negative for fever. HENT: Positive for congestion. Eyes: Negative for visual disturbance. Respiratory: Positive for cough, chest tightness, shortness of breath and wheezing. Negative for stridor.     Cardiovascular: Negative for chest pain, palpitations and leg swelling. Gastrointestinal: Negative for abdominal distention, abdominal pain, nausea and vomiting. Endocrine: Negative. Genitourinary: Negative. Musculoskeletal: Negative for neck pain and neck stiffness. Back pain: chronic. Skin: Negative for color change, pallor, rash and wound. Allergic/Immunologic: Negative. Neurological: Negative for dizziness, tremors, seizures, syncope, facial asymmetry, speech difficulty, weakness, light-headedness, numbness and headaches. Hematological: Negative. Psychiatric/Behavioral: Negative for confusion. All other systems reviewed and are negative. Positives and Pertinent negatives as per HPI. Except as noted abovein the ROS, all other systems were reviewed and negative.        PAST MEDICAL HISTORY     Past Medical History:   Diagnosis Date    Arthritis     CAD (coronary artery disease)     Cancer ([address]     Chest pain     COPD (chronic obstructive pulmonary disease) ([address]     Diabetes mellitus (HCC)     pump patient    Drug use     Hx of crystal meth, cocaine per patient     Fibromyalgia     takes Lyrica for fibromyalgia and diabetic nerve pain    GERD (gastroesophageal reflux disease)     Heart attack ([address] 1991    1st heart attack (MI) 1991, 2nd heart attack (MI) pt does not recall    Hepatitis     Hep A    History of open heart surgery     [facility] [place] ([facility])    Hyperlipidemia     Hypertension     Peripheral vascular disease ([address]     Precancerous skin lesion     not active; pt sees skin specialist    Sleep apnea          SURGICAL HISTORY     Past Surgical History:   Procedure Laterality Date    ANKLE CLOSED REDUCTION      CARDIAC SURGERY      single vessel CABG in 1999    EYE SURGERY      cataracts         CURRENTMEDICATIONS       Previous Medications    ALPROSTADIL (EDEX) 40 MCG INJECTION    40 mcg by Intracavitary route as needed for Erectile Dysfunction use no more than 3 times per week AMLODIPINE (NORVASC) 10 MG TABLET    Take 1 tablet by mouth daily    ASPIRIN 81 MG TABLET    Take 81 mg by mouth daily    ATORVASTATIN (LIPITOR) 40 MG TABLET    Take 20 mg by mouth nightly    AZELASTINE  MCG/SPRAY SOLN    1 spray by Nasal route 2 times daily Indications: 1 spray each nare    BUDESONIDE-FORMOTEROL (SYMBICORT) 160-4.5 MCG/ACT AERO    Inhale 2 puffs into the lungs 2 times daily    CARVEDILOL (COREG) 12.5 MG TABLET    Take 1 tablet by mouth 2 times daily (with meals)    DICYCLOMINE (BENTYL) 20 MG TABLET    Take 20 mg by mouth every 6 hours    INSULIN PUMP ACCESSORIES MISC    by Does not apply route Basal  0669-9348= 1.9 units/hr  9884-8894=2.05 units/hr  7435-5175=1.4 units/hr  9557-7744=1.8 units/hr     Carb ratio 5.5  Sens 22:1  Goal 7004-7482= 100-120           6678-6894= 120-140    OMEPRAZOLE (PRILOSEC) 20 MG DELAYED RELEASE CAPSULE    Take 20 mg by mouth daily    PREDNISONE PO    Take 20 mg by mouth    TIOTROPIUM (SPIRIVA HANDIHALER) 18 MCG INHALATION CAPSULE    Inhale 1 capsule into the lungs daily    UMECLIDINIUM BROMIDE (INCRUSE ELLIPTA) 62.5 MCG/INH AEPB    Inhale 62.5 mcg into the lungs    VITAMIN D (CHOLECALCIFEROL) 1000 UNIT TABS TABLET    Take 1,000 Units by mouth daily         ALLERGIES     Gabapentin; Morphine; and Bupropion    FAMILYHISTORY       Family History   Adopted: Yes          SOCIAL HISTORY       Social History     Socioeconomic History    Marital status:      Spouse name: Not on file    Number of children: Not on file    Years of education: Not on file    Highest education level: Not on file   Occupational History    Occupation: retired     Comment: Heating/Cooling businessman   Social Needs    Financial resource strain: Not on file    Food insecurity:     Worry: Not on file     Inability: Not on file   Phraxis needs:     Medical: Not on file     Non-medical: Not on file   Tobacco Use    Smoking status: Former Smoker     Types: Cigarettes Last attempt to quit: 2007     Years since quittin.3    Smokeless tobacco: Never Used    Tobacco comment: 3.5 PPD 14 years ago   Substance and Sexual Activity    Alcohol use: No    Drug use: No    Sexual activity: Not Currently   Lifestyle    Physical activity:     Days per week: Not on file     Minutes per session: Not on file    Stress: Not on file   Relationships    Social connections:     Talks on phone: Not on file     Gets together: Not on file     Attends Religion service: Not on file     Active member of club or organization: Not on file     Attends meetings of clubs or organizations: Not on file     Relationship status: Not on file    Intimate partner violence:     Fear of current or ex partner: Not on file     Emotionally abused: Not on file     Physically abused: Not on file     Forced sexual activity: Not on file   Other Topics Concern    Not on file   Social History Narrative    Not on file       SCREENINGS    [place] Coma Scale  Eye Opening: Spontaneous  Best Verbal Response: Oriented  Best Motor Response: Obeys commands  [place] Coma Scale Score: 15        PHYSICAL EXAM    (up to 7 for level 4, 8 or more for level 5)     ED Triage Vitals [19 1052]   BP Temp Temp Source Pulse Resp SpO2 Height Weight   (!) 154/83 97.8 °F (36.6 °C) Oral 79 22 96 % 5' 9\" (1.753 m) 156 lb (70.8 kg)       Physical Exam   Constitutional: He is oriented to person, place, and time. He appears well-developed and well-nourished. No distress. HENT:   Head: Normocephalic and atraumatic. Right Ear: External ear normal.   Left Ear: External ear normal.   Nose: Nose normal.   Mouth/Throat: Oropharynx is clear and moist.   Eyes: Pupils are equal, round, and reactive to light. Conjunctivae and EOM are normal. Right eye exhibits no discharge. Left eye exhibits no discharge. No scleral icterus. Neck: Normal range of motion. No JVD present. Cardiovascular: Normal rate.    Pulmonary/Chest: Effort normal. [facility]  [address],  [place], [address]   Phone [phone]   BLOOD GAS, VENOUS - Abnormal; Notable for the following components:    pH, Mk 7.349 (*)     pO2, Mk 63.0 (*)     HCO3, Venous 22.6 (*)     Carboxyhemoglobin 3.7 (*)     All other components within normal limits    Narrative:     Performed at:  [facility] [address],  [place], [address] [place]   Phone [phone]   CULTURE BLOOD #2   CULTURE BLOOD #1   TROPONIN    Narrative:     Performed at:  [facility] [address],  [place], [address]   Phone [phone]   APTT    Narrative:     Performed at:  [facility] [address],  [place], [address]   Phone [phone]   PROTIME-INR    Narrative:     Performed at:  [facility] [address]  [place], [place]   Phone [phone]   LACTIC ACID, PLASMA       All other labs were within normal range or not returned as of this dictation. EKG: All EKG's are interpreted by the Emergency Department Physician who either signs orCo-signs this chart in the absence of a cardiologist.  Please see their note for interpretation of EKG. RADIOLOGY:   Non-plain film images such as CT, Ultrasound and MRI are read by the radiologist. Dub Distance radiographic images are visualized andpreliminarily interpreted by the  ED Provider with the below findings:        Interpretation perthe Radiologist below, if available at the time of this note:    CT Chest Pulmonary Embolism W Contrast   Final Result   No evidence of pulmonary embolism. New moderate to large bilateral pleural effusions with advanced adjacent   compressive atelectasis. Severe emphysema. New small areas of consolidation in the upper lobes suspicious for pneumonia. Suspicious left lower lobe pulmonary nodule.   Follow-up recommended with   tissue sampling, PET-CT, or chest CT in 3 months, as referenced below. Mediastinal adenopathy which may be reactive to the acute pulmonary process   but metastatic disease as a coexistent process not excluded and follow-up is   needed. Fleischner Society guidelines for follow-up and management of incidentally   detected pulmonary nodules:      Single Solid Nodule:      Nodule size greater than 8 mm         In a low-risk patient, consider CT at 3 months, PET/CT, or tissue sampling. In a high-risk patient, consider CT at 3 months, PET/CT, or tissue sampling.      - Low risk patients include individuals with minimal or absent history of   smoking and other known risk factors. - High risk patients include individuals with a history or smoking or known   risk factors. Radiology 2017 http://pubs. rsna.org/doi/full/10.1148/radiol. 4592787831         XR CHEST PORTABLE   Final Result   Cardiomegaly and worsening pulmonary edema compared to prior study. Findings   compatible with worsening CHF. Small bilateral pleural effusions. PROCEDURES   Unless otherwise noted below, none     Procedures    CRITICAL CARE TIME   Critical Care  There was a high probability of life-threatening clinical deterioration in the patient's condition requiring my urgent intervention. Total critical care time with the patient was 33 minutes excluding separately reportable procedures. Critical care required due to patients hypoxia due to pulmonary edema and pleural effusion prompting medical management, consultation and admission.        CONSULTS:  Dilip Burkett HOSPITALIST  Dr. Babita Mendenhall will admit ([id number])    [address] [place] [address] and DIFFERENTIALDIAGNOSIS/MDM:   Vitals:    Vitals:    05/01/19 1116 05/01/19 1145 05/01/19 1215 05/01/19 1245   BP:  (!) 132/59 (!) 149/61 (!) 142/78   Pulse:  75 75 77   Resp: 22 21 17 24   Temp:       TempSrc:       SpO2: 95% 92% 91% 93%   Weight:       Height:           Patient was given thefollowing medications:  Medications   nitroglycerin (NITRO-BID) 2 % ointment 1 inch (has no administration in time range)   furosemide (LASIX) injection 40 mg (has no administration in time range)   ipratropium-albuterol (DUONEB) nebulizer solution 1 ampule (1 ampule Inhalation Given 5/1/19 1115)   methylPREDNISolone sodium (SOLU-MEDROL) injection 125 mg (125 mg Intravenous Given 5/1/19 1250)   iopamidol (ISOVUE-370) 76 % injection 75 mL (75 mLs Intravenous Given 5/1/19 1224)       This patient presents complaining of cough, hemoptysis, shortness of breath. He is hypoxic upon arrival.  He is feeling a little bit better after receiving breathing treatment. Has trace edema in lower extremities but states that this is improved since recent discharge from the hospital one week ago. However, chest x-ray showing worsening pulmonary edema. CT scan performed to rule out PE and/or pneumonia. This shows pleural effusion and pulmonary edema. My suspicion is low for ACS, PE, myocarditis, pericarditis, endocarditis,  pericardial effusion, cardiac tamponade, thoracic aortic dissection, esophageal rupture, other life-threatening arrhythmia, hypertensive urgency or emergency, hemothorax, pulmonary contusion, subcutaneous emphysema, flail chest, pneumo mediastinum, rib fracture or other concerning pathology. We have addressed concerns and expectations. We ordered lasix and nitro paste and will admit for further management. FINAL IMPRESSION      1. Acute pulmonary edema (HCC)    2. Hypoxia    3. Pleural effusion          DISPOSITION/PLAN   DISPOSITION  Decision to admit      PATIENT REFERREDTO:  No follow-up provider specified.     DISCHARGE MEDICATIONS:  New Prescriptions    No medications on file       DISCONTINUED MEDICATIONS:  Discontinued Medications    No medications on file              (Please note that portions ofthis note were completed with a voice recognition program.  Efforts were made to edit the dictations but

## 2019-05-01 NOTE — ED NOTES
Bed: 08  Expected date:   Expected time:   Means of arrival:   Comments:  [facility]  05/01/19 1044

## 2019-05-01 NOTE — ED NOTES
Okay per Eleonora Felipe to discontinue repeat lactic.    Bedside report given to Ana Ugalde RN  05/01/19 8844

## 2019-05-01 NOTE — ED NOTES
Pt back from CT. Pt respirations are even and easy. Pt on hi flow 13L and breathing much better. Pt is on a continuous pulse oximetry and telemetry monitoring. Pt continued on cycling blood pressure. Fall risk precautions in place, call light in reach, bed side table within reach, bed alarm on, will continue to monitor.          Jayant Burrows RN  05/01/19 [facility]  05/01/19 0887

## 2019-05-01 NOTE — ED NOTES
Attempted to ween pt of non-breather to 6L NC, pt sp02 85%. Placed non breather back on pt and called Ally Willis RT. He came to bedside, gave ordered treatment. Pt placed on continuous pulse oximetry and telemetry monitoring. Pt placed on cycling blood pressure. Fall risk precautions in place, call light in reach, bed side table within reach, bed alarm on, will continue to monitor.        Price Calix RN  05/01/19 7595

## 2019-05-01 NOTE — ED NOTES
Upon arrival pt sp02 was 69 % on RA. Pt had labored breathing, and speech winded. Placed on nonb-breather at 15 L of 02. EMS reports pt was in 80's upon arrival and came up to 96 % . Pt is poor historian of medications, pt did bring some medications with him. Pt placed on continuous pulse oximetry and telemetry monitoring. Pt placed on cycling blood pressure. Fall risk precautions in place, call light in reach, bed side table within reach, bed alarm on, will continue to monitor.        Rufus Malcolm RN  05/01/19 [address]  05/01/19 4410

## 2019-05-02 LAB
ANION GAP SERPL CALCULATED.3IONS-SCNC: 13 MMOL/L (ref 3–16)
BASOPHILS ABSOLUTE: 0 K/UL (ref 0–0.2)
BASOPHILS RELATIVE PERCENT: 0.1 %
BILIRUBIN URINE: NEGATIVE
BLOOD, URINE: NEGATIVE
BUN BLDV-MCNC: 45 MG/DL (ref 7–20)
CALCIUM SERPL-MCNC: 8.6 MG/DL (ref 8.3–10.6)
CHLORIDE BLD-SCNC: 92 MMOL/L (ref 99–110)
CLARITY: CLEAR
CO2: 22 MMOL/L (ref 21–32)
COLOR: YELLOW
CREAT SERPL-MCNC: 1.6 MG/DL (ref 0.8–1.3)
CREATININE URINE: 61.3 MG/DL (ref 39–259)
EOSINOPHILS ABSOLUTE: 0 K/UL (ref 0–0.6)
EOSINOPHILS RELATIVE PERCENT: 0 %
GFR AFRICAN AMERICAN: 52
GFR NON-AFRICAN AMERICAN: 43
GLUCOSE BLD-MCNC: 332 MG/DL (ref 70–99)
GLUCOSE BLD-MCNC: 406 MG/DL (ref 70–99)
GLUCOSE BLD-MCNC: 418 MG/DL (ref 70–99)
GLUCOSE BLD-MCNC: 524 MG/DL (ref 70–99)
GLUCOSE BLD-MCNC: 560 MG/DL (ref 70–99)
GLUCOSE BLD-MCNC: 637 MG/DL (ref 70–99)
GLUCOSE BLD-MCNC: >600 MG/DL (ref 70–99)
GLUCOSE URINE: >=1000 MG/DL
HCT VFR BLD CALC: 31.7 % (ref 40.5–52.5)
HEMOGLOBIN: 10.4 G/DL (ref 13.5–17.5)
KETONES, URINE: NEGATIVE MG/DL
LEUKOCYTE ESTERASE, URINE: NEGATIVE
LV EF: 35 %
LVEF MODALITY: NORMAL
LYMPHOCYTES ABSOLUTE: 0.5 K/UL (ref 1–5.1)
LYMPHOCYTES RELATIVE PERCENT: 9.4 %
MCH RBC QN AUTO: 32.5 PG (ref 26–34)
MCHC RBC AUTO-ENTMCNC: 32.8 G/DL (ref 31–36)
MCV RBC AUTO: 99.1 FL (ref 80–100)
MICROSCOPIC EXAMINATION: ABNORMAL
MONOCYTES ABSOLUTE: 0.1 K/UL (ref 0–1.3)
MONOCYTES RELATIVE PERCENT: 2 %
NEUTROPHILS ABSOLUTE: 4.7 K/UL (ref 1.7–7.7)
NEUTROPHILS RELATIVE PERCENT: 88.5 %
NITRITE, URINE: NEGATIVE
PDW BLD-RTO: 13.9 % (ref 12.4–15.4)
PERFORMED ON: ABNORMAL
PH UA: 5.5 (ref 5–8)
PLATELET # BLD: 92 K/UL (ref 135–450)
PLATELET SLIDE REVIEW: ABNORMAL
PMV BLD AUTO: 10 FL (ref 5–10.5)
POTASSIUM REFLEX MAGNESIUM: 4.9 MMOL/L (ref 3.5–5.1)
PROTEIN UA: NEGATIVE MG/DL
RBC # BLD: 3.2 M/UL (ref 4.2–5.9)
REPORT: NORMAL
RESPIRATORY PANEL PCR: NORMAL
SLIDE REVIEW: ABNORMAL
SODIUM BLD-SCNC: 127 MMOL/L (ref 136–145)
SODIUM URINE: 32 MMOL/L
SPECIFIC GRAVITY UA: 1.02 (ref 1–1.03)
TSH REFLEX: 0.73 UIU/ML (ref 0.27–4.2)
URINE TYPE: ABNORMAL
UROBILINOGEN, URINE: 0.2 E.U./DL
WBC # BLD: 5.3 K/UL (ref 4–11)

## 2019-05-02 PROCEDURE — 6370000000 HC RX 637 (ALT 250 FOR IP): Performed by: HOSPITALIST

## 2019-05-02 PROCEDURE — 87449 NOS EACH ORGANISM AG IA: CPT

## 2019-05-02 PROCEDURE — 87633 RESP VIRUS 12-25 TARGETS: CPT

## 2019-05-02 PROCEDURE — 2580000003 HC RX 258: Performed by: HOSPITALIST

## 2019-05-02 PROCEDURE — 94760 N-INVAS EAR/PLS OXIMETRY 1: CPT

## 2019-05-02 PROCEDURE — 83935 ASSAY OF URINE OSMOLALITY: CPT

## 2019-05-02 PROCEDURE — 81003 URINALYSIS AUTO W/O SCOPE: CPT

## 2019-05-02 PROCEDURE — 2580000003 HC RX 258

## 2019-05-02 PROCEDURE — 93306 TTE W/DOPPLER COMPLETE: CPT

## 2019-05-02 PROCEDURE — 36415 COLL VENOUS BLD VENIPUNCTURE: CPT

## 2019-05-02 PROCEDURE — 2580000003 HC RX 258: Performed by: NURSE PRACTITIONER

## 2019-05-02 PROCEDURE — 6360000002 HC RX W HCPCS: Performed by: HOSPITALIST

## 2019-05-02 PROCEDURE — 80048 BASIC METABOLIC PNL TOTAL CA: CPT

## 2019-05-02 PROCEDURE — 87581 M.PNEUMON DNA AMP PROBE: CPT

## 2019-05-02 PROCEDURE — 84443 ASSAY THYROID STIM HORMONE: CPT

## 2019-05-02 PROCEDURE — 87798 DETECT AGENT NOS DNA AMP: CPT

## 2019-05-02 PROCEDURE — 6370000000 HC RX 637 (ALT 250 FOR IP): Performed by: INTERNAL MEDICINE

## 2019-05-02 PROCEDURE — 2060000000 HC ICU INTERMEDIATE R&B

## 2019-05-02 PROCEDURE — 2700000000 HC OXYGEN THERAPY PER DAY

## 2019-05-02 PROCEDURE — 94640 AIRWAY INHALATION TREATMENT: CPT

## 2019-05-02 PROCEDURE — 82570 ASSAY OF URINE CREATININE: CPT

## 2019-05-02 PROCEDURE — 87486 CHLMYD PNEUM DNA AMP PROBE: CPT

## 2019-05-02 PROCEDURE — 85025 COMPLETE CBC W/AUTO DIFF WBC: CPT

## 2019-05-02 PROCEDURE — 99223 1ST HOSP IP/OBS HIGH 75: CPT | Performed by: INTERNAL MEDICINE

## 2019-05-02 PROCEDURE — 84300 ASSAY OF URINE SODIUM: CPT

## 2019-05-02 PROCEDURE — 6360000002 HC RX W HCPCS

## 2019-05-02 RX ORDER — FUROSEMIDE 40 MG/1
40 TABLET ORAL ONCE
Status: COMPLETED | OUTPATIENT
Start: 2019-05-02 | End: 2019-05-02

## 2019-05-02 RX ORDER — FUROSEMIDE 10 MG/ML
20 INJECTION INTRAMUSCULAR; INTRAVENOUS 2 TIMES DAILY
Status: COMPLETED | OUTPATIENT
Start: 2019-05-03 | End: 2019-05-04

## 2019-05-02 RX ORDER — IPRATROPIUM BROMIDE AND ALBUTEROL SULFATE 2.5; .5 MG/3ML; MG/3ML
1 SOLUTION RESPIRATORY (INHALATION) EVERY 4 HOURS PRN
Status: DISCONTINUED | OUTPATIENT
Start: 2019-05-02 | End: 2019-05-05 | Stop reason: HOSPADM

## 2019-05-02 RX ORDER — SODIUM CHLORIDE 9 MG/ML
INJECTION, SOLUTION INTRAVENOUS
Status: DISPENSED
Start: 2019-05-02 | End: 2019-05-02

## 2019-05-02 RX ORDER — SODIUM CHLORIDE 9 MG/ML
INJECTION, SOLUTION INTRAVENOUS CONTINUOUS
Status: DISCONTINUED | OUTPATIENT
Start: 2019-05-02 | End: 2019-05-02

## 2019-05-02 RX ADMIN — Medication 10 ML: at 21:01

## 2019-05-02 RX ADMIN — DICYCLOMINE HYDROCHLORIDE 20 MG: 10 CAPSULE ORAL at 12:48

## 2019-05-02 RX ADMIN — DICYCLOMINE HYDROCHLORIDE 20 MG: 10 CAPSULE ORAL at 16:39

## 2019-05-02 RX ADMIN — CARVEDILOL 12.5 MG: 6.25 TABLET, FILM COATED ORAL at 08:52

## 2019-05-02 RX ADMIN — Medication 10 ML: at 08:54

## 2019-05-02 RX ADMIN — CEFEPIME HYDROCHLORIDE 2 G: 2 INJECTION, POWDER, FOR SOLUTION INTRAVENOUS at 15:38

## 2019-05-02 RX ADMIN — ENOXAPARIN SODIUM 40 MG: 40 INJECTION SUBCUTANEOUS at 21:01

## 2019-05-02 RX ADMIN — ATORVASTATIN CALCIUM 20 MG: 20 TABLET, FILM COATED ORAL at 21:01

## 2019-05-02 RX ADMIN — TIOTROPIUM BROMIDE INHALATION SPRAY 2 PUFF: 3.12 SPRAY, METERED RESPIRATORY (INHALATION) at 09:40

## 2019-05-02 RX ADMIN — FUROSEMIDE 40 MG: 40 TABLET ORAL at 16:39

## 2019-05-02 RX ADMIN — Medication 2 PUFF: at 09:06

## 2019-05-02 RX ADMIN — CARVEDILOL 12.5 MG: 6.25 TABLET, FILM COATED ORAL at 16:38

## 2019-05-02 RX ADMIN — DICYCLOMINE HYDROCHLORIDE 20 MG: 10 CAPSULE ORAL at 07:12

## 2019-05-02 RX ADMIN — PANTOPRAZOLE SODIUM 40 MG: 40 TABLET, DELAYED RELEASE ORAL at 07:31

## 2019-05-02 RX ADMIN — SODIUM CHLORIDE: 9 INJECTION, SOLUTION INTRAVENOUS at 03:09

## 2019-05-02 RX ADMIN — IPRATROPIUM BROMIDE AND ALBUTEROL SULFATE 1 AMPULE: .5; 3 SOLUTION RESPIRATORY (INHALATION) at 09:05

## 2019-05-02 RX ADMIN — ASPIRIN 81 MG: 81 TABLET, COATED ORAL at 08:52

## 2019-05-02 ASSESSMENT — PAIN SCALES - GENERAL
PAINLEVEL_OUTOF10: 0

## 2019-05-02 NOTE — PROGRESS NOTES
[address] [facility] PROGRESS NOTE    5/2/2019 11:02 AM        Name: Nanda Woods . Admitted: 5/1/2019  Primary Care Provider: Harry Moon [id number] (Tel: None)      Subjective:  . Patient is sitting in bed  He states that he didn't take his inhalers at home because they felt that it was too many of them. When I asked him why he came to the hospital he said because he felt short of breath, and when I asked him if he tried any of the inhalers he said no. He said that he didn't take any prednisone at home when he doesn't want any steroid.         Reviewed interval ancillary notes    Current Medications    0.9 % sodium chloride infusion Continuous   sodium chloride 0.9 % infusion    cefepime (MAXIPIME) 2 g IVPB minibag Q12H   ipratropium-albuterol (DUONEB) nebulizer solution 1 ampule Q4H PRN   tiotropium (SPIRIVA RESPIMAT) 2.5 MCG/ACT inhaler 2 puff Daily   aspirin EC tablet 81 mg Daily   atorvastatin (LIPITOR) tablet 20 mg Nightly   azelastine (ASTELIN) 0.1 % nasal spray 1 spray BID   mometasone-formoterol (DULERA) 200-5 MCG/ACT inhaler 2 puff BID   carvedilol (COREG) tablet 12.5 mg BID WC   dicyclomine (BENTYL) capsule 20 mg Q6H   Insulin Pump PATIENT SUPPLIED Continuous   pantoprazole (PROTONIX) tablet 40 mg QAM AC   acetaminophen (TYLENOL) tablet 650 mg Q4H PRN   sodium chloride flush 0.9 % injection 10 mL 2 times per day   sodium chloride flush 0.9 % injection 10 mL PRN   magnesium hydroxide (MILK OF MAGNESIA) 400 MG/5ML suspension 30 mL Daily PRN   ondansetron (ZOFRAN) injection 4 mg Q6H PRN   enoxaparin (LOVENOX) injection 40 mg Nightly   glucose (GLUTOSE) 40 % oral gel 15 g PRN   dextrose 50 % solution 12.5 g PRN   glucagon (rDNA) injection 1 mg PRN   dextrose 5 % solution PRN   guaiFENesin-dextromethorphan (ROBITUSSIN DM) 100-10 MG/5ML syrup 5 mL Q4H PRN       Objective:  BP (!) 142/67   Pulse 62   Temp 97.9 °F (36.6 °C) (Oral)   Resp 20   Ht 5' 9\" (1.753 m)   Wt 169 lb 3.2 oz (76.7 kg)   SpO2 96%   BMI 24.99 kg/m²     Intake/Output Summary (Last 24 hours) at 5/2/2019 1102  Last data filed at 5/2/2019 0737  Gross per 24 hour   Intake 1026.03 ml   Output 2800 ml   Net -1773.97 ml    Wt Readings from Last 3 Encounters:   05/02/19 169 lb 3.2 oz (76.7 kg)   04/26/19 161 lb 1.6 oz (73.1 kg)   03/18/19 171 lb 12.8 oz (77.9 kg)       General appearance:  Appears comfortable  Eyes: Sclera clear. Pupils equal.  ENT: Moist oral mucosa. Trachea midline, no adenopathy. Cardiovascular: Regular rhythm, normal S1, S2. No murmur. No edema in lower extremities  Respiratory: Not using accessory muscles. Good inspiratory effort. Clear to auscultation bilaterally, no wheeze or crackles. GI: Abdomen soft, no tenderness, not distended, normal bowel sounds  Musculoskeletal: No cyanosis in digits, neck supple  Neurology: CN 2-12 grossly intact. No speech or motor deficits  Psych: Normal affect. Alert and oriented in time, place and person  Skin: Warm, dry, normal turgor    Labs and Tests:  CBC:   Recent Labs     05/01/19  1125 05/02/19  0200   WBC 13.1* 5.3   HGB 12.0* 10.4*   * 92*     BMP:  Recent Labs     05/01/19  1125 05/02/19  0200    127*   K 4.6 4.9    92*   CO2 23 22   BUN 35* 45*   CREATININE 1.3 1.6*   GLUCOSE 216* 637*     Hepatic: Recent Labs     05/01/19  1125   AST 30   ALT 37   BILITOT 1.0   ALKPHOS 77       Problem List  Active Problems:    Acute respiratory failure (HCC)  Resolved Problems:    * No resolved hospital problems. *       Assessment & Plan:   Acute respiratory failure with hypoxia, multifactorial, Pneumonia + pulmonary edema + undiagnosed COPD.  - Patient is noncompliant with recommendations from his doctor's at the [place] especially regarding the inhalers that he use.   - Blood cultures are with no growth to date, pro-calcitonin is mildly elevated, it seems that he did not take his steroids, so

## 2019-05-02 NOTE — PROGRESS NOTES
FSBS reading \"HI. \" Discussed with pt that FSBS must be >600. Pt ok to administer 17.8 units per home insulin pump. Will recheck FSBS in 1 hour. When insulin pump site checked, pump tubing noted to be disconnected from site. Pt reattaches tubing to site. Will continue to monitor.

## 2019-05-02 NOTE — PROGRESS NOTES
NP calls back and orders NS 0.9% at 75ml/hr for Na+ 127. To limit free water intake. Pt v/u. Cefepime dose to be diluted in D5 but FSBS remains >600. Called Rx but unable to change soln. NP paged.

## 2019-05-02 NOTE — PROGRESS NOTES
Dr. Ruba Pittman at bedside. Discussed hyperglycemia with MD. LEACH 418 at this time. Dr. Ruba Pittman encourages pt to use PO steroids and inhalers.

## 2019-05-02 NOTE — PROGRESS NOTES
Reassessment complete. Pt is A/Ox4. Tolerating 10L HFNC. O2 decreased to 8L HFNC. SpO2 93%. No c/o SOB. Up ad yamilet. No c/o pain.

## 2019-05-02 NOTE — PROGRESS NOTES
Repeat FSBS still reading \"Hi. \" BMP ordered to determine FSBS. BMP and AM labs drawn and sent to lab. This RN requests that pt does not bolus himself with any more insulin until FSBS determined per lab; pt v/u.

## 2019-05-02 NOTE — CONSULTS
REASON FOR CONSULTATION/CC: hypoxia      Consult at request of Carrol Andrade MD     PCP: [address]    Chief Complaint   Patient presents with    Shortness of Breath     pt brought in via [facility] , x/o sob worsening this am. Pt has taken ATB recently for PNA. Pt sp02 upon ems arrival was in the 80's        HISTORY OF PRESENT ILLNESS: Leydi Whyte is a 69y.o. year old male with a history of COPD with chronic hypoxia, and recent admission for PNA and has completed abx therapy who presents with with acute on chronic hypoxia, increased dyspnea, and reported o2 sats in the 80%'s. Chest imaging w/o PE but does show extensive pulmonary edema, pleural effusions, possible lung nodule and likely radiographic evidence of recent pna. He has had no fevers but does think he has felt cold. He has scant sputum production with chronic cough. No malaise. WBC is actually normal today, AFVSS. BNP elevated so Echo has been ordered.       Past Medical History:   Diagnosis Date    Arthritis     CAD (coronary artery disease)     Cancer ([address]     Chest pain     COPD (chronic obstructive pulmonary disease) (HCC)     Diabetes mellitus (HCC)     pump patient    Drug use     Hx of crystal meth, cocaine per patient     Emphysema lung ([address]     Fibromyalgia     takes Lyrica for fibromyalgia and diabetic nerve pain    GERD (gastroesophageal reflux disease)     Heart attack ([address] 1991    1st heart attack (MI) 1991, 2nd heart attack (MI) pt does not recall    Hepatitis     Hep A    History of open heart surgery     Triple Vessel Disease at [place] ([address])    Hyperlipidemia     Hypertension     Peripheral vascular disease ([address]     Precancerous skin lesion     not active; pt sees skin specialist    Sleep apnea          Past Surgical History:   Procedure Laterality Date    ANKLE CLOSED REDUCTION      CARDIAC SURGERY      single vessel CABG in [address] [facility] normal.  Neck: Trachea midline. No mass   Resp:  Scattered crackles. No wheezes. No rhonchi. No dullness on percussion. CV: Regular rate. Regular rhythm. No murmur or rub. 1+ b/l pitting edema. GI: Soft, Non-tender. Non-distended. +BS  Skin: Warm, dry, w/o erythema. Lymph: No cervical or supraclavicular LAD. M/S: No cyanosis. No clubbing. Neuro:  CN 2-12 tested, no focal neurologic deficit. Moves all extremities  Psych: Awake and alert, Oriented x 3. Judgement and insight appropriate. Mood stable. Data Reviewed:   LABS:  CBC:   Recent Labs     05/01/19  1125 05/02/19  0200   WBC 13.1* 5.3   HGB 12.0* 10.4*   HCT 36.1* 31.7*   MCV 99.0 99.1   * 92*     BMP:   Recent Labs     05/01/19  1125 05/02/19  0200    127*   K 4.6 4.9    92*   CO2 23 22   BUN 35* 45*   CREATININE 1.3 1.6*     LIVER PROFILE:   Recent Labs     05/01/19  1125   AST 30   ALT 37   BILITOT 1.0   ALKPHOS 77     PT/INR:   Recent Labs     05/01/19  1125   PROTIME 12.9   INR 1.13     APTT:   Recent Labs     05/01/19  1125   APTT 29.8     UA:No results for input(s): NITRITE, COLORU, PHUR, LABCAST, WBCUA, RBCUA, MUCUS, TRICHOMONAS, YEAST, BACTERIA, CLARITYU, SPECGRAV, LEUKOCYTESUR, UROBILINOGEN, BILIRUBINUR, BLOODU, GLUCOSEU, AMORPHOUS in the last 72 hours. Invalid input(s): KETONESU  No results for input(s): PHART, DKB6SCI, PO2ART in the last 72 hours. Radiology Review:  Pertinent images / reports were reviewed as a part of this visit. CT Chest w/ contrast: No results found for this or any previous visit.     CT Chest w/o contrast:   Results for orders placed during the hospital encounter of 03/08/19   CT CHEST WO CONTRAST    Narrative EXAMINATION:  CT OF THE ABDOMEN AND PELVIS WITHOUT CONTRAST; CT OF THE CHEST WITHOUT  CONTRAST 3/8/2019 9:36 pm    TECHNIQUE:  CT of the abdomen and pelvis was performed without the administration of  intravenous contrast. Multiplanar reformatted images are provided for No adenopathy, mesenteric stranding or free  fluid. Aortic caliber is normal.    Bones/Soft Tissues: No acute findings. Impression 1. No evidence for renal stone disease and no definite mass identified. Contrast-enhanced study would be more sensitive for evaluation of a possible  mass. 2. Bilateral perinephric stranding, nonspecific. 3. Cholelithiasis without CT evidence for acute cholecystitis. 4. Splenomegaly. 5. Left lower lobe pulmonary nodule. Recommend follow-up: In a low-risk  patient, CT at 6-12 months, then consider CT at 18-24 months. In a high-risk  patient, CT at 6-12 months, then CT at 18-24 months. 6. Emphysema. CTPA:   Results for orders placed during the hospital encounter of 05/01/19   CT Chest Pulmonary Embolism W Contrast    Narrative EXAMINATION:  CTA OF THE CHEST 5/1/2019 12:24 pm    TECHNIQUE:  CTA of the chest was performed after the administration of intravenous  contrast.  Multiplanar reformatted images are provided for review. MIP  images are provided for review. Dose modulation, iterative reconstruction,  and/or weight based adjustment of the mA/kV was utilized to reduce the  radiation dose to as low as reasonably achievable. COMPARISON:  CT of the chest March 8, 2019    HISTORY:  ORDERING SYSTEM PROVIDED HISTORY: cough/sob  TECHNOLOGIST PROVIDED HISTORY:  Ordering Physician Provided Reason for Exam: cough/sob  Acuity: Unknown  Type of Exam: Unknown    FINDINGS:  Pulmonary Arteries: Pulmonary arteries are adequately opacified for  evaluation. No pulmonary embolism demonstrated. Main pulmonary artery is  normal in caliber. Mediastinum: The heart and pericardium demonstrate no acute abnormality. There is no acute abnormality of the thoracic aorta. Numerous enlarged lymph  nodes are present, increased from the comparison    Lungs/pleura: New moderate to large sized bilateral pleural effusions. Adjacent compressive atelectasis. Severe emphysema again noted. No pleural  effusion. There is a new focal area of consolidation in the right upper lobe  at the apex. New similar pleural-based area of consolidation in the lateral  left upper lobe. Pulmonary nodule in the left lung base redemonstrated this  is better visualized on this examination, measuring 11 mm by 9 mm. Margins  appear ill-defined although poorly evaluated due to motion. Upper Abdomen: No acute findings. Cholelithiasis is noted incidentally. Soft Tissues/Bones: No acute bone or soft tissue abnormality. Impression No evidence of pulmonary embolism. New moderate to large bilateral pleural effusions with advanced adjacent  compressive atelectasis. Severe emphysema. New small areas of consolidation in the upper lobes suspicious for pneumonia. Suspicious left lower lobe pulmonary nodule. Follow-up recommended with  tissue sampling, PET-CT, or chest CT in 3 months, as referenced below. Mediastinal adenopathy which may be reactive to the acute pulmonary process  but metastatic disease as a coexistent process not excluded and follow-up is  needed. Fleischner Society guidelines for follow-up and management of incidentally  detected pulmonary nodules:    Single Solid Nodule:    Nodule size greater than 8 mm      In a low-risk patient, consider CT at 3 months, PET/CT, or tissue sampling. In a high-risk patient, consider CT at 3 months, PET/CT, or tissue sampling.    - Low risk patients include individuals with minimal or absent history of  smoking and other known risk factors. - High risk patients include individuals with a history or smoking or known  risk factors. Radiology 2017 http://pubs. rsna.org/doi/full/10.1148/radiol. 9304186631         CXR PA/LAT: No results found for this or any previous visit.     CXR portable:   Results for orders placed during the hospital encounter of 05/01/19   XR CHEST PORTABLE    Narrative EXAMINATION:  SINGLE XRAY VIEW OF THE CHEST    5/1/2019 11:01 am    COMPARISON:  04/20/2019    HISTORY:  ORDERING SYSTEM PROVIDED HISTORY: SOB  TECHNOLOGIST PROVIDED HISTORY:  Reason for exam:->SOB  Ordering Physician Provided Reason for Exam: Shortness of Breath (pt brought  in via [facility] , x/o sob worsening this am. Pt has taken ATB recently  for PNA. Pt sp02 upon ems arrival was in the 80's )  Acuity: Unknown  Type of Exam: Unknown    FINDINGS:  Heart size is enlarged, but stable. Status post median sternotomy and CABG. Diffuse increased interstitial opacities, worsened from prior study. Small  bilateral pleural effusions. No pneumothorax. Surgical clips project over  the right scapula. Impression Cardiomegaly and worsening pulmonary edema compared to prior study. Findings  compatible with worsening CHF. Small bilateral pleural effusions. Pulmonary function testing        Assessment:       Acute Hypoxemic Respiratory Failure with SAO2 <90% on Room Air  Volume overload  Hospital Acquired Pneumonia  Hyperglycemia  Severe Panlobular emphysema  COPD  Lactic Acidosis  Pulmonary Nodule, LLL    Plan:      -CT does show new small patches of consolidation with mildly elevated Procal, given high o2 requirement recommend treating emprically for HAP. -swallow eval in March of this year unremarkable  -Echo pending given volume overload state on exam and elevated BNP, may explain hypoxia as well. CT most c/w pulmonary edema.  -Agree with continued aggressive diuresis  -Procal is mildly elevated  -resp cx and resp viral panel pending  -outpatient CT in 3 months, if nodule and mediastinal LAD persists may need EBUS.   -dulera, duonebs PRN, spiriva  -pt has been discharged on o2 before up to 4lpm, unclear how far off baselinehe currently is    Thank you for the consult    [address] Pulmonary, Sleep and [address]

## 2019-05-02 NOTE — PROGRESS NOTES
FSBS per lab is 637. Other labs values do not suggest DKA. Pt boluses self with 12.2 units per home insulin pump. Pt c/o blurry vision and polydipsia. Na+ 127. Perfect Serve sent to MD, awaiting new orders.

## 2019-05-03 LAB
ALBUMIN SERPL-MCNC: 3.3 G/DL (ref 3.4–5)
ANION GAP SERPL CALCULATED.3IONS-SCNC: 10 MMOL/L (ref 3–16)
BASOPHILS ABSOLUTE: 0 K/UL (ref 0–0.2)
BASOPHILS RELATIVE PERCENT: 0.3 %
BUN BLDV-MCNC: 38 MG/DL (ref 7–20)
CALCIUM SERPL-MCNC: 9.5 MG/DL (ref 8.3–10.6)
CHLORIDE BLD-SCNC: 104 MMOL/L (ref 99–110)
CO2: 26 MMOL/L (ref 21–32)
CREAT SERPL-MCNC: 1.4 MG/DL (ref 0.8–1.3)
EOSINOPHILS ABSOLUTE: 0.2 K/UL (ref 0–0.6)
EOSINOPHILS RELATIVE PERCENT: 1.6 %
GFR AFRICAN AMERICAN: >60
GFR NON-AFRICAN AMERICAN: 50
GLUCOSE BLD-MCNC: 149 MG/DL (ref 70–99)
GLUCOSE BLD-MCNC: 151 MG/DL (ref 70–99)
GLUCOSE BLD-MCNC: 161 MG/DL (ref 70–99)
GLUCOSE BLD-MCNC: 164 MG/DL (ref 70–99)
GLUCOSE BLD-MCNC: 240 MG/DL (ref 70–99)
GLUCOSE BLD-MCNC: 51 MG/DL (ref 70–99)
HCT VFR BLD CALC: 31 % (ref 40.5–52.5)
HEMOGLOBIN: 10.6 G/DL (ref 13.5–17.5)
L. PNEUMOPHILA SEROGP 1 UR AG: NORMAL
LYMPHOCYTES ABSOLUTE: 2 K/UL (ref 1–5.1)
LYMPHOCYTES RELATIVE PERCENT: 20.4 %
MCH RBC QN AUTO: 32.8 PG (ref 26–34)
MCHC RBC AUTO-ENTMCNC: 34.3 G/DL (ref 31–36)
MCV RBC AUTO: 95.6 FL (ref 80–100)
MONOCYTES ABSOLUTE: 0.4 K/UL (ref 0–1.3)
MONOCYTES RELATIVE PERCENT: 4.4 %
NEUTROPHILS ABSOLUTE: 7.3 K/UL (ref 1.7–7.7)
NEUTROPHILS RELATIVE PERCENT: 73.3 %
OSMOLALITY URINE: 524 MOSM/KG (ref 390–1070)
PDW BLD-RTO: 14.1 % (ref 12.4–15.4)
PERFORMED ON: ABNORMAL
PHOSPHORUS: 4 MG/DL (ref 2.5–4.9)
PLATELET # BLD: 120 K/UL (ref 135–450)
PMV BLD AUTO: 10.3 FL (ref 5–10.5)
POTASSIUM SERPL-SCNC: 3.8 MMOL/L (ref 3.5–5.1)
RBC # BLD: 3.24 M/UL (ref 4.2–5.9)
SODIUM BLD-SCNC: 140 MMOL/L (ref 136–145)
WBC # BLD: 10 K/UL (ref 4–11)

## 2019-05-03 PROCEDURE — 6360000002 HC RX W HCPCS: Performed by: INTERNAL MEDICINE

## 2019-05-03 PROCEDURE — 94760 N-INVAS EAR/PLS OXIMETRY 1: CPT

## 2019-05-03 PROCEDURE — 99223 1ST HOSP IP/OBS HIGH 75: CPT | Performed by: INTERNAL MEDICINE

## 2019-05-03 PROCEDURE — 94640 AIRWAY INHALATION TREATMENT: CPT

## 2019-05-03 PROCEDURE — 6360000002 HC RX W HCPCS: Performed by: HOSPITALIST

## 2019-05-03 PROCEDURE — 80069 RENAL FUNCTION PANEL: CPT

## 2019-05-03 PROCEDURE — 6370000000 HC RX 637 (ALT 250 FOR IP): Performed by: INTERNAL MEDICINE

## 2019-05-03 PROCEDURE — 85025 COMPLETE CBC W/AUTO DIFF WBC: CPT

## 2019-05-03 PROCEDURE — 36415 COLL VENOUS BLD VENIPUNCTURE: CPT

## 2019-05-03 PROCEDURE — 2580000003 HC RX 258

## 2019-05-03 PROCEDURE — 6370000000 HC RX 637 (ALT 250 FOR IP): Performed by: HOSPITALIST

## 2019-05-03 PROCEDURE — 6360000002 HC RX W HCPCS

## 2019-05-03 PROCEDURE — 2060000000 HC ICU INTERMEDIATE R&B

## 2019-05-03 PROCEDURE — 2580000003 HC RX 258: Performed by: HOSPITALIST

## 2019-05-03 RX ORDER — LOSARTAN POTASSIUM 25 MG/1
50 TABLET ORAL DAILY
Status: DISCONTINUED | OUTPATIENT
Start: 2019-05-03 | End: 2019-05-05 | Stop reason: HOSPADM

## 2019-05-03 RX ORDER — BUDESONIDE AND FORMOTEROL FUMARATE DIHYDRATE 160; 4.5 UG/1; UG/1
2 AEROSOL RESPIRATORY (INHALATION) 2 TIMES DAILY
Status: DISCONTINUED | OUTPATIENT
Start: 2019-05-03 | End: 2019-05-05 | Stop reason: HOSPADM

## 2019-05-03 RX ORDER — ALBUTEROL SULFATE 90 UG/1
2 AEROSOL, METERED RESPIRATORY (INHALATION) EVERY 6 HOURS PRN
Status: DISCONTINUED | OUTPATIENT
Start: 2019-05-03 | End: 2019-05-05 | Stop reason: HOSPADM

## 2019-05-03 RX ADMIN — DICYCLOMINE HYDROCHLORIDE 20 MG: 10 CAPSULE ORAL at 13:19

## 2019-05-03 RX ADMIN — CEFEPIME HYDROCHLORIDE 2 G: 2 INJECTION, POWDER, FOR SOLUTION INTRAVENOUS at 15:32

## 2019-05-03 RX ADMIN — CARVEDILOL 12.5 MG: 6.25 TABLET, FILM COATED ORAL at 09:36

## 2019-05-03 RX ADMIN — DICYCLOMINE HYDROCHLORIDE 20 MG: 10 CAPSULE ORAL at 22:06

## 2019-05-03 RX ADMIN — BUDESONIDE AND FORMOTEROL FUMARATE DIHYDRATE 2 PUFF: 160; 4.5 AEROSOL RESPIRATORY (INHALATION) at 19:49

## 2019-05-03 RX ADMIN — DICYCLOMINE HYDROCHLORIDE 20 MG: 10 CAPSULE ORAL at 00:04

## 2019-05-03 RX ADMIN — DICYCLOMINE HYDROCHLORIDE 20 MG: 10 CAPSULE ORAL at 06:31

## 2019-05-03 RX ADMIN — FUROSEMIDE 20 MG: 10 INJECTION, SOLUTION INTRAMUSCULAR; INTRAVENOUS at 09:37

## 2019-05-03 RX ADMIN — ATORVASTATIN CALCIUM 20 MG: 20 TABLET, FILM COATED ORAL at 22:06

## 2019-05-03 RX ADMIN — PANTOPRAZOLE SODIUM 40 MG: 40 TABLET, DELAYED RELEASE ORAL at 06:31

## 2019-05-03 RX ADMIN — CEFEPIME HYDROCHLORIDE 2 G: 2 INJECTION, POWDER, FOR SOLUTION INTRAVENOUS at 03:32

## 2019-05-03 RX ADMIN — ENOXAPARIN SODIUM 40 MG: 40 INJECTION SUBCUTANEOUS at 22:07

## 2019-05-03 RX ADMIN — CARVEDILOL 12.5 MG: 6.25 TABLET, FILM COATED ORAL at 17:03

## 2019-05-03 RX ADMIN — FUROSEMIDE 20 MG: 10 INJECTION, SOLUTION INTRAMUSCULAR; INTRAVENOUS at 17:03

## 2019-05-03 RX ADMIN — Medication 10 ML: at 22:07

## 2019-05-03 RX ADMIN — DICYCLOMINE HYDROCHLORIDE 20 MG: 10 CAPSULE ORAL at 17:03

## 2019-05-03 RX ADMIN — Medication 10 ML: at 09:36

## 2019-05-03 RX ADMIN — ASPIRIN 81 MG: 81 TABLET, COATED ORAL at 09:36

## 2019-05-03 ASSESSMENT — PAIN DESCRIPTION - ORIENTATION: ORIENTATION: MID

## 2019-05-03 ASSESSMENT — PAIN SCALES - GENERAL
PAINLEVEL_OUTOF10: 0
PAINLEVEL_OUTOF10: 3
PAINLEVEL_OUTOF10: 0

## 2019-05-03 ASSESSMENT — PAIN DESCRIPTION - PAIN TYPE: TYPE: CHRONIC PAIN

## 2019-05-03 ASSESSMENT — PAIN DESCRIPTION - LOCATION: LOCATION: BACK;NECK

## 2019-05-03 NOTE — CONSULTS
[address] [facility] 1945    History:  Past Medical History:   Diagnosis Date    Arthritis     CAD (coronary artery disease)     [facility]     Chest pain     COPD (chronic obstructive pulmonary disease) (HCC)     Diabetes mellitus (HCC)     pump patient    Drug use     Hx of crystal meth, cocaine per patient     Emphysema lung ([facility]     Fibromyalgia     takes Lyrica for fibromyalgia and diabetic nerve pain    GERD (gastroesophageal reflux disease)     Heart attack ([facility] 1991    1st heart attack (MI) 1991, 2nd heart attack (MI) pt does not recall    Hepatitis     Hep A    History of open heart surgery     Tri[facility] Vessel Disease at [address] [facility] ([facility])    Hyperlipidemia     Hypertension     Peripheral vascular disease ([facility]     Precancerous skin lesion     not active; pt sees skin specialist    Sleep apnea        ECHO:     Conclusions      Summary   -Normal left ventricle size. There is concentric left ventricular   hypertrophy. Ejection fraction is visually estimated to be 35%. There is   There is inferior-lateral hypokinesis. hypokinesis. Grade III diastolic   dysfunction with elevated LV filling pressures.   -Mild to moderate mitral regurgitation.   -The left atrium is dilated.   -Aortic valve appears sclerotic but opens adequately.   -Mild tricuspid regurgitation with PASP of 35 mmHg.   -IVC size is dilated (>2.1 cm) but collapses > 50% with respiration   consistent with elevated RA pressure (8 mmHg).     Signature      ------------------------------------------------------------------   Electronically signed by Kenneth Cordoba MD, [facility] (Interpreting   [id number]) on 05/02/2019 at 01:42 PM   ------------------------------------------------------------------    ACE/ARB: . Losartan 50 mg daily  BB: Carvedilol 12.   Aldosterone Antagonist: No ace/arb due to following with the [facility] Admission:4/20/19 for pneumonia   Code Status: WEIGHT: Admit Weight: 156 lb (70.8 kg)      Today  Weight: 165 lb 12.8 oz (75.2 kg)   2. I/O     Intake/Output Summary (Last 24 hours) at 5/3/2019 1731  Last data filed at 5/3/2019 1706  Gross per 24 hour   Intake 1873 ml   Output 6750 ml   Net -4877 ml       Recommendations:   1. Patient will need a follow up at the [facility]  2. Educate further on fluid restriction 48 oz- 64 oz during inpatient stay so he can understand how to measure intake at home. 3. Continue to educate on S/S.   4. Emphasize daily weights, diet, and knowing when and who to call  5. Provided patient with CHF Resource Line for questions and concerns.        Elroy Leventhal 5/3/2019 5:31 PM

## 2019-05-03 NOTE — PLAN OF CARE
Problem: Falls - Risk of:  Goal: Will remain free from falls  Description  Will remain free from falls  5/3/2019 0204 by Clayton Trevino RN  Outcome: Ongoing  Note:       Up to bathroom independently, gait weak but steady. Medium fall risk,  precautions in place, call light in reach, frequent monitoring.[facility]  5/2/2019 1502 by Kelsey Joaquin RN  Outcome: Ongoing  Note:   Pt remains free from falls. Safety precautions in place. Bed in lowest position, bed wheels locked, call light with in reach, bed alarm on, yellow blanket in place, fall risk wrist band on, SAFE outside of doorway. Will continue to monitor. Problem: Fluid Volume:  Goal: Ability to maintain a balanced intake and output will improve  Description  Ability to maintain a balanced intake and output will improve  5/3/2019 0204 by Clayton Trevino RN  Outcome: Ongoing  Note:        Insulin pump in place, managed by pt, insert site unremarkable.[facility]  5/2/2019 2059 by Chema Delgado RN  Outcome: Ongoing  Note:   Pt blood sugar 332, bolus given per self via insulin pump of 10.1 units     Problem: Breathing Pattern - Ineffective:  Goal: Ability to achieve and maintain a regular respiratory rate will improve  Description  Ability to achieve and maintain a regular respiratory rate will improve  Outcome: Ongoing  Note:         RR 16/min, even. LSCTA. SOB on exertion, denies having any chest pain. Oxygen saturation 94% 6L high flow cannula. Mild resp distress noted on exertion. Medication/treatment as ordered. [facility]

## 2019-05-03 NOTE — PLAN OF CARE
Problem: Falls - Risk of:  Goal: Will remain free from falls  Description  Will remain free from falls  5/3/2019 0204 by Ursula Tenorio RN  Outcome: Ongoing  Note:       Up to bathroom independently, gait weak but steady. Medium fall risk,  precautions in place, call light in reach, frequent monitoring.[facility]  5/2/2019 1502 by Ector Mcnulty RN  Outcome: Ongoing  Note:   Pt remains free from falls. Safety precautions in place. Bed in lowest position, bed wheels locked, call light with in reach, bed alarm on, yellow blanket in place, fall risk wrist band on, SAFE outside of doorway. Will continue to monitor.       Problem: Fluid Volume:  Goal: Ability to maintain a balanced intake and output will improve  Description  Ability to maintain a balanced intake and output will improve  5/3/2019 0204 by Ursula Tenorio RN  Outcome: Ongoing  Note:        Insulin pump in place, managed by pt, insert site unremarkable.[facility]  5/2/2019 2059 by Dimas Chang RN  Outcome: Ongoing  Note:   Pt blood sugar 332, bolus given per self via insulin pump of 10.1 units

## 2019-05-03 NOTE — PLAN OF CARE
Problem: Metabolic:  Goal: Ability to maintain appropriate glucose levels will improve  Description  Ability to maintain appropriate glucose levels will improve  Outcome: Ongoing  Note:   After eating breakfast pt blood sugar is now 149

## 2019-05-03 NOTE — CONSULTS
[facility], [place], operated by [facility]  Advanced CHF/Pulmonary Hypertension   Cardiac Evaluation      Marisela Ramirez  YOB: 1945    Requesting PHysician:  Dr. Estela Wilson      Chief Complaint   Patient presents with    Shortness of Breath     pt brought in via [facility] , x/o sob worsening this am. Pt has taken ATB recently for PNA. Pt sp02 upon ems arrival was in the 80's         History of Present Illness:  Marisela Ramirez is a 70 yo male with a history of diabetes, pneumonia, COPD, CAD, s/p 2 CABG operations 1990 and 4 years later who presents to the ED with productive cough with frothy sputum and occasional streaks of blood for one week. He had chest tightness but no pain. Denies fever. Denies abdominal pain or distention, pain or swelling in extremities. He has chronic edema in his legs. He was just discharged for treatment for multifocal pneumonia. Since last admission, he has been on 2 liters oxygen and taking steroids. We do not have any old records from the [place]. His echo yesterday shows LVEF 35%. I suspect with his CABG x 2 and CAD, DM that he has had some degree of LV dysfunction, especially since he is on carvedilol therapy. He denies chest pain. Shortness of breath was worse after steroid use. He did also notice that his edema was worse. Echo:  5/2/19:   -Normal left ventricle size. There is concentric left ventricular   hypertrophy. Ejection fraction is visually estimated to be 35%. There is   There is inferior-lateral hypokinesis. hypokinesis. Grade III diastolic   dysfunction with elevated LV filling pressures.   -Mild to moderate mitral regurgitation.   -The left atrium is dilated.   -Aortic valve appears sclerotic but opens adequately.   -Mild tricuspid regurgitation with PASP of 35 mmHg.   -IVC size is dilated (>2.1 cm) but collapses > 50% with respiration   consistent with elevated RA pressure (8 mmHg).     Allergies   Allergen Reactions    Gabapentin Other (See Comments)     dizzy  Morphine     Bupropion Anxiety     Current Facility-Administered Medications   Medication Dose Route Frequency Provider Last Rate Last Dose    budesonide-formoterol (SYMBICORT) 160-4.5 MCG/ACT inhaler 2 puff(Patient Supplied)  2 puff Inhalation BID Luigi Santoro MD        Umeclidinium Bromide AEPB 1 puff (Patient Supplied)  1 puff Inhalation Daily Luigi Santoro MD        albuterol sulfate  (90 Base) MCG/ACT inhaler 2 puff  2 puff Inhalation Q6H PRN Jose M Siegel MD        cefepime (MAXIPIME) 2 g IVPB minibag  2 g Intravenous Q12H Seth Ritter RN   Stopped at 05/03/19 0455    ipratropium-albuterol (DUONEB) nebulizer solution 1 ampule  1 ampule Inhalation Q4H PRN Huber Torres MD        furosemide (LASIX) injection 20 mg  20 mg Intravenous BID Jose M Siegel MD   20 mg at 05/03/19 1721    aspirin EC tablet 81 mg  81 mg Oral Daily Makayla Patel MD   81 mg at 05/03/19 0936    atorvastatin (LIPITOR) tablet 20 mg  20 mg Oral Nightly Makayla Patel MD   20 mg at 05/02/19 2101    azelastine (ASTELIN) 0.1 % nasal spray 1 spray  1 spray Each Nare BID Makayla Patel MD        carvedilol (COREG) tablet 12.5 mg  12.5 mg Oral BID WC Makayla Patel MD   12.5 mg at 05/03/19 0936    dicyclomine (BENTYL) capsule 20 mg  20 mg Oral Q6H Makayla Patel MD   20 mg at 05/03/19 1319    Insulin Pump PATIENT SUPPLIED  1 applicator Does not apply Continuous Catina Stokes MD   1 applicator at 67/25/71 0846    pantoprazole (PROTONIX) tablet 40 mg  40 mg Oral QAM AC Makayla Patel MD   40 mg at 05/03/19 0631    acetaminophen (TYLENOL) tablet 650 mg  650 mg Oral Q4H PRN Makayla Patel MD        sodium chloride flush 0.9 % injection 10 mL  10 mL Intravenous 2 times per day Catina Stokes MD   10 mL at 05/03/19 0936    sodium chloride flush 0.9 % injection 10 mL  10 mL Intravenous PRN Makayla Patel MD   10 mL at 05/01/19 1523    magnesium hydroxide (MILK OF MAGNESIA) 400 MG/5ML suspension 30 mL  30 mL Oral Daily LADI Damon MD        [facility]etron [facility]) injection 4 mg  4 mg Intravenous Q6H PRN Makayla Patel MD        enoxaparin (LOVENOX) injection 40 mg  40 mg Subcutaneous Nightly Makayla Patel MD   40 mg at 05/02/19 2101    glucose (GLUTOSE) 40 % oral gel 15 g  15 g Oral PRN Makayla Patel MD        dextrose 50 % solution 12.5 g  12.5 g Intravenous PRN Chilango Damon MD        glucagon (rDNA) injection 1 mg  1 mg Intramuscular PRN Chilango Damon MD        dextrose 5 % solution  100 mL/hr Intravenous PRLUIS Damon MD        guaiFENesin-dextromethorphan (ROBITUSSIN DM) 100-10 MG/5ML syrup 5 mL  5 mL Oral Q4H PRLUIS Damon MD           Past Medical History:   Diagnosis Date    Arthritis     CAD (coronary artery disease)     Cancer ([address]     Chest pain     COPD (chronic obstructive pulmonary disease) ([address]     Diabetes mellitus ([address]     pump patient    Drug use     Hx of crystal meth, cocaine per patient     Emphysema lung ([address]     Fibromyalgia     takes Lyrica for fibromyalgia and diabetic nerve pain    GERD (gastroesophageal reflux disease)     Heart attack ([address] 1991    1st heart attack (MI) 1991, 2nd heart attack (MI) pt does not recall    Hepatitis     Hep A    History of open heart surgery     Triple V[facility] Disease at [place] ([facility])    Hyperlipidemia     Hypertension     Peripheral vascular disease ([address]     Precancerous skin lesion     not active; pt sees skin specialist    Sleep apnea      Past Surgical History:   Procedure Laterality Date    ANKLE CLOSED REDUCTION      CARDIAC SURGERY      single vessel CABG in 1999    EYE SURGERY      cataracts     Family History   Adopted: Yes     Social History     Socioeconomic History    Marital status:      Spouse name: Not on file    Number of children: Not on file    Years of education: Not on file    Highest education level: Not on file   Occupational History    Occupation: retired     Comment: Heating/Cooling No headache, diplopia, change in muscle strength, numbness or tingling. No change in gait, balance, coordination, mood, affect, memory, mentation, behavior. · Psychiatric: No anxiety, no depression. · Endocrine: No malaise, fatigue or temperature intolerance. No excessive thirst, fluid intake, or urination. No tremor. · Hematologic/Lymphatic: No abnormal bruising or bleeding, blood clots or swollen lymph nodes. · Allergic/Immunologic: No nasal congestion or hives. Physical Examination:    Vitals:    05/03/19 0337 05/03/19 0731 05/03/19 0758 05/03/19 1317   BP: 120/71  (!) 148/69 (!) 127/56   Pulse: 54  54 55   Resp: 16  16 18   Temp: 98.1 °F (36.7 °C)  97.3 °F (36.3 °C) 98.3 °F (36.8 °C)   TempSrc: Oral  Oral Temporal   SpO2: 97%  94% 96%   Weight:  165 lb 12.8 oz (75.2 kg)     Height:         Body mass index is 24.48 kg/m². Wt Readings from Last 3 Encounters:   05/03/19 165 lb 12.8 oz (75.2 kg)   04/26/19 161 lb 1.6 oz (73.1 kg)   03/18/19 171 lb 12.8 oz (77.9 kg)     BP Readings from Last 3 Encounters:   05/03/19 (!) 127/56   04/26/19 131/66   03/18/19 (!) 128/56     Constitutional and General Appearance:   WD/WN in NAD, chronically ill appearing  HEENT:  NC/AT  AVANI  No problems with hearing  Skin:  Warm, dry  Respiratory:  · Normal excursion and expansion without use of accessory muscles  · Resp Auscultation: Normal breath sounds without dullness  Cardiovascular:  · The apical impulses not displaced  · Heart tones are crisp and normal  · Cervical veins are not engorged  · The carotid upstroke is normal in amplitude and contour without delay or bruit  · JVP 9-10 cm H2O  RRR with nl S1 and S2 without m,r,g  · Peripheral pulses are symmetrical and full  · There is no clubbing, cyanosis of the extremities.   · 1+ bilateral  edema  · Femoral Arteries: 2+ and equal  · Pedal Pulses: 2+ and equal   Neck:  · No thyromegaly  Abdomen:  · No masses or tenderness  · Liver/Spleen: No Abnormalities Noted  Neurological/Psychiatric:  · Alert and oriented in all spheres  · Moves all extremities well  · Exhibits normal gait balance and coordination  · No abnormalities of mood, affect, memory, mentation, or behavior are noted      Assessment:    1. Acute pulmonary edema (HCC)    2. Hypoxia    3. Pleural effusion     4. Acute on chronic systolic HF  5. H/o CAD, s/p CABG x 2  6. DM, on insulin pump  Plan:  I suspect he has had chronic systolic HF given that he is on carvedilol  Need old echo results from [facility]  No evidence of chest pain or other symptoms of ischemia  Start losartan 50 mg po qd  Continue carvedilol  Ideally he should be on spironolactone, but since he follows at the [facility] and I will not be able to monitor his potassium level, I will not start him on this. Continue IV lasix  CHF education reinforced. ~salt restriction  ~fluid restriction  ~medication compliance  ~daily weights and notify of any significant weight gain/loss  ~establish with CHF nurse    Once diuresed, he can be discharged home to follow up with [address] [place] [address] cardiology  Repeat BNP level tomorrow      I appreciate the opportunity of cooperating in the care of this patient.     Tisha Franklin M.D., [facility] [place]

## 2019-05-03 NOTE — PROGRESS NOTES
Pt blood sugar 51. Pt awake alert and oriented. Denies any changes in status. Pt has breakfast tray and is eating. Will recheck in 15 minutes.

## 2019-05-03 NOTE — PROGRESS NOTES
[facility] PROGRESS NOTE    5/3/2019 10:42 AM        Name: Lane Sherman . Admitted: 5/1/2019  Primary Care Provider: Harry Moon [id number] (Tel: None)      Subjective:  . Patient is sitting in bed  He states that he didn't take his inhalers at home because they felt that it was too many of them. When I asked him why he came to the hospital he said because he felt short of breath, and when I asked him if he tried any of the inhalers he said no. He said that he didn't take any prednisone at home when he doesn't want any steroid.         Reviewed interval ancillary notes    Current Medications    budesonide-formoterol (SYMBICORT) 160-4.5 MCG/ACT inhaler 2 puff(Patient Supplied) BID   Umeclidinium Bromide AEPB 1 puff (Patient Supplied) Daily   cefepime (MAXIPIME) 2 g IVPB minibag Q12H   ipratropium-albuterol (DUONEB) nebulizer solution 1 ampule Q4H PRN   furosemide (LASIX) injection 20 mg BID   aspirin EC tablet 81 mg Daily   atorvastatin (LIPITOR) tablet 20 mg Nightly   azelastine (ASTELIN) 0.1 % nasal spray 1 spray BID   carvedilol (COREG) tablet 12.5 mg BID WC   dicyclomine (BENTYL) capsule 20 mg Q6H   Insulin Pump PATIENT SUPPLIED Continuous   pantoprazole (PROTONIX) tablet 40 mg QAM AC   acetaminophen (TYLENOL) tablet 650 mg Q4H PRN   sodium chloride flush 0.9 % injection 10 mL 2 times per day   sodium chloride flush 0.9 % injection 10 mL PRN   magnesium hydroxide (MILK OF MAGNESIA) 400 MG/5ML suspension 30 mL Daily PRN   ondansetron (ZOFRAN) injection 4 mg Q6H PRN   enoxaparin (LOVENOX) injection 40 mg Nightly   glucose (GLUTOSE) 40 % oral gel 15 g PRN   dextrose 50 % solution 12.5 g PRN   glucagon (rDNA) injection 1 mg PRN   dextrose 5 % solution PRN   guaiFENesin-dextromethorphan (ROBITUSSIN DM) 100-10 MG/5ML syrup 5 mL Q4H PRN       Objective:  BP (!) 148/69   Pulse 54   Temp 97.3 °F (36.3 °C) (Oral)   Resp 16   Ht 5' 9\" (1.753 m)   Wt 165 lb 12.8 oz (75.2 kg)   SpO2 94%   BMI 24.48 kg/m²     Intake/Output Summary (Last 24 hours) at 5/3/2019 1042  Last data filed at 5/3/2019 0955  Gross per 24 hour   Intake 2053 ml   Output 4350 ml   Net -2297 ml      Wt Readings from Last 3 Encounters:   05/03/19 165 lb 12.8 oz (75.2 kg)   04/26/19 161 lb 1.6 oz (73.1 kg)   03/18/19 171 lb 12.8 oz (77.9 kg)       General appearance:  Appears comfortable  Eyes: Sclera clear. Pupils equal.  ENT: Moist oral mucosa. Trachea midline, no adenopathy. Cardiovascular: Regular rhythm, normal S1, S2. No murmur. No edema in lower extremities  Respiratory: Not using accessory muscles. Good inspiratory effort. Clear to auscultation bilaterally, no wheeze or crackles. GI: Abdomen soft, no tenderness, not distended, normal bowel sounds  Musculoskeletal: No cyanosis in digits, neck supple  Neurology: CN 2-12 grossly intact. No speech or motor deficits  Psych: Normal affect. Alert and oriented in time, place and person  Skin: Warm, dry, normal turgor    Labs and Tests:  CBC:   Recent Labs     05/01/19  1125 05/02/19  0200 05/03/19  0519   WBC 13.1* 5.3 10.0   HGB 12.0* 10.4* 10.6*   * 92* 120*     BMP:    Recent Labs     05/01/19  1125 05/02/19  0200    127*   K 4.6 4.9    92*   CO2 23 22   BUN 35* 45*   CREATININE 1.3 1.6*   GLUCOSE 216* 637*     Hepatic:   Recent Labs     05/01/19  1125   AST 30   ALT 37   BILITOT 1.0   ALKPHOS 77       Problem List  Active Problems:    Acute respiratory failure (HCC)  Resolved Problems:    * No resolved hospital problems. *       Assessment & Plan:   Acute respiratory failure with hypoxia, multifactorial, Pneumonia + pulmonary edema + undiagnosed COPD.  - Patient is noncompliant with recommendations from his doctor's at the [address] [place] [address] especially regarding the inhalers that he use.   - Blood cultures are with no growth to date, pro-calcitonin is mildly elevated, it seems that he did not take his steroids, so leukocytosis is possibly secondary to pneumonia  - Blood cultures, respiratory film by PCR, legionella urine antigen are ordered  - Continue with IV antibiotics  - Echocardiogram was done and results was reviewed  - Pulmonology consult is appreciated recommendations    Acute on chronic combined diastolic and systolic congestive heart failure, likely contributing to the above,  - Reviewed the patient chart this is the first echo that is there, not sure if the patient did have evaluation by cardiology at the [facility], we'll try to find, will ask for records from the [facility].  - Strict I's and O's, daily weights  - diuresing him with Lasix IV  - TSH with reflex  - cardiology consulted appreciate recs    latic acidosis, likely secondary to hypoxia.      Coronary artery disease.   - on home meds      Stage 3 chronic kidney disease.  - monitor renal function in house     Diabetes mellitus type 2 on insulin pump, blood sugar was very high initially, it seems that the insulin pump was not connected and the patient boluses was not going into his body, blood sugar is better controlled after the pump was connected by RN. - monitor sugars     Dyslipidemia.   -statin       Hypertension  - resume home meds         Diet: DIET CARB CONTROL;  Code:Full Code  DVT PPX lovenox      Georgie Ferrer MD   5/3/2019 10:42 AM

## 2019-05-03 NOTE — PROGRESS NOTES
[facility] Education Nurse  Consult Note       NAME:  Geoffrey Chacon  MEDICAL RECORD NUMBER:  [id number]  AGE: 69 y.o. GENDER: male  : 1945  TODAY'S DATE:  5/3/2019    Subjective:     Reason for Educator consult ---  Evaluation and Assessment:    Geoffrey Chacon is a 69 y.o. male referred by:   [] Physician  [x] Nursing  [] Other:      Consult d/t pt on pump therapy with labile blood sugars. Pt received 125 mg solumedrol on admission with subsequent hyperglycemia. Pt then became hypoglycemic (50) this morning after treating high sugars. The patient does have a glucometer at home and states testing 3-4 times a day. Pt is aware of S/S of hyper- and hypoglycemia and the proper treatment of hyper- and hypoglycemia. Explained A1C values and goals. Informed pt of his most recent A1C in our system which was 8.3 on 3/9/19. Pt counts carbs and inputs carb count into pump for boluses. Discussed medications including insulin. Pt sees eye doctor every 6 months. Pt sees podiatrist with the [place] for foot care. Pt sees endocrinologist at the [place]. Currently the patient has a Medtronic MiniMed with Novolog at the following settings:   BASAL- MN- 1  CARB RATIO-8   SENSITIVITY- 30  TARGET- 120-140     Patient states current pump site was placed last night. Patient has extra pump supplies at bedside.     Patient's pump site was visualized and is currently without edema, redness, or leaking. Patient states feeling comfortable with maintaining pump while in the hospital. Denies further questions or concerns at this time.               PAST MEDICAL HISTORY      Diagnosis Date    Arthritis     CAD (coronary artery disease)     [facility]     Chest pain     COPD (chronic obstructive pulmonary disease) ([facility]     Diabetes mellitus (HCC)     pump patient    Drug use     Hx of crystal meth, cocaine per patient     Emphysema lung ([facility]     Fibromyalgia     takes Lyrica for fibromyalgia and diabetic nerve pain    GERD (gastroesophageal reflux disease)     [facility]     1st heart attack (MI) , 2nd heart attack (MI) pt does not recall    Hepatitis     Hep A    History of open heart surgery     [facility])    Hyperlipidemia     Hypertension     Peripheral vascular disease ([facility]     Precancerous skin lesion     not active; pt sees skin specialist    Sleep apnea        PAST SURGICAL HISTORY  Past Surgical History:   Procedure Laterality Date    ANKLE CLOSED REDUCTION      CARDIAC SURGERY      single vessel CABG in     EYE SURGERY      cataracts       FAMILY HISTORY  Family History   Adopted: Yes       SOCIAL HISTORY  Social History     Tobacco Use    Smoking status: Former Smoker     Types: Cigarettes     Last attempt to quit: 2007     Years since quittin.3    Smokeless tobacco: Never Used    Tobacco comment: 3.5 PPD 14 years ago   Substance Use Topics    Alcohol use: No    Drug use: No       ALLERGIES  Allergies   Allergen Reactions    Gabapentin Other (See Comments)     dizzy    Morphine     Bupropion Anxiety       MEDICATIONS  No current facility-administered medications on file prior to encounter.       Current Outpatient Medications on File Prior to Encounter   Medication Sig Dispense Refill    Umeclidinium Bromide (INCRUSE ELLIPTA) 62.5 MCG/INH AEPB Inhale 62.5 mcg into the lungs      PREDNISONE PO Take 20 mg by mouth      budesonide-formoterol (SYMBICORT) 160-4.5 MCG/ACT AERO Inhale 2 puffs into the lungs 2 times daily 1 Inhaler 3    atorvastatin (LIPITOR) 40 MG tablet Take 20 mg by mouth nightly      Azelastine HCl 137 MCG/SPRAY SOLN 1 spray by Nasal route 2 times daily Indications: 1 spray each nare      carvedilol (COREG) 12.5 MG tablet Take 1 tablet by mouth 2 times daily (with meals) 60 tablet 3    amLODIPine (NORVASC) 10 MG tablet Take 1 tablet by mouth daily 30 tablet 3    alprostadil (EDEX) 40 MCG injection 40 mcg by Intracavitary route as needed for Erectile Dysfunction use no more than 3 times per week      Insulin Pump Accessories MISC by Does not apply route Basal  4429-6365= 1.9 units/hr  8298-5122=2.05 units/hr  6428-2215=1.4 units/hr  0236-6761=1.8 units/hr     Carb ratio 5.5  Sens 22:1  Goal 2709-8604= 100-120           1665-9123= 120-140      omeprazole (PRILOSEC) 20 MG delayed release capsule Take 20 mg by mouth daily      aspirin 81 MG tablet Take 81 mg by mouth daily      vitamin D (CHOLECALCIFEROL) 1000 UNIT TABS tablet Take 1,000 Units by mouth daily      tiotropium (SPIRIVA HANDIHALER) 18 MCG inhalation capsule Inhale 1 capsule into the lungs daily 90 capsule 1    dicyclomine (BENTYL) 20 MG tablet Take 20 mg by mouth every 6 hours         Objective:     LABS    CBC:   Lab Results   Component Value Date    WBC 10.0 05/03/2019    RBC 3.24 05/03/2019    HGB 10.6 05/03/2019    HCT 31.0 05/03/2019    MCV 95.6 05/03/2019    MCH 32.8 05/03/2019    MCHC 34.3 05/03/2019    RDW 14.1 05/03/2019     05/03/2019    MPV 10.3 05/03/2019     CMP:    Lab Results   Component Value Date     05/02/2019    K 4.9 05/02/2019    CL 92 05/02/2019    CO2 22 05/02/2019    BUN 45 05/02/2019    CREATININE 1.6 05/02/2019    GFRAA 52 05/02/2019    AGRATIO 0.8 05/01/2019    LABGLOM 43 05/02/2019    GLUCOSE 637 05/02/2019    PROT 8.2 05/01/2019    LABALBU 3.7 05/01/2019    CALCIUM 8.6 05/02/2019    BILITOT 1.0 05/01/2019    ALKPHOS 77 05/01/2019    AST 30 05/01/2019    ALT 37 05/01/2019       HgBA1c:    Lab Results   Component Value Date    LABA1C 8.3 03/09/2019       This patient's last creatinine was   Recent Labs     05/02/19  0200   CREATININE 1.6*        Recent Blood sugars have been   Lab Results   Component Value Date    POCGLU 149 05/03/2019    POCGLU 51 05/03/2019    POCGLU 332 05/02/2019    POCGLU 406 05/02/2019    POCGLU 560 05/02/2019    POCGLU 418 05/02/2019    POCGLU 524 05/02/2019    POCGLU >600 05/02/2019     Lab Results   Component Value Date    GLUCOSE 637 05/02/2019    GLUCOSE 216 05/01/2019    GLUCOSE 84 04/24/2019    GLUCOSE 292 04/20/2019    GLUCOSE 246 03/13/2019    GLUCOSE 232 03/12/2019            Assessment:     Patient Active Problem List   Diagnosis    Type 2 diabetes mellitus with complication (HCC)    Hyperkalemia    Hyponatremia    Acute kidney injury ([address]    General weakness    Malaise and fatigue    Right middle lobe pneumonia ([address]    Pneumonia due to organism    Acute encephalopathy    Fever    Hematuria    Thrombocytopenia (HCC)    Pulmonary nodule    Positive blood culture    Cholelithiasis without cholecystitis    Splenomegaly    Nodule of left lung    Chronic obstructive pulmonary disease (HCC)    Coronary artery disease due to lipid rich plaque    History of dementia    Multifocal pneumonia    Acute respiratory failure ([address]       Plan:     Plan of Care:   Continue to monitor blood sugars to determine adequacy of current dosages  Recommend maintaining a smoke-free lifestyle. Discharge Plan:  Patient will continue to need insulin at home: plans to continue pump therapy  Patient to f/u with PCP. Instructed to log blood sugars and take blood sugar log to his f/u appointment.

## 2019-05-03 NOTE — PLAN OF CARE
Problem: Fluid Volume:  Goal: Ability to maintain a balanced intake and output will improve  Description  Ability to maintain a balanced intake and output will improve  Outcome: Ongoing  Note:   Pt blood sugar 332, bolus given per self via insulin pump of 10.1 units

## 2019-05-04 LAB
ALBUMIN SERPL-MCNC: 3.1 G/DL (ref 3.4–5)
ANION GAP SERPL CALCULATED.3IONS-SCNC: 9 MMOL/L (ref 3–16)
BASOPHILS ABSOLUTE: 0.1 K/UL (ref 0–0.2)
BASOPHILS RELATIVE PERCENT: 1.1 %
BUN BLDV-MCNC: 36 MG/DL (ref 7–20)
CALCIUM SERPL-MCNC: 9.2 MG/DL (ref 8.3–10.6)
CHLORIDE BLD-SCNC: 104 MMOL/L (ref 99–110)
CO2: 29 MMOL/L (ref 21–32)
CREAT SERPL-MCNC: 1.2 MG/DL (ref 0.8–1.3)
EOSINOPHILS ABSOLUTE: 0.3 K/UL (ref 0–0.6)
EOSINOPHILS RELATIVE PERCENT: 3.1 %
GFR AFRICAN AMERICAN: >60
GFR NON-AFRICAN AMERICAN: 59
GLUCOSE BLD-MCNC: 140 MG/DL (ref 70–99)
GLUCOSE BLD-MCNC: 207 MG/DL (ref 70–99)
GLUCOSE BLD-MCNC: 320 MG/DL (ref 70–99)
GLUCOSE BLD-MCNC: 77 MG/DL (ref 70–99)
GLUCOSE BLD-MCNC: 90 MG/DL (ref 70–99)
HCT VFR BLD CALC: 33.6 % (ref 40.5–52.5)
HEMOGLOBIN: 11.5 G/DL (ref 13.5–17.5)
LYMPHOCYTES ABSOLUTE: 1.9 K/UL (ref 1–5.1)
LYMPHOCYTES RELATIVE PERCENT: 23.3 %
MAGNESIUM: 1.9 MG/DL (ref 1.8–2.4)
MCH RBC QN AUTO: 33.2 PG (ref 26–34)
MCHC RBC AUTO-ENTMCNC: 34.1 G/DL (ref 31–36)
MCV RBC AUTO: 97.1 FL (ref 80–100)
MONOCYTES ABSOLUTE: 0.4 K/UL (ref 0–1.3)
MONOCYTES RELATIVE PERCENT: 5.2 %
NEUTROPHILS ABSOLUTE: 5.6 K/UL (ref 1.7–7.7)
NEUTROPHILS RELATIVE PERCENT: 67.3 %
PDW BLD-RTO: 14.2 % (ref 12.4–15.4)
PERFORMED ON: ABNORMAL
PERFORMED ON: NORMAL
PHOSPHORUS: 3.7 MG/DL (ref 2.5–4.9)
PLATELET # BLD: 134 K/UL (ref 135–450)
PMV BLD AUTO: 10.3 FL (ref 5–10.5)
POTASSIUM SERPL-SCNC: 3.6 MMOL/L (ref 3.5–5.1)
PRO-BNP: 1742 PG/ML (ref 0–124)
RBC # BLD: 3.46 M/UL (ref 4.2–5.9)
REASON FOR REJECTION: NORMAL
REJECTED TEST: NORMAL
SODIUM BLD-SCNC: 142 MMOL/L (ref 136–145)
WBC # BLD: 8.3 K/UL (ref 4–11)

## 2019-05-04 PROCEDURE — 94640 AIRWAY INHALATION TREATMENT: CPT

## 2019-05-04 PROCEDURE — 6370000000 HC RX 637 (ALT 250 FOR IP): Performed by: INTERNAL MEDICINE

## 2019-05-04 PROCEDURE — 2580000003 HC RX 258

## 2019-05-04 PROCEDURE — 83880 ASSAY OF NATRIURETIC PEPTIDE: CPT

## 2019-05-04 PROCEDURE — 6360000002 HC RX W HCPCS

## 2019-05-04 PROCEDURE — 94760 N-INVAS EAR/PLS OXIMETRY 1: CPT

## 2019-05-04 PROCEDURE — 6360000002 HC RX W HCPCS: Performed by: NURSE PRACTITIONER

## 2019-05-04 PROCEDURE — 2700000000 HC OXYGEN THERAPY PER DAY

## 2019-05-04 PROCEDURE — 99232 SBSQ HOSP IP/OBS MODERATE 35: CPT | Performed by: NURSE PRACTITIONER

## 2019-05-04 PROCEDURE — 80069 RENAL FUNCTION PANEL: CPT

## 2019-05-04 PROCEDURE — 6360000002 HC RX W HCPCS: Performed by: INTERNAL MEDICINE

## 2019-05-04 PROCEDURE — 2580000003 HC RX 258: Performed by: HOSPITALIST

## 2019-05-04 PROCEDURE — 85025 COMPLETE CBC W/AUTO DIFF WBC: CPT

## 2019-05-04 PROCEDURE — 83735 ASSAY OF MAGNESIUM: CPT

## 2019-05-04 PROCEDURE — 87205 SMEAR GRAM STAIN: CPT

## 2019-05-04 PROCEDURE — 36415 COLL VENOUS BLD VENIPUNCTURE: CPT

## 2019-05-04 PROCEDURE — 2060000000 HC ICU INTERMEDIATE R&B

## 2019-05-04 PROCEDURE — 6360000002 HC RX W HCPCS: Performed by: HOSPITALIST

## 2019-05-04 PROCEDURE — 6370000000 HC RX 637 (ALT 250 FOR IP): Performed by: HOSPITALIST

## 2019-05-04 RX ORDER — FUROSEMIDE 20 MG/1
20 TABLET ORAL DAILY
Status: DISCONTINUED | OUTPATIENT
Start: 2019-05-05 | End: 2019-05-05 | Stop reason: HOSPADM

## 2019-05-04 RX ADMIN — BUDESONIDE AND FORMOTEROL FUMARATE DIHYDRATE 2 PUFF: 160; 4.5 AEROSOL RESPIRATORY (INHALATION) at 21:10

## 2019-05-04 RX ADMIN — Medication 10 ML: at 08:52

## 2019-05-04 RX ADMIN — DICYCLOMINE HYDROCHLORIDE 20 MG: 10 CAPSULE ORAL at 23:28

## 2019-05-04 RX ADMIN — FUROSEMIDE 20 MG: 10 INJECTION, SOLUTION INTRAMUSCULAR; INTRAVENOUS at 08:52

## 2019-05-04 RX ADMIN — DICYCLOMINE HYDROCHLORIDE 20 MG: 10 CAPSULE ORAL at 06:30

## 2019-05-04 RX ADMIN — DICYCLOMINE HYDROCHLORIDE 20 MG: 10 CAPSULE ORAL at 11:53

## 2019-05-04 RX ADMIN — AZELASTINE HYDROCHLORIDE 1 SPRAY: 137 SPRAY, METERED NASAL at 08:54

## 2019-05-04 RX ADMIN — ATORVASTATIN CALCIUM 20 MG: 20 TABLET, FILM COATED ORAL at 23:28

## 2019-05-04 RX ADMIN — LOSARTAN POTASSIUM 50 MG: 25 TABLET ORAL at 08:52

## 2019-05-04 RX ADMIN — CARVEDILOL 12.5 MG: 6.25 TABLET, FILM COATED ORAL at 18:19

## 2019-05-04 RX ADMIN — PANTOPRAZOLE SODIUM 40 MG: 40 TABLET, DELAYED RELEASE ORAL at 06:30

## 2019-05-04 RX ADMIN — DICYCLOMINE HYDROCHLORIDE 20 MG: 10 CAPSULE ORAL at 18:18

## 2019-05-04 RX ADMIN — ASPIRIN 81 MG: 81 TABLET, COATED ORAL at 08:52

## 2019-05-04 RX ADMIN — Medication 10 ML: at 16:23

## 2019-05-04 RX ADMIN — CEFEPIME HYDROCHLORIDE 2 G: 2 INJECTION, POWDER, FOR SOLUTION INTRAVENOUS at 03:47

## 2019-05-04 RX ADMIN — Medication 10 ML: at 23:28

## 2019-05-04 RX ADMIN — FUROSEMIDE 20 MG: 10 INJECTION, SOLUTION INTRAMUSCULAR; INTRAVENOUS at 18:18

## 2019-05-04 RX ADMIN — Medication 10 ML: at 18:16

## 2019-05-04 RX ADMIN — CARVEDILOL 12.5 MG: 6.25 TABLET, FILM COATED ORAL at 08:52

## 2019-05-04 RX ADMIN — ENOXAPARIN SODIUM 40 MG: 40 INJECTION SUBCUTANEOUS at 23:28

## 2019-05-04 RX ADMIN — CEFEPIME HYDROCHLORIDE 2 G: 2 INJECTION, POWDER, FOR SOLUTION INTRAVENOUS at 16:23

## 2019-05-04 RX ADMIN — BUDESONIDE AND FORMOTEROL FUMARATE DIHYDRATE 2 PUFF: 160; 4.5 AEROSOL RESPIRATORY (INHALATION) at 09:25

## 2019-05-04 ASSESSMENT — PAIN SCALES - GENERAL
PAINLEVEL_OUTOF10: 3
PAINLEVEL_OUTOF10: 0
PAINLEVEL_OUTOF10: 0
PAINLEVEL_OUTOF10: 3
PAINLEVEL_OUTOF10: 0

## 2019-05-04 NOTE — PLAN OF CARE
Problem: Falls - Risk of:  Goal: Will remain free from falls  Description  Will remain free from falls  Outcome: Ongoing  Note:   Call light in reach; bed in low position; SR up x2; pt affirms awareness and understanding of need to call for and await assist with OOB mobility if lightheaded, fatigued, or having difficulty with balance.      Problem: Metabolic:  Goal: Ability to maintain appropriate glucose levels will improve  Description  Ability to maintain appropriate glucose levels will improve  Outcome: Ongoing  Note:   Cbg 77 this AM     Problem: Breathing Pattern - Ineffective:  Goal: Ability to achieve and maintain a regular respiratory rate will improve  Description  Ability to achieve and maintain a regular respiratory rate will improve  Outcome: Ongoing  Note:   Denies dyspnea with amb to BR this AM; O2 sat 98% on 3L O2 via HiFlo NC at rest - reduced to 2L, as pt wears at home     Problem: HEMODYNAMIC STATUS  Goal: Patient has stable vital signs and fluid balance  Outcome: Ongoing  Note:   Remains mildly hypertensive, but AM meds yet to be administered; mildly bradycardic at rest; RR and temp WNL     Problem: FLUID AND ELECTROLYTE IMBALANCE  Goal: Fluid and electrolyte balance are achieved/maintained  Outcome: Ongoing  Note:   Instructed on need to limit PO fluid intake to 48-64 ounces, total, per day

## 2019-05-04 NOTE — PROGRESS NOTES
[facility] PROGRESS NOTE    5/4/2019 11:12 AM        Name: Jeffery Greene . Admitted: 5/1/2019  Primary Care Provider: Harry Moon [id number] (Tel: None)      Subjective:  .   Patient is sitting in bed  He feels much better        Reviewed interval ancillary notes    Current Medications    budesonide-formoterol (SYMBICORT) 160-4.5 MCG/ACT inhaler 2 puff(Patient Supplied) BID   Umeclidinium Bromide AEPB 1 puff (Patient Supplied) Daily   albuterol sulfate  (90 Base) MCG/ACT inhaler 2 puff Q6H PRN   losartan (COZAAR) tablet 50 mg Daily   cefepime (MAXIPIME) 2 g IVPB minibag Q12H   ipratropium-albuterol (DUONEB) nebulizer solution 1 ampule Q4H PRN   furosemide (LASIX) injection 20 mg BID   aspirin EC tablet 81 mg Daily   atorvastatin (LIPITOR) tablet 20 mg Nightly   azelastine (ASTELIN) 0.1 % nasal spray 1 spray BID   carvedilol (COREG) tablet 12.5 mg BID WC   dicyclomine (BENTYL) capsule 20 mg Q6H   Insulin Pump PATIENT SUPPLIED Continuous   pantoprazole (PROTONIX) tablet 40 mg QAM AC   acetaminophen (TYLENOL) tablet 650 mg Q4H PRN   sodium chloride flush 0.9 % injection 10 mL 2 times per day   sodium chloride flush 0.9 % injection 10 mL PRN   magnesium hydroxide (MILK OF MAGNESIA) 400 MG/5ML suspension 30 mL Daily PRN   ondansetron (ZOFRAN) injection 4 mg Q6H PRN   enoxaparin (LOVENOX) injection 40 mg Nightly   glucose (GLUTOSE) 40 % oral gel 15 g PRN   dextrose 50 % solution 12.5 g PRN   glucagon (rDNA) injection 1 mg PRN   dextrose 5 % solution PRN   guaiFENesin-dextromethorphan (ROBITUSSIN DM) 100-10 MG/5ML syrup 5 mL Q4H PRN       Objective:  BP (!) 156/74   Pulse 55   Temp 98.3 °F (36.8 °C) (Oral)   Resp 18   Ht 5' 9\" (1.753 m)   Wt 164 lb (74.4 kg)   SpO2 97%   BMI 24.22 kg/m²     Intake/Output Summary (Last 24 hours) at 5/4/2019 1112  Last data filed at 5/4/2019 0958  Gross per 24 hour   Intake 880 ml Output 5125 ml   Net -4245 ml      Wt Readings from Last 3 Encounters:   05/04/19 164 lb (74.4 kg)   04/26/19 161 lb 1.6 oz (73.1 kg)   03/18/19 171 lb 12.8 oz (77.9 kg)       General appearance:  Appears comfortable  Eyes: Sclera clear. Pupils equal.  ENT: Moist oral mucosa. Trachea midline, no adenopathy. Cardiovascular: Regular rhythm, normal S1, S2. No murmur. No edema in lower extremities  Respiratory: Not using accessory muscles. Good inspiratory effort. Clear to auscultation bilaterally, no wheeze or crackles. GI: Abdomen soft, no tenderness, not distended, normal bowel sounds  Musculoskeletal: No cyanosis in digits, neck supple  Neurology: CN 2-12 grossly intact. No speech or motor deficits  Psych: Normal affect. Alert and oriented in time, place and person  Skin: Warm, dry, normal turgor    Labs and Tests:  CBC:   Recent Labs     05/02/19  0200 05/03/19  0519 05/04/19  0452   WBC 5.3 10.0 8.3   HGB 10.4* 10.6* 11.5*   PLT 92* 120* 134*     BMP:    Recent Labs     05/02/19  0200 05/03/19  1109 05/04/19  0452   * 140 142   K 4.9 3.8 3.6   CL 92* 104 104   CO2 22 26 29   BUN 45* 38* 36*   CREATININE 1.6* 1.4* 1.2   GLUCOSE 637* 164* 90     Hepatic:   Recent Labs     05/01/19  1125   AST 30   ALT 37   BILITOT 1.0   ALKPHOS 77       Problem List  Active Problems:    Acute respiratory failure (HCC)  Resolved Problems:    * No resolved hospital problems. *       Assessment & Plan:   Acute respiratory failure with hypoxia, multifactorial, Pneumonia + pulmonary edema + undiagnosed COPD.  - Patient is noncompliant with recommendations from his doctor's at the [address] [place] [address] especially regarding the inhalers that he use.   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      Coronary artery disease.   - on home meds      Stage 3 chronic kidney disease.  - monitor renal function in house     Diabetes mellitus type 2 on insulin pump, blood sugar was very high initially, it seems that the insulin pump was not connected and the patient boluses was not going into his body, blood sugar is better controlled after the pump was connected by RN. - monitor sugars     Dyslipidemia.   -statin       Hypertension  - resume home meds         Diet: DIET CARB CONTROL;  Code:Full Code  DVT PPX lovenox      Katja [place] MD   5/4/2019 11:12 AM

## 2019-05-04 NOTE — PROGRESS NOTES
Resting quietly with eyes closed - aroused easily - O2 sat on RA as pt awakened 86% - O2 reapplied at 2L vis NC; no wheezing or crackles evident at this time, but breath sounds remain diminished at bases; no other significant changes since earlier assessment, except as noted; BP mildly elevated (151/66) no other s/s of acute distress at present.

## 2019-05-04 NOTE — PROGRESS NOTES
Mild bilat end-exp wheezes clear with nonprod DB effort; RA O2 sat at rest = 92% - instructed on plan to ambulate in rock on RA, with available O2 in hand, to check RA sats with exertion; no other s/s of distress at present, but pt states he experiences mild lightheadedness at times - resolves quickly at rest.

## 2019-05-04 NOTE — PROGRESS NOTES
[address]   Daily Progress Note      Admit Date:  5/1/2019    HPI:    Mr. Mary Carmen Fu is a 68 yo male with PMH significant for CAD/CABG x 2, COPD, DM, HTN. He was recently hospitalized with multifocal pneumonia (4/20-4/26). He presented to hospital with cough productive frothy sputum, chest tightness. CXR with pulmonary edema, proBNP 5800. Echo revealed EF 14% and diastolic dysfunction. Previous EF 35-40% (2007)       Subjective:  Patient is being seen for systolic heart failure. There were no acute overnight cardiac events. Presently resting in bed, O2 sat 92% on room air. Reports he is feeling better but overall a little weak. When he was up to bathroom earlier he became lightheaded. Mildly winded with activity, denies chest pain, palpitations, HOB up to sleep for breathing comfort. Objective:   BP (!) 156/74   Pulse 55   Temp 98.3 °F (36.8 °C) (Oral)   Resp 18   Ht 5' 9\" (1.753 m)   Wt 164 lb (74.4 kg)   SpO2 97%   BMI 24.22 kg/m²       Intake/Output Summary (Last 24 hours) at 5/4/2019 1218  Last data filed at 5/4/2019 1155  Gross per 24 hour   Intake 880 ml   Output 5775 ml   Net -4895 ml     Telemetry: sinus valerie (58)      Physical Exam:  General:  Awake, alert, oriented in NAD  Skin:  Warm and dry. No unusual bruising or rash  Neck:  Supple. No JVD appreciated  Chest:  Normal effort.   Clear to auscultation  Cardiovascular:  RRR, S1/S2, no murmur/gallop/rub  Abdomen:  Soft, nontender, +bowel sounds  Extremities:  Trace BLE edema  Neurological: No focal deficits  Psychological: Normal mood and affect      Medications:    budesonide-formoterol  2 puff Inhalation BID    Umeclidinium Bromide  1 puff Inhalation Daily    losartan  50 mg Oral Daily    cefepime  2 g Intravenous Q12H    furosemide  20 mg Intravenous BID    aspirin  81 mg Oral Daily    atorvastatin  20 mg Oral Nightly    azelastine  1 spray Each Nare BID    carvedilol  12.5 mg Oral BID WC    dicyclomine  20 mg Oral Q6H    pantoprazole  40 mg Oral QAM AC    sodium chloride flush  10 mL Intravenous 2 times per day    enoxaparin  40 mg Subcutaneous Nightly      Insulin Pump Accessories      dextrose         Lab Data:  CBC:   Recent Labs     05/02/19  0200 05/03/19  0519 05/04/19  0452   WBC 5.3 10.0 8.3   HGB 10.4* 10.6* 11.5*   PLT 92* 120* 134*     BMP:    Recent Labs     05/02/19  0200 05/03/19  1109 05/04/19  0452   * 140 142   K 4.9 3.8 3.6   CO2 22 26 29   BUN 45* 38* 36*   CREATININE 1.6* 1.4* 1.2     INR:  No results for input(s): INR in the last 72 hours. BNP:    Recent Labs     05/04/19  0452   PROBNP 1,742*         Diagnostics:    Echo 5/2/19:   -Normal left ventricle size. There is concentric left ventricular   hypertrophy. Ejection fraction is visually estimated to be 35%.    There is inferior-lateral hypokinesis. hypokinesis. Grade III diastolic dysfunction with elevated LV filling pressures.   -Mild to moderate mitral regurgitation.   -The left atrium is dilated.   -Aortic valve appears sclerotic but opens adequately.   -Mild tricuspid regurgitation with PASP of 35 mmHg.   -IVC size is dilated (>2.1 cm) but collapses > 50% with respiration consistent with elevated RA pressure (8 mmHg). Assessment and Plan:    1. Acute on chronic systolic heart failure   - NYHA class II   - subjective improvement with diuresis   - proBNP 2011->8246   - renal numbers improved with diuresis   - continue IV Lasix today, transition to po tomorrow    2. Ischemic cardiomyopathy   - EF 35%   - on ARB and evidence based beta blocker   - no aldosterone antagonist [facility]    3. CAD   - hx CABG x 2    - denies chest pain   - on asa, statin and beta blocker    4. Hypertension   - controlled   - continue current management    Increase activity. Likely home tomorrow. Discussed with patient who is agreeable with plan of care. Thank you for allowing me to participate in the care of your patient.     Rock Wood IAN Mariscal-CNP

## 2019-05-05 VITALS
WEIGHT: 163 LBS | SYSTOLIC BLOOD PRESSURE: 128 MMHG | DIASTOLIC BLOOD PRESSURE: 79 MMHG | HEART RATE: 58 BPM | BODY MASS INDEX: 24.14 KG/M2 | HEIGHT: 69 IN | RESPIRATION RATE: 17 BRPM | TEMPERATURE: 97.5 F | OXYGEN SATURATION: 99 %

## 2019-05-05 LAB
ALBUMIN SERPL-MCNC: 3.1 G/DL (ref 3.4–5)
ANION GAP SERPL CALCULATED.3IONS-SCNC: 11 MMOL/L (ref 3–16)
BASOPHILS ABSOLUTE: 0.1 K/UL (ref 0–0.2)
BASOPHILS RELATIVE PERCENT: 0.9 %
BUN BLDV-MCNC: 34 MG/DL (ref 7–20)
CALCIUM SERPL-MCNC: 9.4 MG/DL (ref 8.3–10.6)
CHLORIDE BLD-SCNC: 104 MMOL/L (ref 99–110)
CO2: 27 MMOL/L (ref 21–32)
CREAT SERPL-MCNC: 1.3 MG/DL (ref 0.8–1.3)
EOSINOPHILS ABSOLUTE: 0.3 K/UL (ref 0–0.6)
EOSINOPHILS RELATIVE PERCENT: 4 %
GFR AFRICAN AMERICAN: >60
GFR NON-AFRICAN AMERICAN: 54
GLUCOSE BLD-MCNC: 253 MG/DL (ref 70–99)
GLUCOSE BLD-MCNC: 55 MG/DL (ref 70–99)
GLUCOSE BLD-MCNC: 77 MG/DL (ref 70–99)
HCT VFR BLD CALC: 34.7 % (ref 40.5–52.5)
HEMOGLOBIN: 11.8 G/DL (ref 13.5–17.5)
LYMPHOCYTES ABSOLUTE: 1.7 K/UL (ref 1–5.1)
LYMPHOCYTES RELATIVE PERCENT: 23.8 %
MAGNESIUM: 2.1 MG/DL (ref 1.8–2.4)
MCH RBC QN AUTO: 32.5 PG (ref 26–34)
MCHC RBC AUTO-ENTMCNC: 33.9 G/DL (ref 31–36)
MCV RBC AUTO: 95.8 FL (ref 80–100)
MONOCYTES ABSOLUTE: 0.5 K/UL (ref 0–1.3)
MONOCYTES RELATIVE PERCENT: 7.5 %
NEUTROPHILS ABSOLUTE: 4.6 K/UL (ref 1.7–7.7)
NEUTROPHILS RELATIVE PERCENT: 63.8 %
PDW BLD-RTO: 13.7 % (ref 12.4–15.4)
PERFORMED ON: ABNORMAL
PERFORMED ON: ABNORMAL
PHOSPHORUS: 3.6 MG/DL (ref 2.5–4.9)
PLATELET # BLD: 146 K/UL (ref 135–450)
PMV BLD AUTO: 9.6 FL (ref 5–10.5)
POTASSIUM SERPL-SCNC: 3.5 MMOL/L (ref 3.5–5.1)
RBC # BLD: 3.62 M/UL (ref 4.2–5.9)
SODIUM BLD-SCNC: 142 MMOL/L (ref 136–145)
WBC # BLD: 7.3 K/UL (ref 4–11)

## 2019-05-05 PROCEDURE — 2700000000 HC OXYGEN THERAPY PER DAY

## 2019-05-05 PROCEDURE — 99232 SBSQ HOSP IP/OBS MODERATE 35: CPT | Performed by: NURSE PRACTITIONER

## 2019-05-05 PROCEDURE — 2580000003 HC RX 258: Performed by: HOSPITALIST

## 2019-05-05 PROCEDURE — 6370000000 HC RX 637 (ALT 250 FOR IP): Performed by: INTERNAL MEDICINE

## 2019-05-05 PROCEDURE — 80069 RENAL FUNCTION PANEL: CPT

## 2019-05-05 PROCEDURE — 6370000000 HC RX 637 (ALT 250 FOR IP): Performed by: NURSE PRACTITIONER

## 2019-05-05 PROCEDURE — 2580000003 HC RX 258

## 2019-05-05 PROCEDURE — 83735 ASSAY OF MAGNESIUM: CPT

## 2019-05-05 PROCEDURE — 85025 COMPLETE CBC W/AUTO DIFF WBC: CPT

## 2019-05-05 PROCEDURE — 94760 N-INVAS EAR/PLS OXIMETRY 1: CPT

## 2019-05-05 PROCEDURE — 6360000002 HC RX W HCPCS

## 2019-05-05 PROCEDURE — 94640 AIRWAY INHALATION TREATMENT: CPT

## 2019-05-05 PROCEDURE — 36415 COLL VENOUS BLD VENIPUNCTURE: CPT

## 2019-05-05 PROCEDURE — 6370000000 HC RX 637 (ALT 250 FOR IP): Performed by: HOSPITALIST

## 2019-05-05 RX ORDER — GUAIFENESIN/DEXTROMETHORPHAN 100-10MG/5
5 SYRUP ORAL EVERY 4 HOURS PRN
Qty: 120 ML | Refills: 0 | Status: SHIPPED | OUTPATIENT
Start: 2019-05-05 | End: 2019-05-07

## 2019-05-05 RX ORDER — FUROSEMIDE 20 MG/1
20 TABLET ORAL DAILY
Qty: 10 TABLET | Refills: 0 | Status: SHIPPED | OUTPATIENT
Start: 2019-05-06 | End: 2019-05-16

## 2019-05-05 RX ORDER — ALBUTEROL SULFATE 90 UG/1
2 AEROSOL, METERED RESPIRATORY (INHALATION) EVERY 6 HOURS PRN
Qty: 1 INHALER | Refills: 3 | Status: SHIPPED | OUTPATIENT
Start: 2019-05-05

## 2019-05-05 RX ORDER — FUROSEMIDE 20 MG/1
20 TABLET ORAL DAILY
Qty: 60 TABLET | Refills: 3 | Status: SHIPPED | OUTPATIENT
Start: 2019-05-06 | End: 2019-05-05

## 2019-05-05 RX ORDER — LOSARTAN POTASSIUM 50 MG/1
50 TABLET ORAL DAILY
Qty: 30 TABLET | Refills: 3 | Status: SHIPPED | OUTPATIENT
Start: 2019-05-05

## 2019-05-05 RX ORDER — GREEN TEA/HOODIA GORDONII 315-12.5MG
1 CAPSULE ORAL DAILY
Qty: 7 TABLET | Refills: 0 | Status: SHIPPED | OUTPATIENT
Start: 2019-05-05 | End: 2019-05-12

## 2019-05-05 RX ORDER — AMOXICILLIN AND CLAVULANATE POTASSIUM 875; 125 MG/1; MG/1
1 TABLET, FILM COATED ORAL 2 TIMES DAILY
Qty: 14 TABLET | Refills: 0 | Status: SHIPPED | OUTPATIENT
Start: 2019-05-05 | End: 2019-05-12

## 2019-05-05 RX ADMIN — Medication 10 ML: at 04:37

## 2019-05-05 RX ADMIN — FUROSEMIDE 20 MG: 20 TABLET ORAL at 09:56

## 2019-05-05 RX ADMIN — DICYCLOMINE HYDROCHLORIDE 20 MG: 10 CAPSULE ORAL at 04:37

## 2019-05-05 RX ADMIN — BUDESONIDE AND FORMOTEROL FUMARATE DIHYDRATE 2 PUFF: 160; 4.5 AEROSOL RESPIRATORY (INHALATION) at 08:33

## 2019-05-05 RX ADMIN — Medication 10 ML: at 09:55

## 2019-05-05 RX ADMIN — DICYCLOMINE HYDROCHLORIDE 20 MG: 10 CAPSULE ORAL at 10:55

## 2019-05-05 RX ADMIN — PANTOPRAZOLE SODIUM 40 MG: 40 TABLET, DELAYED RELEASE ORAL at 04:38

## 2019-05-05 RX ADMIN — ASPIRIN 81 MG: 81 TABLET, COATED ORAL at 09:56

## 2019-05-05 RX ADMIN — CARVEDILOL 12.5 MG: 6.25 TABLET, FILM COATED ORAL at 09:56

## 2019-05-05 RX ADMIN — CEFEPIME HYDROCHLORIDE 2 G: 2 INJECTION, POWDER, FOR SOLUTION INTRAVENOUS at 03:37

## 2019-05-05 RX ADMIN — POTASSIUM BICARBONATE 20 MEQ: 782 TABLET, EFFERVESCENT ORAL at 10:55

## 2019-05-05 ASSESSMENT — PAIN SCALES - GENERAL
PAINLEVEL_OUTOF10: 0
PAINLEVEL_OUTOF10: 0

## 2019-05-05 NOTE — PROGRESS NOTES
[facility]   Daily Progress Note      Admit Date:  5/1/2019    HPI:    Mr. Gaurang Nava is a 68 yo male with PMH significant for CAD/CABG x 2, COPD, DM, HTN. He was recently hospitalized with multifocal pneumonia (4/20-4/26). He presented to hospital with cough productive frothy sputum, chest tightness. CXR with pulmonary edema, proBNP 5800. Echo revealed EF 18% and diastolic dysfunction. Previous EF 35-40% (2007)       Subjective:  Patient is being seen for systolic heart failure. There were no acute overnight cardiac events. Presently sitting on side of bed, family visiting. Patient reports he is feeling better today. No further lightheadedness or dizziness. Denies shortness of breath at rest or with ADLs. O2 needs stable at 2 liters. Denies orthopnea, chest pain, palpitations. He is expressing concern about risk of readmission, agreeable to be seen in cardiology office this week. Objective:   /71   Pulse 57   Temp 97.3 °F (36.3 °C) (Temporal)   Resp 16   Ht 5' 9\" (1.753 m)   Wt 163 lb (73.9 kg)   SpO2 99%   BMI 24.07 kg/m²       Intake/Output Summary (Last 24 hours) at 5/5/2019 1008  Last data filed at 5/5/2019 0836  Gross per 24 hour   Intake 540 ml   Output 3200 ml   Net -2660 ml     Telemetry: sinus valerie (58)      Physical Exam:  General:  Awake, alert, oriented in NAD  Skin:  Warm and dry. No unusual bruising or rash  Neck:  Supple. No JVD appreciated  Chest:  Normal effort.   Clear to auscultation  Cardiovascular:  RRR, S1/S2, no murmur/gallop/rub  Abdomen:  Soft, nontender, +bowel sounds  Extremities:  No edema  Neurological: No focal deficits  Psychological: Normal mood and affect      Medications:    furosemide  20 mg Oral Daily    budesonide-formoterol  2 puff Inhalation BID    Umeclidinium Bromide  1 puff Inhalation Daily    losartan  50 mg Oral Daily    cefepime  2 g Intravenous Q12H    aspirin  81 mg Oral Daily    atorvastatin  20 mg Oral Nightly    azelastine  1 spray Each Nare BID    carvedilol  12.5 mg Oral BID WC    dicyclomine  20 mg Oral Q6H    pantoprazole  40 mg Oral QAM AC    sodium chloride flush  10 mL Intravenous 2 times per day    enoxaparin  40 mg Subcutaneous Nightly      Insulin Pump Accessories      dextrose         Lab Data:  CBC:   Recent Labs     05/03/19  0519 05/04/19  0452 05/05/19  0542   WBC 10.0 8.3 7.3   HGB 10.6* 11.5* 11.8*   * 134* 146     BMP:    Recent Labs     05/03/19  1109 05/04/19  0452 05/05/19  0542    142 142   K 3.8 3.6 3.5   CO2 26 29 27   BUN 38* 36* 34*   CREATININE 1.4* 1.2 1.3     INR:  No results for input(s): INR in the last 72 hours. BNP:    Recent Labs     05/04/19  0452   PROBNP 1,742*         Diagnostics:    Echo 5/2/19:   -Normal left ventricle size. There is concentric left ventricular   hypertrophy. Ejection fraction is visually estimated to be 35%.    There is inferior-lateral hypokinesis. hypokinesis. Grade III diastolic dysfunction with elevated LV filling pressures.   -Mild to moderate mitral regurgitation.   -The left atrium is dilated.   -Aortic valve appears sclerotic but opens adequately.   -Mild tricuspid regurgitation with PASP of 35 mmHg.   -IVC size is dilated (>2.1 cm) but collapses > 50% with respiration consistent with elevated RA pressure (8 mmHg). Assessment and Plan:    1. Acute on chronic systolic heart failure   - NYHA class II   - subjective improvement with diuresis   - proBNP 1187->0385   - appears stable   - has transitioned to po Lasix    2. Ischemic cardiomyopathy   - EF 35%   - on ARB and evidence based beta blocker   - no aldosterone antagonist [facility]    3. CAD   - hx CABG x 2    - denies chest pain   - on asa, statin and beta blocker    4. Hypertension   - controlled   - continue current management    Patient appears stable and may be discharged from cardiac standpoint on current regimen.  He is agreeable to follow with [facility] until he can get a follow up appointment with the [place]. Will ask office to call and schedule follow up this week. Discussed with Dr Iris Hutchison. Discussed with patient who is agreeable with plan of care. Thank you for allowing me to participate in the care of your patient.     IAN Houser-CNP

## 2019-05-05 NOTE — PLAN OF CARE
Problem: HEMODYNAMIC STATUS  Goal: Patient has stable vital signs and fluid balance  5/4/2019 0835 by Richardson Vera RN  Outcome: Ongoing  Note:  Assessment complete, VSS. Pt denies any other needs at this time, will continue to monitor.  Erica Herndon RN

## 2019-05-05 NOTE — PLAN OF CARE
Problem: Falls - Risk of:  Goal: Will remain free from falls  Description  Will remain free from falls  Outcome: Ongoing  Note:   Call light in reach; bed in low position; SR up x2; pt affirms awareness and understanding of need to call for and await assist with OOB mobility if lightheaded, fatigued or having difficulty with balance.      Problem: Metabolic:  Goal: Ability to maintain appropriate glucose levels will improve  Description  Ability to maintain appropriate glucose levels will improve  Outcome: Ongoing  Note:   Cbg 55, pt displays shaky, tremulous movement of hands -  ml given PO - pt affirms subjective symptoms improved quickly     Problem: Breathing Pattern - Ineffective:  Goal: Ability to achieve and maintain a regular respiratory rate will improve  Description  Ability to achieve and maintain a regular respiratory rate will improve  Outcome: Ongoing  Note:   Remains mildly JEFFREY, but resolves quickly at rest     Problem: HEMODYNAMIC STATUS  Goal: Patient has stable vital signs and fluid balance  Outcome: Ongoing  Note:   BP WNL in advance of AM meds; HR, temp, RR, Os sat on 2L O2 all WNL     Problem: FLUID AND ELECTROLYTE IMBALANCE  Goal: Fluid and electrolyte balance are achieved/maintained  Outcome: Ongoing  Note:   No edema evident; 1 lb wt loss today compared with yesterday     Problem: OXYGENATION/RESPIRATORY FUNCTION  Goal: Patient will maintain patent airway  Outcome: Ongoing  Note:   Breath sounds generally diminished bilat - minimal air movement heard over mid/upper lobes with DB effort; no wheezes or crackles present - pt just completed RT tx's

## 2019-05-05 NOTE — PROGRESS NOTES
[facility] PROGRESS NOTE    5/5/2019 10:10 AM        Name: Estefani Almonte . Admitted: 5/1/2019  Primary Care Provider: Harry Moon [id number] (Tel: None)      Subjective:  .   Patient is sitting in bed  He feels much better        Reviewed interval ancillary notes    Current Medications    potassium chloride (KLOR-CON) packet 20 mEq Once   furosemide (LASIX) tablet 20 mg Daily   budesonide-formoterol (SYMBICORT) 160-4.5 MCG/ACT inhaler 2 puff(Patient Supplied) BID   Umeclidinium Bromide AEPB 1 puff (Patient Supplied) Daily   albuterol sulfate  (90 Base) MCG/ACT inhaler 2 puff Q6H PRN   losartan (COZAAR) tablet 50 mg Daily   cefepime (MAXIPIME) 2 g IVPB minibag Q12H   ipratropium-albuterol (DUONEB) nebulizer solution 1 ampule Q4H PRN   aspirin EC tablet 81 mg Daily   atorvastatin (LIPITOR) tablet 20 mg Nightly   azelastine (ASTELIN) 0.1 % nasal spray 1 spray BID   carvedilol (COREG) tablet 12.5 mg BID WC   dicyclomine (BENTYL) capsule 20 mg Q6H   Insulin Pump PATIENT SUPPLIED Continuous   pantoprazole (PROTONIX) tablet 40 mg QAM AC   acetaminophen (TYLENOL) tablet 650 mg Q4H PRN   sodium chloride flush 0.9 % injection 10 mL 2 times per day   sodium chloride flush 0.9 % injection 10 mL PRN   magnesium hydroxide (MILK OF MAGNESIA) 400 MG/5ML suspension 30 mL Daily PRN   ondansetron (ZOFRAN) injection 4 mg Q6H PRN   enoxaparin (LOVENOX) injection 40 mg Nightly   glucose (GLUTOSE) 40 % oral gel 15 g PRN   dextrose 50 % solution 12.5 g PRN   glucagon (rDNA) injection 1 mg PRN   dextrose 5 % solution PRN   guaiFENesin-dextromethorphan (ROBITUSSIN DM) 100-10 MG/5ML syrup 5 mL Q4H PRN       Objective:  /71   Pulse 57   Temp 97.3 °F (36.3 °C) (Temporal)   Resp 16   Ht 5' 9\" (1.753 m)   Wt 163 lb (73.9 kg)   SpO2 99%   BMI 24.07 kg/m²     Intake/Output Summary (Last 24 hours) at 5/5/2019 1010  Last data filed at 5/5/2019 0836  Gross per 24 hour   Intake 540 ml   Output 3200 ml   Net -2660 ml      Wt Readings from Last 3 Encounters:   05/05/19 163 lb (73.9 kg)   04/26/19 161 lb 1.6 oz (73.1 kg)   03/18/19 171 lb 12.8 oz (77.9 kg)       General appearance:  Appears comfortable  Eyes: Sclera clear. Pupils equal.  ENT: Moist oral mucosa. Trachea midline, no adenopathy. Cardiovascular: Regular rhythm, normal S1, S2. No murmur. No edema in lower extremities  Respiratory: Not using accessory muscles. Good inspiratory effort. Clear to auscultation bilaterally, no wheeze or crackles. GI: Abdomen soft, no tenderness, not distended, normal bowel sounds  Musculoskeletal: No cyanosis in digits, neck supple  Neurology: CN 2-12 grossly intact. No speech or motor deficits  Psych: Normal affect. Alert and oriented in time, place and person  Skin: Warm, dry, normal turgor    Labs and Tests:  CBC:   Recent Labs     05/03/19  0519 05/04/19  0452 05/05/19  0542   WBC 10.0 8.3 7.3   HGB 10.6* 11.5* 11.8*   * 134* 146     BMP:    Recent Labs     05/03/19  1109 05/04/19  0452 05/05/19  0542    142 142   K 3.8 3.6 3.5    104 104   CO2 26 29 27   BUN 38* 36* 34*   CREATININE 1.4* 1.2 1.3   GLUCOSE 164* 90 77     Hepatic:   No results for input(s): AST, ALT, ALB, BILITOT, ALKPHOS in the last 72 hours. Problem List  Active Problems:    Acute respiratory failure (HCC)    Acute on chronic systolic CHF (congestive heart failure) (HCC)    Ischemic cardiomyopathy    Coronary artery disease involving native coronary artery of native heart without angina pectoris    Essential hypertension  Resolved Problems:    * No resolved hospital problems. *       Assessment & Plan:   Acute respiratory failure with hypoxia, multifactorial, Pneumonia + pulmonary edema + undiagnosed COPD.  - Patient is noncompliant with recommendations from his doctor's at the [facility] especially regarding the inhalers that he use.   - Blood cultures are with no growth to

## 2019-05-06 ENCOUNTER — TELEPHONE (OUTPATIENT)
Dept: OTHER | Age: 74
End: 2019-05-06

## 2019-05-06 ENCOUNTER — TELEPHONE (OUTPATIENT)
Dept: CARDIOLOGY CLINIC | Age: 74
End: 2019-05-06

## 2019-05-06 LAB
BLOOD CULTURE, ROUTINE: NORMAL
CULTURE, BLOOD 2: NORMAL

## 2019-05-06 NOTE — TELEPHONE ENCOUNTER
----- Message from IAN Phelps - CNP sent at 5/5/2019 10:40 AM EDT -----  Patient Dcd from hospital Sunday. Needs 1 week follow up. Please call, I can see him Tuesday 5/7 at 11:30 in ER slot if still available, or I can see him at 3:30 Tuesday 5/7 or Thursday 5/9. If none of those times work for him then check with Dr Buck Naranjo to see if she can add him on her schedule.    Thanks  Fatuma Mac

## 2019-05-07 ENCOUNTER — HOSPITAL ENCOUNTER (OUTPATIENT)
Age: 74
Discharge: HOME | End: 2019-05-07
Payer: MEDICARE

## 2019-05-07 ENCOUNTER — OFFICE VISIT (OUTPATIENT)
Dept: CARDIOLOGY CLINIC | Age: 74
End: 2019-05-07
Payer: MEDICARE

## 2019-05-07 VITALS
SYSTOLIC BLOOD PRESSURE: 120 MMHG | HEART RATE: 55 BPM | BODY MASS INDEX: 23.67 KG/M2 | OXYGEN SATURATION: 99 % | DIASTOLIC BLOOD PRESSURE: 46 MMHG | HEIGHT: 69 IN | RESPIRATION RATE: 16 BRPM | WEIGHT: 159.8 LBS

## 2019-05-07 DIAGNOSIS — I50.22 CHRONIC SYSTOLIC HEART FAILURE (HCC): Primary | ICD-10-CM

## 2019-05-07 DIAGNOSIS — I10 ESSENTIAL HYPERTENSION: ICD-10-CM

## 2019-05-07 DIAGNOSIS — I25.10 CORONARY ARTERY DISEASE INVOLVING NATIVE CORONARY ARTERY OF NATIVE HEART WITHOUT ANGINA PECTORIS: ICD-10-CM

## 2019-05-07 DIAGNOSIS — I25.5 ISCHEMIC CARDIOMYOPATHY: ICD-10-CM

## 2019-05-07 DIAGNOSIS — I50.22 CHRONIC SYSTOLIC HEART FAILURE (HCC): ICD-10-CM

## 2019-05-07 LAB
ANION GAP SERPL CALCULATED.3IONS-SCNC: 11 MMOL/L (ref 3–16)
BUN BLDV-MCNC: 37 MG/DL (ref 7–20)
CALCIUM SERPL-MCNC: 9.3 MG/DL (ref 8.3–10.6)
CHLORIDE BLD-SCNC: 102 MMOL/L (ref 99–110)
CO2: 27 MMOL/L (ref 21–32)
CREAT SERPL-MCNC: 1.4 MG/DL (ref 0.8–1.3)
GFR AFRICAN AMERICAN: >60
GFR NON-AFRICAN AMERICAN: 50
GLUCOSE BLD-MCNC: 178 MG/DL (ref 70–99)
POTASSIUM SERPL-SCNC: 4.8 MMOL/L (ref 3.5–5.1)
PRO-BNP: 798 PG/ML (ref 0–124)
SODIUM BLD-SCNC: 140 MMOL/L (ref 136–145)

## 2019-05-07 PROCEDURE — 1036F TOBACCO NON-USER: CPT | Performed by: NURSE PRACTITIONER

## 2019-05-07 PROCEDURE — 99214 OFFICE O/P EST MOD 30 MIN: CPT | Performed by: NURSE PRACTITIONER

## 2019-05-07 PROCEDURE — 1123F ACP DISCUSS/DSCN MKR DOCD: CPT | Performed by: NURSE PRACTITIONER

## 2019-05-07 PROCEDURE — G8420 CALC BMI NORM PARAMETERS: HCPCS | Performed by: NURSE PRACTITIONER

## 2019-05-07 PROCEDURE — 4040F PNEUMOC VAC/ADMIN/RCVD: CPT | Performed by: NURSE PRACTITIONER

## 2019-05-07 PROCEDURE — 1111F DSCHRG MED/CURRENT MED MERGE: CPT | Performed by: NURSE PRACTITIONER

## 2019-05-07 PROCEDURE — 36415 COLL VENOUS BLD VENIPUNCTURE: CPT

## 2019-05-07 PROCEDURE — 80048 BASIC METABOLIC PNL TOTAL CA: CPT

## 2019-05-07 PROCEDURE — 3017F COLORECTAL CA SCREEN DOC REV: CPT | Performed by: NURSE PRACTITIONER

## 2019-05-07 PROCEDURE — G8427 DOCREV CUR MEDS BY ELIG CLIN: HCPCS | Performed by: NURSE PRACTITIONER

## 2019-05-07 PROCEDURE — G8598 ASA/ANTIPLAT THER USED: HCPCS | Performed by: NURSE PRACTITIONER

## 2019-05-07 PROCEDURE — 83880 ASSAY OF NATRIURETIC PEPTIDE: CPT

## 2019-05-07 NOTE — PATIENT INSTRUCTIONS
1. Continue current medications. 2. Continue daily weights. Call office if weight up 2 lbs overnight or 5 lbs in a week. 3. Check labs today. 4. Continue no added salt diet and fluids to 64 ounces. 5. Follow up here in 4 weeks or with [place] cardiology.

## 2019-05-07 NOTE — PROGRESS NOTES
[facility] - Cardiology      Chief Complaint: \"I feel good\"    Chief Complaint   Patient presents with    Follow-Up from Hospital     Denies cp, sob, edema    Congestive Heart Failure    Dizziness     at times, when he gets up    Fatigue       History of present illness: Olga Lidia Mcqueen is a 69 y.o. male with past medical history significant for CAD/CABG x 2, COPD, DM, HTN. He typically follows at [facility]. Patient was recently hospitalized with multifocal pneumonia (4/20-4/26). He returned to hospital 5/1-5/5 after presenting with cough productive of frothy sputum and chest tightness. CXR with pulmonary edema, proBNP 5800. Echo revealed EF 20% and diastolic dysfunction. Previous EF 35-40% (2007). He was diuresed. Patient is here today for hospital follow up chronic systolic heart failure. He presents to office ambulatory with portable O2. Says he feels good and offers no concerns. Notes stable exertional shortness of breath,  Denies orthopnea, PND, chest pain. Reports stable weight.          Past Medical History:   Diagnosis Date    Arthritis     CAD (coronary artery disease)     Cancer ([address]     Chest pain     COPD (chronic obstructive pulmonary disease) (HCC)     Diabetes mellitus (HCC)     pump patient    Drug use     Hx of crystal meth, cocaine per patient     Emphysema lung ([address]     Fibromyalgia     takes Lyrica for fibromyalgia and diabetic nerve pain    GERD (gastroesophageal reflux disease)     Heart attack ([address] 1991    1st heart attack (MI) 1991, 2nd heart attack (MI) pt does not recall    Hepatitis     Hep A    History of open heart surgery     Tri[facility] Vessel Disease at [address] [facility]    Hyperlipidemia     Hypertension     Peripheral vascular disease ([address]     Precancerous skin lesion     not active; pt sees skin specialist    Sleep apnea      Past Surgical History:   Procedure Laterality Date    ANKLE CLOSED REDUCTION     

## 2019-05-08 ENCOUNTER — TELEPHONE (OUTPATIENT)
Dept: CARDIOLOGY CLINIC | Age: 74
End: 2019-05-08

## 2019-05-08 DIAGNOSIS — I50.22 CHRONIC SYSTOLIC HEART FAILURE (HCC): Primary | ICD-10-CM

## 2019-05-08 NOTE — TELEPHONE ENCOUNTER
Patient contacted and results and instructions to recheck labs next week given.   Verbalized understanding

## 2019-05-08 NOTE — TELEPHONE ENCOUNTER
----- Message from IAN Phelps CNP sent at 5/8/2019  7:59 AM EDT -----  Notify patient labs look good. Continue current medications. Recheck labs in 1 week to monitor kidney numbers. Order placed.

## 2019-05-13 NOTE — DISCHARGE SUMMARY
[facility]    Patient Demographics    Patient. Lynn Russell  Date of Birth. 1945  MRN. [id number]     Primary care provider. [facility]  (Tel: None)    Admit date: 5/1/2019    Discharge date (blank if same as Note Date): 5/5/2019  Note Date: 5/13/2019     Reason for Hospitalization. Chief Complaint   Patient presents with    Shortness of Breath     pt brought in via [facility] , x/o sob worsening this am. Pt has taken ATB recently for PNA. Pt sp02 upon ems arrival was in the 80's          Significant Findings. Active Problems:    Acute respiratory failure (HCC)    Acute on chronic systolic CHF (congestive heart failure) (HCC)    Ischemic cardiomyopathy    Coronary artery disease involving native coronary artery of native heart without angina pectoris    Essential hypertension  Resolved Problems:    * No resolved hospital problems. [facility] Course. Acute respiratory failure with hypoxia, multifactorial, cause include Pneumonia + pulmonary edema + undiagnosed COPD.  - Patient is noncompliant with recommendations from his doctor's at the [facility] especially regarding the inhalers that he use.   - Blood cultures are with no growth to date, pro-calcitonin is mildly elevated  - Blood cultures with no growth, re for up to 5 days came back negative spiratory film by PCR, legionella urine antigen came back negative  - He received IV antibiotics  - Echocardiogram was done and results was reviewed  - Pulmonology  followed     Acute on chronic combined diastolic and systolic congestive heart failure, likely contributing to the above,  -records from the [facility] arrived and there is ECHO result from 2007  - Strict I's and O's, daily weights  - diuresing well with Lasix IV  - TSH with reflex  - cardiology followed     latic acidosis, likely secondary to hypoxia in the setting of pneumonia.      Coronary artery disease, stable, continue on home meds   - on home meds      Stage 3 chronic kidney disease.  - Stable, we monitored his renal function on daily basis.     Diabetes mellitus type 2 on insulin pump, blood sugar was very high initially, it seems that the insulin pump was not connected and the patient boluses was not going into his body, blood sugar is better controlled after the pump was connected by RN. - monitor sugars     Dyslipidemia. -statin       Hypertension, stable, received his home medication. Consults. IP CONSULT TO HOSPITALIST  IP CONSULT TO PULMONOLOGY  IP CONSULT TO DIABETES EDUCATOR  IP CONSULT TO CARDIOLOGY  IP CONSULT TO HEART FAILURE NURSE/COORDINATOR    Physical examination on discharge day. /79   Pulse 58   Temp 97.5 °F (36.4 °C) (Temporal)   Resp 17   Ht 5' 9\" (1.753 m)   Wt 163 lb (73.9 kg)   SpO2 99%   BMI 24.07 kg/m²   General appearance. Alert. Looks comfortable. HEENT. Sclera clear. Moist mucus membranes. Cardiovascular. Regular rate and rhythm, normal S1, S2. No murmur. Respiratory. Not using accessory muscles. Clear to auscultation bilaterally, no wheeze. Gastrointestinal. Abdomen soft, non-tender, not distended, normal bowel sounds  Neurology. Facial symmetry. No speech deficits. Moving all extremities equally. Extremities. No edema in lower extremities. Skin. Warm, dry, normal turgor    Condition at time of discharge stable    Medication instructions provided to patient at discharge.      Medication List      START taking these medications    albuterol sulfate  (90 Base) MCG/ACT inhaler  Inhale 2 puffs into the lungs every 6 hours as needed for Wheezing  Notes to patient:  Use:bronchodilator, to open airways, rescue inhaler  Side effects: nervousness, jitteriness, shakiness, headache, fast heartbeat     furosemide 20 MG tablet  Commonly known as:  LASIX  Take 1 tablet by mouth daily for 10 days  Notes to patient:  Use: treat heart failure, fluid retention, lower blood pressure. Side effects: frequent urination, weakness, muscle cramps, increased sensitivity to light, nausea, and dizziness     losartan 50 MG tablet  Commonly known as:  COZAAR  Take 1 tablet by mouth daily  Notes to patient:  Use:  Lowers blood pressure and takes workload off heart  Side Effects: Dry cough, dizziness, drowsiness, sensitivity to the sun        CONTINUE taking these medications    alprostadil 40 MCG injection  Commonly known as:  EDEX  Notes to patient:  USE: treatment of erectile dysfunction  Side Effects: pain at injection site; lightheadedness; redness, swelling, or curvature of erect penis    AVOID ALCOHOL - may cause SERIOUS reaction     aspirin 81 MG tablet  Notes to patient:  Use: prevents heart attacks and strokes. Side effects: bleeding or bruising more easily, stomach upset.      atorvastatin 40 MG tablet  Commonly known as:  LIPITOR  Notes to patient:  Use: lower cholesterol; prevent heart attack/stroke  Side effects: headache, muscle pain/weakness     Azelastine HCl 137 MCG/SPRAY Soln  Notes to patient:  Use: Treatment of allergic rhinitis  Side effects: Fatigue, headache, rhinorrhea      budesonide-formoterol 160-4.5 MCG/ACT Aero  Commonly known as:  SYMBICORT  Inhale 2 puffs into the lungs 2 times daily  Notes to patient:  Use: to treat asthma or COPD  Side effects: runny nose, sore throat, headache, oral yeast infection     carvedilol 12.5 MG tablet  Commonly known as:  COREG  Take 1 tablet by mouth 2 times daily (with meals)  Notes to patient:  Use: treat heart failure, prevent future heart attacks, lower blood pressure and heart rate, treat chest pain  Side effects: dizziness, fatigue, and diarrhea     dicyclomine 20 MG tablet  Commonly known as:  BENTYL  Notes to patient:  Use: Treatment of irritable bowel syndrome  Side effects: Fatigue, blurred vision, stomach upset     INCRUSE ELLIPTA 62.5 MCG/INH Aepb  Generic drug:  Umeclidinium Bromide  Notes to patient:  Use: to maintain open airways  Side effects: throat irritation, cough, headache, nervousness, shakiness     Insulin Pump Accessories Misc  Notes to patient:  Use: High blood sugar, rapid acting  Side effects: low blood sugar, irritation at injection site     omeprazole 20 MG delayed release capsule  Commonly known as:  PRILOSEC  Notes to patient:  Use: prevention and treatment of gastric ulcers and/or heartburn  Side effects: headache, fatigue, constipation, dry mouth      vitamin D 1000 UNIT Tabs tablet  Commonly known as:  CHOLECALCIFEROL  Notes to patient:  Use: prevention and treatment of low vitamin D  Side effects: muscle weakness/twitching fatigue, constipation, upset stomach        STOP taking these medications    amLODIPine 10 MG tablet  Commonly known as:  NORVASC     PREDNISONE PO     tiotropium 18 MCG inhalation capsule  Commonly known as:  Brando Gaytan        ASK your doctor about these medications    amoxicillin-clavulanate 875-125 MG per tablet  Commonly known as:  AUGMENTIN  Take 1 tablet by mouth 2 times daily for 7 days  Ask about: Should I take this medication? PROBIOTIC ACIDOPHILUS Tabs  Take 1 tablet by mouth daily for 7 days  Ask about: Should I take this medication? Where to Get Your Medications      You can get these medications from any pharmacy    Bring a paper prescription for each of these medications  · albuterol sulfate  (90 Base) MCG/ACT inhaler  · amoxicillin-clavulanate 875-125 MG per tablet  · furosemide 20 MG tablet  · losartan 50 MG tablet  · PROBIOTIC ACIDOPHILUS Tabs         Discharge recommendations given to patient. Follow Up. PCP in 1 week   Disposition. home  Activity. activity as tolerated  Diet: No diet orders on file      Spent 35 minutes in discharge process. Signed:   Diane Gordon MD     5/13/2019 5:27 PM

## 2019-05-25 ENCOUNTER — TELEPHONE (OUTPATIENT)
Dept: CARDIOLOGY CLINIC | Age: 74
End: 2019-05-25

## 2019-05-25 NOTE — TELEPHONE ENCOUNTER
Please call and remind patient he is overdue for labs to monitor kidney numbers unless he recently had them drawn at [facility].

## 2019-05-31 NOTE — TELEPHONE ENCOUNTER
[facility] called to see if we received labs for pt. I told her that I did not, she will be faxing today.

## 2019-06-07 ENCOUNTER — OFFICE VISIT (OUTPATIENT)
Dept: CARDIOLOGY CLINIC | Age: 74
End: 2019-06-07
Payer: MEDICARE

## 2019-06-07 VITALS
HEIGHT: 69 IN | HEART RATE: 78 BPM | BODY MASS INDEX: 23.7 KG/M2 | DIASTOLIC BLOOD PRESSURE: 50 MMHG | WEIGHT: 160 LBS | SYSTOLIC BLOOD PRESSURE: 110 MMHG | OXYGEN SATURATION: 96 %

## 2019-06-07 DIAGNOSIS — I50.22 CHRONIC SYSTOLIC CONGESTIVE HEART FAILURE (HCC): Primary | ICD-10-CM

## 2019-06-07 DIAGNOSIS — I25.10 CORONARY ARTERY DISEASE INVOLVING NATIVE CORONARY ARTERY OF NATIVE HEART WITHOUT ANGINA PECTORIS: ICD-10-CM

## 2019-06-07 DIAGNOSIS — I10 ESSENTIAL HYPERTENSION: ICD-10-CM

## 2019-06-07 DIAGNOSIS — I25.5 ISCHEMIC CARDIOMYOPATHY: ICD-10-CM

## 2019-06-07 PROCEDURE — 1123F ACP DISCUSS/DSCN MKR DOCD: CPT | Performed by: NURSE PRACTITIONER

## 2019-06-07 PROCEDURE — G8420 CALC BMI NORM PARAMETERS: HCPCS | Performed by: NURSE PRACTITIONER

## 2019-06-07 PROCEDURE — G8427 DOCREV CUR MEDS BY ELIG CLIN: HCPCS | Performed by: NURSE PRACTITIONER

## 2019-06-07 PROCEDURE — 99214 OFFICE O/P EST MOD 30 MIN: CPT | Performed by: NURSE PRACTITIONER

## 2019-06-07 PROCEDURE — 4040F PNEUMOC VAC/ADMIN/RCVD: CPT | Performed by: NURSE PRACTITIONER

## 2019-06-07 PROCEDURE — 3017F COLORECTAL CA SCREEN DOC REV: CPT | Performed by: NURSE PRACTITIONER

## 2019-06-07 PROCEDURE — G8598 ASA/ANTIPLAT THER USED: HCPCS | Performed by: NURSE PRACTITIONER

## 2019-06-07 PROCEDURE — 1036F TOBACCO NON-USER: CPT | Performed by: NURSE PRACTITIONER

## 2019-06-07 RX ORDER — FUROSEMIDE 20 MG/1
20 TABLET ORAL 2 TIMES DAILY
COMMUNITY

## 2019-06-07 NOTE — PROGRESS NOTES
[facility] - Cardiology      Chief Complaint: \"I feel good\"    Chief Complaint   Patient presents with    Congestive Heart Failure       History of present illness: Thanh Bernal is a 69 y.o. male with past medical history significant for CAD/CABG x 2, COPD, DM, HTN. He typically follows at [facility]. Patient was recently hospitalized with multifocal pneumonia (4/20-4/26). He returned to hospital 5/1-5/5 after presenting with cough productive of frothy sputum and chest tightness. CXR with pulmonary edema, proBNP 5800. Echo revealed EF 95% and diastolic dysfunction. Previous EF 35-40% (2007). He was diuresed. Patient is here today for follow up chronic systolic heart failure. He presents to office ambulatory with portable O2 although he does not have it on. Says he feels good and offers no concerns today. Denies shortness of breath at rest but he will get winded when he walks. Denies orthopnea, PND. Has noted nonproductive cough for past few days. Associated with sinus congestion, he does have grass allergy. No fever or chills. Patient is unsure of meds he is taking. Labs 5/15 with BUN 20, creatinine 1.16, and potassium 4.3    Ambulated in rock off O2. Low O2 sat 91%.       Past Medical History:   Diagnosis Date    Arthritis     CAD (coronary artery disease)     Cancer ([address]     Chest pain     COPD (chronic obstructive pulmonary disease) (HCC)     Diabetes mellitus (HCC)     pump patient    Drug use     Hx of crystal meth, cocaine per patient     Emphysema lung ([address]     Fibromyalgia     takes Lyrica for fibromyalgia and diabetic nerve pain    GERD (gastroesophageal reflux disease)     Heart attack ([facility] 1991    1st heart attack (MI) 1991, 2nd heart attack (MI) pt does not recall    Hepatitis     Hep A    History of open heart surgery     [facility] [place] ([address])    Hyperlipidemia     Hypertension     [facility]ipheral vascular [facility]     Precancerous skin lesion     not active; pt sees skin specialist    Sleep apnea      Past Surgical History:   Procedure Laterality Date    ANKLE CLOSED REDUCTION      CARDIAC SURGERY      single vessel CABG in     EYE SURGERY      cataracts     Social History     Tobacco Use    Smoking status: Former Smoker     Types: Cigarettes     Last attempt to quit: 2007     Years since quittin.4    Smokeless tobacco: Never Used    Tobacco comment: 3.5 PPD 14 years ago   Substance Use Topics    Alcohol use: No       Review of Systems:   Constitutional: No significant change in weight, + fatigue, no weakness. HEENT: No change in vision or ringing in the ears. Respiratory: + JEFFREY (stable) no PND, orthopnea or cough. Cardiovascular: No chest pain, palpitations, edema. GI: No n/v, abdominal pain or changes in bowel habits. No melena, no hematochezia  : No dysuria or hematuria. Skin: No rash or new skin lesions. Musculoskeletal: No new muscle or joint pain. Neurological: No lightheadedness, dizziness, syncope or TIA-like symptoms. Psychiatric: No anxiety, insomnia or depression    Physical Exam:  BP (!) 110/50   Pulse 78   Ht 5' 9\" (1.753 m)   Wt 160 lb (72.6 kg)   SpO2 96%   BMI 23.63 kg/m²     General:  Awake, alert, oriented in NAD  Skin:  Warm and dry. No unusual bruising or rash  HEENT: Normocephalic and atraumatic. Oral mucosa moist, no cyanosis. Neck:  Supple. No JVP appreciated  Chest:  Normal effort.   Clear to auscultation  Cardiovascular:  RRR, S1/S2, no murmur/gallop/rub  Abdomen:  Soft, nontender, +bowel sounds  Extremities:  No edema  Neurological: No focal deficits  Psychological: Normal mood and affect     Home Medications:  Current Outpatient Medications   Medication Sig Dispense Refill    furosemide (LASIX) 20 MG tablet Take 20 mg by mouth 2 times daily      losartan (COZAAR) 50 MG tablet Take 1 tablet by mouth daily 30 tablet 3    budesonide-formoterol (SYMBICORT) 160-4.5 MCG/ACT AERO Inhale 2 puffs into the lungs 2 times daily 1 Inhaler 3    Azelastine HCl 137 MCG/SPRAY SOLN 1 spray by Nasal route 2 times daily Indications: 1 spray each nare      carvedilol (COREG) 12.5 MG tablet Take 1 tablet by mouth 2 times daily (with meals) 60 tablet 3    omeprazole (PRILOSEC) 20 MG delayed release capsule Take 20 mg by mouth daily      aspirin 81 MG tablet Take 81 mg by mouth daily      vitamin D (CHOLECALCIFEROL) 1000 UNIT TABS tablet Take 1,000 Units by mouth daily      dicyclomine (BENTYL) 20 MG tablet Take 20 mg by mouth every 6 hours      albuterol sulfate  (90 Base) MCG/ACT inhaler Inhale 2 puffs into the lungs every 6 hours as needed for Wheezing 1 Inhaler 3    furosemide (LASIX) 20 MG tablet Take 1 tablet by mouth daily for 10 days 10 tablet 0    Umeclidinium Bromide (INCRUSE ELLIPTA) 62.5 MCG/INH AEPB Inhale 62.5 mcg into the lungs      atorvastatin (LIPITOR) 40 MG tablet Take 20 mg by mouth nightly      alprostadil (EDEX) 40 MCG injection 40 mcg by Intracavitary route as needed for Erectile Dysfunction use no more than 3 times per week      Insulin Pump Accessories MISC by Does not apply route Basal  2537-0082= 1.9 units/hr  7790-5075=2.05 units/hr  7402-0329=1.4 units/hr  2459-0844=1.8 units/hr     Carb ratio 5.5  Sens 22:1  Goal 6572-2513= 100-120           0944-0422= 120-140       No current facility-administered medications for this visit.         Labs:   Lab Results   Component Value Date    WBC 7.3 05/05/2019    HGB 11.8 (L) 05/05/2019    HCT 34.7 (L) 05/05/2019    MCV 95.8 05/05/2019     05/05/2019     Lab Results   Component Value Date     05/07/2019    K 4.8 05/07/2019    K 4.9 05/02/2019     05/07/2019    CO2 27 05/07/2019    BUN 37 05/07/2019    CREATININE 1.4 05/07/2019    GLUCOSE 178 05/07/2019    CALCIUM 9.3 05/07/2019      No results found for: TRIG, HDL, LDLCALC, LDLDIRECT,

## 2019-06-28 ENCOUNTER — TELEPHONE (OUTPATIENT)
Dept: CARDIOLOGY CLINIC | Age: 74
End: 2019-06-28

## 2019-07-03 NOTE — TELEPHONE ENCOUNTER
Will close encounter.   If patient calls about O2 again, will relay [facility] comments; but he was instructed to contact the O2 supply company to see who ordered it per Linh

## 2020-07-15 ENCOUNTER — APPOINTMENT (OUTPATIENT)
Dept: CT IMAGING | Age: 75
End: 2020-07-15
Payer: OTHER GOVERNMENT

## 2020-07-15 ENCOUNTER — HOSPITAL ENCOUNTER (EMERGENCY)
Age: 75
Discharge: HOME | End: 2020-07-16
Attending: EMERGENCY MEDICINE
Payer: OTHER GOVERNMENT

## 2020-07-15 ENCOUNTER — APPOINTMENT (OUTPATIENT)
Dept: GENERAL RADIOLOGY | Age: 75
End: 2020-07-15
Payer: OTHER GOVERNMENT

## 2020-07-15 LAB
A/G RATIO: 1.3 (ref 1.1–2.2)
ALBUMIN SERPL-MCNC: 4.3 G/DL (ref 3.4–5)
ALP BLD-CCNC: 60 U/L (ref 40–129)
ALT SERPL-CCNC: 19 U/L (ref 10–40)
ANION GAP SERPL CALCULATED.3IONS-SCNC: 13 MMOL/L (ref 3–16)
AST SERPL-CCNC: 27 U/L (ref 15–37)
BASOPHILS ABSOLUTE: 0.1 K/UL (ref 0–0.2)
BASOPHILS RELATIVE PERCENT: 0.6 %
BILIRUB SERPL-MCNC: 0.7 MG/DL (ref 0–1)
BILIRUBIN URINE: NEGATIVE
BLOOD, URINE: ABNORMAL
BUN BLDV-MCNC: 18 MG/DL (ref 7–20)
CALCIUM SERPL-MCNC: 9.9 MG/DL (ref 8.3–10.6)
CHLORIDE BLD-SCNC: 104 MMOL/L (ref 99–110)
CLARITY: CLEAR
CO2: 24 MMOL/L (ref 21–32)
COLOR: YELLOW
CREAT SERPL-MCNC: 1.4 MG/DL (ref 0.8–1.3)
EOSINOPHILS ABSOLUTE: 0.2 K/UL (ref 0–0.6)
EOSINOPHILS RELATIVE PERCENT: 1.2 %
EPITHELIAL CELLS, UA: 0 /HPF (ref 0–5)
GFR AFRICAN AMERICAN: >60
GFR NON-AFRICAN AMERICAN: 50
GLOBULIN: 3.4 G/DL
GLUCOSE BLD-MCNC: 236 MG/DL (ref 70–99)
GLUCOSE BLD-MCNC: 285 MG/DL (ref 70–99)
GLUCOSE BLD-MCNC: 50 MG/DL (ref 70–99)
GLUCOSE BLD-MCNC: 56 MG/DL (ref 70–99)
GLUCOSE URINE: 500 MG/DL
HCT VFR BLD CALC: 48.1 % (ref 40.5–52.5)
HEMOGLOBIN: 16.4 G/DL (ref 13.5–17.5)
HYALINE CASTS: 0 /LPF (ref 0–8)
KETONES, URINE: NEGATIVE MG/DL
LACTIC ACID: 1.4 MMOL/L (ref 0.4–2)
LEUKOCYTE ESTERASE, URINE: NEGATIVE
LYMPHOCYTES ABSOLUTE: 1.8 K/UL (ref 1–5.1)
LYMPHOCYTES RELATIVE PERCENT: 13.5 %
MCH RBC QN AUTO: 33.1 PG (ref 26–34)
MCHC RBC AUTO-ENTMCNC: 34.2 G/DL (ref 31–36)
MCV RBC AUTO: 96.9 FL (ref 80–100)
MICROSCOPIC EXAMINATION: YES
MONOCYTES ABSOLUTE: 0.6 K/UL (ref 0–1.3)
MONOCYTES RELATIVE PERCENT: 4.5 %
NEUTROPHILS ABSOLUTE: 10.8 K/UL (ref 1.7–7.7)
NEUTROPHILS RELATIVE PERCENT: 80.2 %
NITRITE, URINE: NEGATIVE
PDW BLD-RTO: 14.3 % (ref 12.4–15.4)
PERFORMED ON: ABNORMAL
PH UA: 6.5 (ref 5–8)
PLATELET # BLD: 138 K/UL (ref 135–450)
PMV BLD AUTO: 10.2 FL (ref 5–10.5)
POTASSIUM REFLEX MAGNESIUM: 3.8 MMOL/L (ref 3.5–5.1)
PROTEIN UA: 100 MG/DL
RBC # BLD: 4.96 M/UL (ref 4.2–5.9)
RBC UA: 6 /HPF (ref 0–4)
SODIUM BLD-SCNC: 141 MMOL/L (ref 136–145)
SPECIFIC GRAVITY UA: 1.01 (ref 1–1.03)
TOTAL PROTEIN: 7.7 G/DL (ref 6.4–8.2)
TROPONIN: <0.01 NG/ML
URINE REFLEX TO CULTURE: ABNORMAL
URINE TYPE: ABNORMAL
UROBILINOGEN, URINE: 0.2 E.U./DL
WBC # BLD: 13.5 K/UL (ref 4–11)
WBC UA: 2 /HPF (ref 0–5)

## 2020-07-15 PROCEDURE — 36415 COLL VENOUS BLD VENIPUNCTURE: CPT

## 2020-07-15 PROCEDURE — 72125 CT NECK SPINE W/O DYE: CPT

## 2020-07-15 PROCEDURE — 2580000003 HC RX 258

## 2020-07-15 PROCEDURE — 80053 COMPREHEN METABOLIC PANEL: CPT

## 2020-07-15 PROCEDURE — 99285 EMERGENCY DEPT VISIT HI MDM: CPT

## 2020-07-15 PROCEDURE — 71045 X-RAY EXAM CHEST 1 VIEW: CPT

## 2020-07-15 PROCEDURE — 70450 CT HEAD/BRAIN W/O DYE: CPT

## 2020-07-15 PROCEDURE — 83605 ASSAY OF LACTIC ACID: CPT

## 2020-07-15 PROCEDURE — 81001 URINALYSIS AUTO W/SCOPE: CPT

## 2020-07-15 PROCEDURE — 85025 COMPLETE CBC W/AUTO DIFF WBC: CPT

## 2020-07-15 PROCEDURE — 84484 ASSAY OF TROPONIN QUANT: CPT

## 2020-07-15 PROCEDURE — 93005 ELECTROCARDIOGRAM TRACING: CPT | Performed by: EMERGENCY MEDICINE

## 2020-07-15 RX ORDER — DEXTROSE MONOHYDRATE 25 G/50ML
INJECTION, SOLUTION INTRAVENOUS
Status: COMPLETED
Start: 2020-07-15 | End: 2020-07-15

## 2020-07-15 RX ORDER — DEXTROSE MONOHYDRATE 25 G/50ML
25 INJECTION, SOLUTION INTRAVENOUS PRN
Status: DISCONTINUED | OUTPATIENT
Start: 2020-07-15 | End: 2020-07-16 | Stop reason: HOSPADM

## 2020-07-15 RX ADMIN — DEXTROSE MONOHYDRATE 25 G: 25 INJECTION, SOLUTION INTRAVENOUS at 21:52

## 2020-07-16 VITALS
SYSTOLIC BLOOD PRESSURE: 144 MMHG | TEMPERATURE: 97.7 F | HEART RATE: 57 BPM | HEIGHT: 70 IN | BODY MASS INDEX: 24.48 KG/M2 | OXYGEN SATURATION: 97 % | RESPIRATION RATE: 18 BRPM | DIASTOLIC BLOOD PRESSURE: 50 MMHG

## 2020-07-16 LAB
EKG ATRIAL RATE: 55 BPM
EKG DIAGNOSIS: NORMAL
EKG P AXIS: 50 DEGREES
EKG P-R INTERVAL: 150 MS
EKG Q-T INTERVAL: 466 MS
EKG QRS DURATION: 112 MS
EKG QTC CALCULATION (BAZETT): 445 MS
EKG R AXIS: 9 DEGREES
EKG T AXIS: 81 DEGREES
EKG VENTRICULAR RATE: 55 BPM
GLUCOSE BLD-MCNC: 306 MG/DL (ref 70–99)
GLUCOSE BLD-MCNC: 376 MG/DL (ref 70–99)
PERFORMED ON: ABNORMAL
PERFORMED ON: ABNORMAL

## 2020-07-16 PROCEDURE — 93010 ELECTROCARDIOGRAM REPORT: CPT | Performed by: INTERNAL MEDICINE

## 2020-07-16 NOTE — ED NOTES
Pt given sandwich, water, crackers to eat. Pt is alert and orientated.       Rosie Srinivasan RN  07/15/20 6400 Pt to meet >75% of estimated nutritional needs during hospital stay.

## 2020-07-16 NOTE — ED NOTES
Bed: 23  Expected date:   Expected time:   Means of arrival: [facility]  Comments:     Elizabeth Magana RN  07/15/20 [id number]

## 2020-07-16 NOTE — ED PROVIDER NOTES
[facility]  eMERGENCY dEPARTMENTeNCOUnter      Pt Name: Marta Tomlin  MRN: [id number]  [place] 1945  Date of evaluation: 7/15/2020  Provider: Himanshu Castano MD    CHIEF COMPLAINT       Chief Complaint   Patient presents with    Seizures     [facility] transported stating that pt had a witnessed seizure that lasted a few mins, when squad arrived pt post itchal with bs of 51, given 15 g oral glucose, pt alert and oriented, no hx of seizures but states that they have adjusting meds. Pt fell during seizure wo complaints of pain. VSS pt states that he tested + for covid last month          HISTORY OF PRESENT ILLNESS   (Location/Symptom, Timing/Onset,Context/Setting, Quality, Duration, Modifying Factors, Severity)  Note limiting factors. Marta Tomlin is a 70 y.o. male who presents to the emergency department for hypoglycemia and seizure. The patient is diabetic. He has an insulin pump and his provider is currently adjusting his medications. He does not have a seizure history. Patient states that he has eaten today. He denies any chest pain no shortness of breath no abdominal pain nausea vomiting or diarrhea. He denies any dysuria. He states he feels fatigued. According to EMS the patient lives with roommates here stated that the patient had had a seizure. And EMS reports that the patient was found to be in a postictal state of a blood glucose of 51 for which she gave 15 g of oral glucose. Patient states that he had COVID-19 test last month which was negative but he had a blood test done about 1 week ago that said it was positive. Patient does not have any symptoms. Nursing notes were reviewed. REVIEW OF SYSTEMS    (2-9 systems for level 4, 10 or more for level 5)     Review of Systems    Positive and pertinent negative as per HPI. Except as noted above in the ROS, all other systems were reviewed and were negative.     PAST MEDICAL HISTORY     Past Medical History:   Diagnosis Date    Arthritis     CAD (coronary artery disease)     [facility]     Chest pain     COPD (chronic obstructive pulmonary disease) ([facility]     Diabetes mellitus ([facility]     pump patient    Drug use     Hx of crystal meth, cocaine per patient     Emphysema lung ([facility]     Fibromyalgia     takes Lyrica for fibromyalgia and diabetic nerve pain    GERD (gastroesophageal reflux disease)     Heart attack ([facility] 1991    1st heart attack (MI) 1991, 2nd heart attack (MI) pt does not recall    Hepatitis     Hep A    History of open heart surgery     [facility] [place] ([facility])    Hyperlipidemia     Hypertension     Peripheral vascular disease ([facility]     Precancerous skin lesion     not active; pt sees skin specialist    Sleep apnea          SURGICALHISTORY       Past Surgical History:   Procedure Laterality Date    ANKLE CLOSED REDUCTION      CARDIAC SURGERY      single vessel CABG in [address] [facility]      cataracts         CURRENT MEDICATIONS       Discharge Medication List as of 7/16/2020  1:27 AM      CONTINUE these medications which have NOT CHANGED    Details   furosemide (LASIX) 20 MG tablet Take 20 mg by mouth 2 times dailyHistorical Med      albuterol sulfate  (90 Base) MCG/ACT inhaler Inhale 2 puffs into the lungs every 6 hours as needed for Wheezing, Disp-1 Inhaler, R-3Print      losartan (COZAAR) 50 MG tablet Take 1 tablet by mouth daily, Disp-30 tablet, R-3Print      Umeclidinium Bromide (INCRUSE ELLIPTA) 62.5 MCG/INH AEPB Inhale 62.5 mcg into the lungsHistorical Med      budesonide-formoterol (SYMBICORT) 160-4.5 MCG/ACT AERO Inhale 2 puffs into the lungs 2 times daily, Disp-1 Inhaler, R-3Normal      atorvastatin (LIPITOR) 40 MG tablet Take 20 mg by mouth nightlyHistorical Med      Azelastine HCl 137 MCG/SPRAY SOLN 1 spray by Nasal route 2 times daily Indications: 1 spray each nareHistorical Med      carvedilol (COREG) 12.5 MG tablet Take 1 tablet by mouth 2 times daily (with meals), Disp-60 tablet, R-3Normal      alprostadil (EDEX) 40 MCG injection 40 mcg by Intracavitary route as needed for Erectile Dysfunction use no more than 3 times per weekHistorical Med      Insulin Pump Accessories MISC Historical MedBasal 8988-4558= 1.9 units/hr 3907-2667=2.05 units/hr 5181-3557=1.4 units/hr 6550-8870=1.8 units/hr   Carb ratio 5.5 Sens 22:1 Goal 2090-5333= 100-120          0459-8821= 120-140      omeprazole (PRILOSEC) 20 MG delayed release capsule Take 20 mg by mouth dailyHistorical Med      aspirin 81 MG tablet Take 81 mg by mouth dailyHistorical Med      vitamin D (CHOLECALCIFEROL) 1000 UNIT TABS tablet Take 1,000 Units by mouth dailyHistorical Med      dicyclomine (BENTYL) 20 MG tablet Take 20 mg by mouth every 6 hoursHistorical Med             ALLERGIES     Gabapentin; Morphine; Morphine and related; and Bupropion    FAMILY HISTORY       Family History   Adopted: Yes          SOCIAL HISTORY       Social History     Socioeconomic History    Marital status:      Spouse name: None    Number of children: None    Years of education: None    Highest education level: None   Occupational History    Occupation: retired     Comment: Heating/Cooling businessman   Social Needs    Financial resource strain: None    Food insecurity     Worry: None     Inability: None    Transportation needs     Medical: None     Non-medical: None   Tobacco Use    Smoking status: Former Smoker     Types: Cigarettes     Last attempt to quit: 2007     Years since quittin.5    Smokeless tobacco: Never Used    Tobacco comment: 3.5 PPD 14 years ago   Substance and Sexual Activity    Alcohol use: No    Drug use: Not Currently     Comment: 32 years ago, uppers, crystal meth    Sexual activity: Not Currently   Lifestyle    Physical activity     Days per week: None     Minutes per session: None    Stress: None   Relationships    Social connections     Talks on phone: None Gets together: None     Attends [employer] service: None     Active member of club or organization: None     Attends meetings of clubs or organizations: None     Relationship status: None    Intimate partner violence     Fear of current or ex partner: None     Emotionally abused: None     Physically abused: None     Forced sexual activity: None   Other Topics Concern    None   Social History Narrative    None       SCREENINGS             PHYSICAL EXAM    (up to 7 for level 4, 8 or more for level 5)     ED Triage Vitals [07/15/20 2141]   BP Temp Temp Source Pulse Resp SpO2 Height Weight   (!) 178/64 97.7 °F (36.5 °C) Oral 59 18 99 % 5' 10\" (1.778 m) --       Physical Exam  Vitals signs and nursing note reviewed. Constitutional:       Appearance: Normal appearance. He is well-developed. He is not ill-appearing. HENT:      Head: Normocephalic and atraumatic. Right Ear: External ear normal.      Left Ear: External ear normal.      Nose: Nose normal.   Eyes:      General: No scleral icterus. Right eye: No discharge. Left eye: No discharge. Conjunctiva/sclera: Conjunctivae normal.      Pupils: Pupils are equal, round, and reactive to light. Neck:      Musculoskeletal: Neck supple. Cardiovascular:      Rate and Rhythm: Normal rate and regular rhythm. Heart sounds: Normal heart sounds. Pulmonary:      Effort: Pulmonary effort is normal. No respiratory distress. Breath sounds: Normal breath sounds. No wheezing or rales. Abdominal:      General: Bowel sounds are normal. There is no distension. Palpations: Abdomen is soft. Tenderness: There is no abdominal tenderness. There is no guarding or rebound. Skin:     Coloration: Skin is not pale. Comments: Ecchymosis to the right forehead   Neurological:      Mental Status: He is alert. Psychiatric:         Mood and Affect: Mood normal.         Behavior: Behavior normal.           DIAGNOSTIC RESULTS     EKG:  All EKG's are interpreted by the Emergency Department Physician who either signs or Co-signs this chart in the absence of a cardiologist.    12 lead EKG shows sinus bradycardia at a rate of 55 bpm, VA interval and QTc normal.  QRS increased to 112 ms. No acute ischemic findings although he does have flattening of his T waves is a very similar to previous EKG May 2019. RADIOLOGY:   Non-plain film images such as CT, Ultrasound and MRI are read by the radiologist. Plain radiographic images are visualized and preliminarily interpreted by the emergency physician with the below findings:        Interpretation per the Radiologist below, if available at the time of this note:    CT Cervical Spine WO Contrast   Final Result   Multilevel degenerative changes with no acute abnormality of the cervical   spine. CT Head WO Contrast   Final Result   No acute intracranial abnormality. XR CHEST PORTABLE   Preliminary Result   No acute process.                ED BEDSIDE ULTRASOUND:   Performed by ED Physician - none    LABS:  Labs Reviewed   CBC WITH AUTO DIFFERENTIAL - Abnormal; Notable for the following components:       Result Value    WBC 13.5 (*)     Neutrophils Absolute 10.8 (*)     All other components within normal limits    Narrative:     Performed at:  [facility] [address], [place], [address] [employer]   Phone [phone]   COMPREHENSIVE METABOLIC PANEL W/ REFLEX TO MG FOR LOW K - Abnormal; Notable for the following components:    Glucose 56 (*)     CREATININE 1.4 (*)     GFR Non- 50 (*)     All other components within normal limits    Narrative:     Performed at:  [facility] [address], [place], [facility] [employer]   Phone [phone]   URINE RT REFLEX TO CULTURE - Abnormal; Notable for the following components:    Glucose, Ur 500 (*)     Blood, Urine SMALL (*)     Protein,  (*)     All other components within normal limits    Narrative:     Performed at:  [facility] [address] [employer], [address] [employer] [address]   Phone [phone]   MICROSCOPIC URINALYSIS - Abnormal; Notable for the following components:    RBC, UA 6 (*)     All other components within normal limits    Narrative:     Performed at:  [facility] [address] [employer],  [place], [address] [employer]   Phone [phone]   POCT GLUCOSE - Abnormal; Notable for the following components:    POC Glucose 50 (*)     All other components within normal limits    Narrative:     Performed at:  [facility] [address] [employer], [address] [employer]   Phone [phone]   POCT GLUCOSE - Abnormal; Notable for the following components:    POC Glucose 236 (*)     All other components within normal limits    Narrative:     Performed at:  [facility] [address],  [place], [address] [employer]   Phone [phone]   POCT GLUCOSE - Abnormal; Notable for the following components:    POC Glucose 285 (*)     All other components within normal limits    Narrative:     Performed at:  [facility] [address] [employer], [employer]   Phone [phone]   POCT GLUCOSE - Abnormal; Notable for the following components:    POC Glucose 306 (*)     All other components within normal limits    Narrative:     Performed at:  [facility] [address],  [place], [address] [employer]   Phone [phone]   POCT GLUCOSE - Abnormal; Notable for the following components:    POC Glucose 376 (*)     All other components within normal limits    Narrative:     Performed at:  [facility] [address] [employer], [employer]   Phone [phone]   TROPONIN    Narrative:     Performed at:  [facility] [address],  [place], [employer]   Phone [phone]   LACTIC ACID, PLASMA    Narrative:     Performed at:  [facility]  [address], [address]   Phone [phone]       All other labs were within normal range or not returned as of this dictation. EMERGENCY DEPARTMENT COURSE and DIFFERENTIAL DIAGNOSIS/MDM:   Vitals:    Vitals:    07/16/20 0015 07/16/20 0030 07/16/20 0045 07/16/20 0115   BP: (!) 132/50 (!) 127/49 (!) 128/47 (!) 144/50   Pulse: 56 54 54 57   Resp: 16 12 16 18   Temp:       TempSrc:       SpO2: 97% 91% 91% 97%   Height:           Elderly male who comes in with hypoglycemia, the patient is diabetic. The patient's chart is reviewed and he does not have a seizure disorder. Patient's possible seizure could be due to hypoglycemia. Laboratory studies are ordered to evaluate the cause of his hypoglycemia including hepatic or renal failure. Also will search for infectious cause including urinalysis and chest x-ray. With regard to the seizure and the subsequent fall I order a CT head and CT cervical spine to evaluate for any trauma. Even after being given 15 g of glucose via EMS the patient's point-of-care glucose here in the emergency room is 50. This is a critical value and the patient is given an amp of D50 and he is fed. Patient remains awake and alert and is able to communicate. Patient is kept in the emergency room under observation and reassessed periodically. The patient remains awake and alert. His blood glucose is trending up. Diagnostic work-up is unremarkable. This patient can be safely discharged home. The exact cause of his hypoglycemia is not known may be due to insulin intake mismatch. Patient is encouraged to place his insulin pump and he does so in the emergency room. I encourage follow-up with his primary care providers for reevaluation and adjustment of his medications as necessary.            CRITICAL CARE TIME   Total Critical Care time was 32 minutes, excluding separately reportable procedures. There was a high probability of clinically significant/life threatening deterioration in the patient's condition which required my urgent intervention. CONSULTS:  None    PROCEDURES:  Unless otherwise noted above, none     Procedures    FINAL IMPRESSION      1.  Hypoglycemia          DISPOSITION/PLAN   DISPOSITION Decision To Discharge 07/16/2020 01:26:32 AM      PATIENT REFERREDTO:  [facility]    Schedule an appointment as soon as possible for a visit         DISCHARGEMEDICATIONS:  Discharge Medication List as of 7/16/2020  1:27 AM             (Please note that portions of this note were completed with a voice recognition program.  Efforts were made to edit the dictations but occasionally words are mis-transcribed.)    Santa Brooke MD (electronically signed)  Attending Emergency Physician        Santa Brooke MD  07/16/20 [address] SINHA MD  07/16/20 9803

## 2020-07-16 NOTE — ED NOTES
Pt d/c in stable condition. Pt verbalized understanding of d/c instructions. Pt assisted to waiting area and d/c with ride home.       Himanshu Soares RN  07/16/20 8388

## 2020-07-17 ENCOUNTER — CARE COORDINATION (OUTPATIENT)
Dept: CARE COORDINATION | Age: 75
End: 2020-07-17

## 2020-07-17 NOTE — CARE COORDINATION
Patient contacted regarding recent discharge and COVID-19 risk. Discussed COVID-19 related testing which was not done at this time. Test results were not done. Patient informed of results, if available? No     Care Transition Nurse/ Ambulatory Care Manager contacted the patient by telephone to perform post discharge assessment. Verified name and  with patient as identifiers. Patient has following risk factors of: heart failure, COPD and diabetes. CTN/ACM reviewed discharge instructions, medical action plan and red flags related to discharge diagnosis. Reviewed and educated them on any new and changed medications related to discharge diagnosis. Advised obtaining a 90-day supply of all daily and as-needed medications. Education provided regarding infection prevention, and signs and symptoms of COVID-19 and when to seek medical attention with patient who verbalized understanding. Discussed exposure protocols and quarantine from [address] you at higher risk for severe illness  and given an opportunity for questions and concerns. The patient agrees to contact the COVID-19 hotline [phone] or PCP office for questions related to their healthcare. CTN/ACM provided contact information for future reference. From CDC: Are you at higher risk for severe illness?  Wash your hands often.  Avoid close contact (6 feet, which is about two arm lengths) with people who are sick.  Put distance between yourself and other people if COVID-19 is spreading in your community.  Clean and disinfect frequently touched surfaces.  Avoid all cruise travel and non-essential air travel.  Call your healthcare professional if you have concerns about COVID-19 and your underlying condition or if you are sick. For more information on steps you can take to protect yourself, see CDC's How to Protect Yourself      Pt reports feeling good, blood sugar has been wnl.  Reviewed covid symptoms and precautions as pt states positive results from another facility. Pt states he was sent home with transportation from hospital think it was lyft and left his dc papers in car found number for lymichele and given to pt to contact regarding his dc paper work. States he has already contacted his pcp for follow up and is awaiting a return call. Plan for follow-up call in 7-14 days based on severity of symptoms and risk factors.

## 2020-07-31 ENCOUNTER — CARE COORDINATION (OUTPATIENT)
Dept: CARE COORDINATION | Age: 75
End: 2020-07-31

## 2020-11-03 PROBLEM — J18.9 PNEUMONIA DUE TO ORGANISM: Status: RESOLVED | Noted: 2020-11-03 | Resolved: 2020-11-03

## 2020-11-03 PROBLEM — J18.9 RIGHT MIDDLE LOBE PNEUMONIA: Status: RESOLVED | Noted: 2018-06-08 | Resolved: 2020-11-03

## 2021-06-02 ENCOUNTER — HOSPITAL ENCOUNTER (OUTPATIENT)
Age: 76
End: 2021-06-02
Payer: OTHER GOVERNMENT

## 2021-06-07 ENCOUNTER — HOSPITAL ENCOUNTER (OUTPATIENT)
Dept: PET IMAGING | Age: 76
Discharge: HOME | End: 2021-06-07
Payer: OTHER GOVERNMENT

## 2021-06-07 VITALS — WEIGHT: 176 LBS | HEIGHT: 69 IN | BODY MASS INDEX: 26.07 KG/M2

## 2021-06-07 DIAGNOSIS — R41.3 MEMORY LOSS: ICD-10-CM

## 2021-06-07 PROCEDURE — A9552 F18 FDG: HCPCS | Performed by: RADIOLOGY

## 2021-06-07 PROCEDURE — 78608 BRAIN IMAGING (PET): CPT

## 2021-06-07 PROCEDURE — 3430000000 HC RX DIAGNOSTIC RADIOPHARMACEUTICAL: Performed by: RADIOLOGY

## 2021-06-07 RX ORDER — FLUDEOXYGLUCOSE F 18 200 MCI/ML
11.61 INJECTION, SOLUTION INTRAVENOUS
Status: COMPLETED | OUTPATIENT
Start: 2021-06-07 | End: 2021-06-07

## 2021-06-07 RX ADMIN — FLUDEOXYGLUCOSE F 18 11.61 MILLICURIE: 200 INJECTION, SOLUTION INTRAVENOUS at 11:16

## 2022-06-06 ENCOUNTER — HOSPITAL ENCOUNTER (OUTPATIENT)
Dept: CT IMAGING | Age: 77
Discharge: HOME | End: 2022-06-06
Payer: OTHER GOVERNMENT

## 2022-06-06 VITALS
DIASTOLIC BLOOD PRESSURE: 72 MMHG | RESPIRATION RATE: 18 BRPM | HEIGHT: 69 IN | SYSTOLIC BLOOD PRESSURE: 94 MMHG | BODY MASS INDEX: 23.99 KG/M2 | WEIGHT: 162 LBS | HEART RATE: 65 BPM | OXYGEN SATURATION: 99 %

## 2022-06-06 DIAGNOSIS — I25.10 ATHEROSCLEROSIS OF NATIVE CORONARY ARTERY WITHOUT ANGINA PECTORIS, UNSPECIFIED WHETHER NATIVE OR TRANSPLANTED HEART: ICD-10-CM

## 2022-06-06 PROCEDURE — 75574 CT ANGIO HRT W/3D IMAGE: CPT

## 2022-06-06 PROCEDURE — 6370000000 HC RX 637 (ALT 250 FOR IP): Performed by: RADIOLOGY

## 2022-06-06 PROCEDURE — 6360000004 HC RX CONTRAST MEDICATION: Performed by: NURSE PRACTITIONER

## 2022-06-06 RX ORDER — NITROGLYCERIN 0.4 MG/1
0.4 TABLET SUBLINGUAL ONCE
Status: COMPLETED | OUTPATIENT
Start: 2022-06-06 | End: 2022-06-06

## 2022-06-06 RX ADMIN — NITROGLYCERIN 0.4 MG: 0.4 TABLET, ORALLY DISINTEGRATING SUBLINGUAL at 08:34

## 2022-06-06 RX ADMIN — IOPAMIDOL 120 ML: 755 INJECTION, SOLUTION INTRAVENOUS at 08:24

## 2022-06-06 ASSESSMENT — PAIN - FUNCTIONAL ASSESSMENT: PAIN_FUNCTIONAL_ASSESSMENT: NONE - DENIES PAIN

## 2022-06-06 NOTE — PROGRESS NOTES
Procedure complete, PIV removed without complication.   BP taken 89/59, pt sitting up drinking water, will monitor

## 2022-06-06 NOTE — PROGRESS NOTES
Pt arrives to pre procedure area in stable condition from home. Vital signs stable. Assessment completed see flow sheet. IV patent. Procedure explained to pt who states understanding and questions answered.

## 2022-07-01 ENCOUNTER — TELEPHONE (OUTPATIENT)
Dept: CASE MANAGEMENT | Age: 77
End: 2022-07-01

## 2022-07-01 NOTE — TELEPHONE ENCOUNTER
Call to [facility]ron [address] [place] [address]-s/w [facility]. Relayed concern on 6/6/22 [facility]. Faxed report to [phone] - attn Dr Nadiya Rojas for f/u.

## 2022-08-07 ENCOUNTER — APPOINTMENT (OUTPATIENT)
Dept: CT IMAGING | Age: 77
DRG: 064 | End: 2022-08-07
Payer: OTHER GOVERNMENT

## 2022-08-07 ENCOUNTER — HOSPITAL ENCOUNTER (INPATIENT)
Age: 77
LOS: 3 days | Discharge: HOSPICE | DRG: 064 | End: 2022-08-10
Attending: EMERGENCY MEDICINE | Admitting: INTERNAL MEDICINE
Payer: OTHER GOVERNMENT

## 2022-08-07 ENCOUNTER — APPOINTMENT (OUTPATIENT)
Dept: GENERAL RADIOLOGY | Age: 77
DRG: 064 | End: 2022-08-07
Payer: OTHER GOVERNMENT

## 2022-08-07 DIAGNOSIS — J90 PLEURAL EFFUSION: ICD-10-CM

## 2022-08-07 DIAGNOSIS — R77.8 ELEVATED TROPONIN: ICD-10-CM

## 2022-08-07 DIAGNOSIS — R93.0 ABNORMAL CT OF THE HEAD: ICD-10-CM

## 2022-08-07 DIAGNOSIS — R41.82 ALTERED MENTAL STATUS, UNSPECIFIED ALTERED MENTAL STATUS TYPE: Primary | ICD-10-CM

## 2022-08-07 DIAGNOSIS — J18.9 PNEUMONIA DUE TO INFECTIOUS ORGANISM, UNSPECIFIED LATERALITY, UNSPECIFIED PART OF LUNG: ICD-10-CM

## 2022-08-07 LAB
ALBUMIN SERPL-MCNC: 3.9 G/DL (ref 3.4–5)
ALP BLD-CCNC: 55 U/L (ref 40–129)
ALT SERPL-CCNC: 17 U/L (ref 10–40)
AMMONIA: 25 UMOL/L (ref 16–60)
AMPHETAMINE SCREEN, URINE: NORMAL
ANION GAP SERPL CALCULATED.3IONS-SCNC: 10 MMOL/L (ref 3–16)
AST SERPL-CCNC: 52 U/L (ref 15–37)
BACTERIA: NORMAL /HPF
BARBITURATE SCREEN URINE: NORMAL
BASE EXCESS VENOUS: -2.9 MMOL/L (ref -3–3)
BASOPHILS ABSOLUTE: 0.1 K/UL (ref 0–0.2)
BASOPHILS RELATIVE PERCENT: 0.9 %
BENZODIAZEPINE SCREEN, URINE: NORMAL
BETA-HYDROXYBUTYRATE: 1 MMOL/L (ref 0–0.27)
BILIRUB SERPL-MCNC: 0.9 MG/DL (ref 0–1)
BILIRUBIN DIRECT: 0.3 MG/DL (ref 0–0.3)
BILIRUBIN URINE: NEGATIVE
BILIRUBIN, INDIRECT: 0.6 MG/DL (ref 0–1)
BLOOD, URINE: ABNORMAL
BUN BLDV-MCNC: 30 MG/DL (ref 7–20)
CALCIUM SERPL-MCNC: 9 MG/DL (ref 8.3–10.6)
CANNABINOID SCREEN URINE: NORMAL
CARBOXYHEMOGLOBIN: 2.5 % (ref 0–1.5)
CHLORIDE BLD-SCNC: 104 MMOL/L (ref 99–110)
CLARITY: CLEAR
CO2: 22 MMOL/L (ref 21–32)
COCAINE METABOLITE SCREEN URINE: NORMAL
COLOR: YELLOW
CREAT SERPL-MCNC: 1.4 MG/DL (ref 0.8–1.3)
EKG ATRIAL RATE: 78 BPM
EKG DIAGNOSIS: NORMAL
EKG P AXIS: 36 DEGREES
EKG P-R INTERVAL: 130 MS
EKG Q-T INTERVAL: 382 MS
EKG QRS DURATION: 102 MS
EKG QTC CALCULATION (BAZETT): 435 MS
EKG R AXIS: 47 DEGREES
EKG T AXIS: 82 DEGREES
EKG VENTRICULAR RATE: 78 BPM
EOSINOPHILS ABSOLUTE: 0.1 K/UL (ref 0–0.6)
EOSINOPHILS RELATIVE PERCENT: 0.6 %
EPITHELIAL CELLS, UA: 0 /HPF (ref 0–5)
ETHANOL: NORMAL MG/DL (ref 0–0.08)
GFR AFRICAN AMERICAN: 60
GFR NON-AFRICAN AMERICAN: 49
GLUCOSE BLD-MCNC: 307 MG/DL (ref 70–99)
GLUCOSE BLD-MCNC: 323 MG/DL (ref 70–99)
GLUCOSE BLD-MCNC: 359 MG/DL (ref 70–99)
GLUCOSE BLD-MCNC: 413 MG/DL (ref 70–99)
GLUCOSE URINE: 500 MG/DL
HCO3 VENOUS: 20.9 MMOL/L (ref 23–29)
HCT VFR BLD CALC: 42 % (ref 40.5–52.5)
HEMOGLOBIN, VEN, REDUCED: 4 %
HEMOGLOBIN: 14 G/DL (ref 13.5–17.5)
HYALINE CASTS: 1 /LPF (ref 0–8)
INR BLD: 1.44 (ref 0.87–1.14)
KETONES, URINE: 15 MG/DL
LACTIC ACID, SEPSIS: 1.3 MMOL/L (ref 0.4–1.9)
LEUKOCYTE ESTERASE, URINE: NEGATIVE
LYMPHOCYTES ABSOLUTE: 1.1 K/UL (ref 1–5.1)
LYMPHOCYTES RELATIVE PERCENT: 8.8 %
Lab: NORMAL
MCH RBC QN AUTO: 33.3 PG (ref 26–34)
MCHC RBC AUTO-ENTMCNC: 33.3 G/DL (ref 31–36)
MCV RBC AUTO: 100 FL (ref 80–100)
METHADONE SCREEN, URINE: NORMAL
METHEMOGLOBIN VENOUS: 0.2 %
MICROSCOPIC EXAMINATION: YES
MONOCYTES ABSOLUTE: 0.8 K/UL (ref 0–1.3)
MONOCYTES RELATIVE PERCENT: 6.3 %
NEUTROPHILS ABSOLUTE: 10.3 K/UL (ref 1.7–7.7)
NEUTROPHILS RELATIVE PERCENT: 83.4 %
NITRITE, URINE: NEGATIVE
O2 CONTENT, VEN: 20 VOL %
O2 SAT, VEN: 96 %
O2 THERAPY: ABNORMAL
OPIATE SCREEN URINE: NORMAL
OXYCODONE URINE: NORMAL
PCO2, VEN: 32.8 MMHG (ref 40–50)
PDW BLD-RTO: 13.8 % (ref 12.4–15.4)
PERFORMED ON: ABNORMAL
PH UA: 6.5
PH UA: 6.5 (ref 5–8)
PH VENOUS: 7.41 (ref 7.35–7.45)
PHENCYCLIDINE SCREEN URINE: NORMAL
PLATELET # BLD: 96 K/UL (ref 135–450)
PLATELET SLIDE REVIEW: ABNORMAL
PMV BLD AUTO: 9.3 FL (ref 5–10.5)
PO2, VEN: 78.6 MMHG (ref 25–40)
POTASSIUM SERPL-SCNC: 4.7 MMOL/L (ref 3.5–5.1)
PRO-BNP: 1786 PG/ML (ref 0–449)
PROCALCITONIN: 0.98 NG/ML (ref 0–0.15)
PROPOXYPHENE SCREEN: NORMAL
PROTEIN UA: 30 MG/DL
PROTHROMBIN TIME: 17.5 SEC (ref 11.7–14.5)
RBC # BLD: 4.2 M/UL (ref 4.2–5.9)
RBC # BLD: NORMAL 10*6/UL
RBC UA: 3 /HPF (ref 0–4)
SLIDE REVIEW: ABNORMAL
SODIUM BLD-SCNC: 136 MMOL/L (ref 136–145)
SPECIFIC GRAVITY UA: >=1.03 (ref 1–1.03)
TCO2 CALC VENOUS: 49 MMOL/L
TOTAL CK: 716 U/L (ref 39–308)
TOTAL PROTEIN: 6.8 G/DL (ref 6.4–8.2)
TROPONIN: 0.32 NG/ML
TROPONIN: 0.43 NG/ML
TROPONIN: 0.74 NG/ML
URINE REFLEX TO CULTURE: ABNORMAL
URINE TYPE: ABNORMAL
UROBILINOGEN, URINE: 1 E.U./DL
WBC # BLD: 12.4 K/UL (ref 4–11)
WBC UA: 0 /HPF (ref 0–5)

## 2022-08-07 PROCEDURE — 83036 HEMOGLOBIN GLYCOSYLATED A1C: CPT

## 2022-08-07 PROCEDURE — 94640 AIRWAY INHALATION TREATMENT: CPT

## 2022-08-07 PROCEDURE — 82803 BLOOD GASES ANY COMBINATION: CPT

## 2022-08-07 PROCEDURE — 99285 EMERGENCY DEPT VISIT HI MDM: CPT

## 2022-08-07 PROCEDURE — 82140 ASSAY OF AMMONIA: CPT

## 2022-08-07 PROCEDURE — 6370000000 HC RX 637 (ALT 250 FOR IP): Performed by: PHYSICIAN ASSISTANT

## 2022-08-07 PROCEDURE — 2580000003 HC RX 258: Performed by: INTERNAL MEDICINE

## 2022-08-07 PROCEDURE — 2580000003 HC RX 258: Performed by: PHYSICIAN ASSISTANT

## 2022-08-07 PROCEDURE — 83880 ASSAY OF NATRIURETIC PEPTIDE: CPT

## 2022-08-07 PROCEDURE — 2700000000 HC OXYGEN THERAPY PER DAY

## 2022-08-07 PROCEDURE — 96375 TX/PRO/DX INJ NEW DRUG ADDON: CPT

## 2022-08-07 PROCEDURE — 82010 KETONE BODYS QUAN: CPT

## 2022-08-07 PROCEDURE — 85025 COMPLETE CBC W/AUTO DIFF WBC: CPT

## 2022-08-07 PROCEDURE — 82077 ASSAY SPEC XCP UR&BREATH IA: CPT

## 2022-08-07 PROCEDURE — 83605 ASSAY OF LACTIC ACID: CPT

## 2022-08-07 PROCEDURE — 94761 N-INVAS EAR/PLS OXIMETRY MLT: CPT

## 2022-08-07 PROCEDURE — 73610 X-RAY EXAM OF ANKLE: CPT

## 2022-08-07 PROCEDURE — 71045 X-RAY EXAM CHEST 1 VIEW: CPT

## 2022-08-07 PROCEDURE — 80048 BASIC METABOLIC PNL TOTAL CA: CPT

## 2022-08-07 PROCEDURE — 82550 ASSAY OF CK (CPK): CPT

## 2022-08-07 PROCEDURE — 70450 CT HEAD/BRAIN W/O DYE: CPT

## 2022-08-07 PROCEDURE — 96365 THER/PROPH/DIAG IV INF INIT: CPT

## 2022-08-07 PROCEDURE — 93005 ELECTROCARDIOGRAM TRACING: CPT | Performed by: EMERGENCY MEDICINE

## 2022-08-07 PROCEDURE — 2580000003 HC RX 258: Performed by: NURSE PRACTITIONER

## 2022-08-07 PROCEDURE — 6360000002 HC RX W HCPCS: Performed by: INTERNAL MEDICINE

## 2022-08-07 PROCEDURE — 80307 DRUG TEST PRSMV CHEM ANLYZR: CPT

## 2022-08-07 PROCEDURE — 2500000003 HC RX 250 WO HCPCS: Performed by: NURSE PRACTITIONER

## 2022-08-07 PROCEDURE — 84484 ASSAY OF TROPONIN QUANT: CPT

## 2022-08-07 PROCEDURE — 6360000004 HC RX CONTRAST MEDICATION: Performed by: PHYSICIAN ASSISTANT

## 2022-08-07 PROCEDURE — 85610 PROTHROMBIN TIME: CPT

## 2022-08-07 PROCEDURE — 2060000000 HC ICU INTERMEDIATE R&B

## 2022-08-07 PROCEDURE — 93005 ELECTROCARDIOGRAM TRACING: CPT | Performed by: INTERNAL MEDICINE

## 2022-08-07 PROCEDURE — 36415 COLL VENOUS BLD VENIPUNCTURE: CPT

## 2022-08-07 PROCEDURE — 81001 URINALYSIS AUTO W/SCOPE: CPT

## 2022-08-07 PROCEDURE — 6370000000 HC RX 637 (ALT 250 FOR IP): Performed by: INTERNAL MEDICINE

## 2022-08-07 PROCEDURE — 71260 CT THORAX DX C+: CPT | Performed by: PHYSICIAN ASSISTANT

## 2022-08-07 PROCEDURE — 93010 ELECTROCARDIOGRAM REPORT: CPT | Performed by: INTERNAL MEDICINE

## 2022-08-07 PROCEDURE — 6360000002 HC RX W HCPCS: Performed by: PHYSICIAN ASSISTANT

## 2022-08-07 PROCEDURE — 72125 CT NECK SPINE W/O DYE: CPT

## 2022-08-07 PROCEDURE — 84145 PROCALCITONIN (PCT): CPT

## 2022-08-07 PROCEDURE — 80076 HEPATIC FUNCTION PANEL: CPT

## 2022-08-07 RX ORDER — ACETAMINOPHEN 325 MG/1
650 TABLET ORAL EVERY 6 HOURS PRN
Status: DISCONTINUED | OUTPATIENT
Start: 2022-08-07 | End: 2022-08-10 | Stop reason: HOSPADM

## 2022-08-07 RX ORDER — DILTIAZEM HYDROCHLORIDE 5 MG/ML
10 INJECTION INTRAVENOUS ONCE
Status: COMPLETED | OUTPATIENT
Start: 2022-08-07 | End: 2022-08-07

## 2022-08-07 RX ORDER — ONDANSETRON 2 MG/ML
4 INJECTION INTRAMUSCULAR; INTRAVENOUS EVERY 6 HOURS PRN
Status: DISCONTINUED | OUTPATIENT
Start: 2022-08-07 | End: 2022-08-10 | Stop reason: HOSPADM

## 2022-08-07 RX ORDER — AZELASTINE 1 MG/ML
1 SPRAY, METERED NASAL 2 TIMES DAILY
Status: DISCONTINUED | OUTPATIENT
Start: 2022-08-07 | End: 2022-08-10 | Stop reason: HOSPADM

## 2022-08-07 RX ORDER — CLOPIDOGREL BISULFATE 75 MG/1
75 TABLET ORAL DAILY
Status: DISCONTINUED | OUTPATIENT
Start: 2022-08-07 | End: 2022-08-10 | Stop reason: HOSPADM

## 2022-08-07 RX ORDER — ONDANSETRON 2 MG/ML
4 INJECTION INTRAMUSCULAR; INTRAVENOUS EVERY 6 HOURS PRN
Status: DISCONTINUED | OUTPATIENT
Start: 2022-08-07 | End: 2022-08-07

## 2022-08-07 RX ORDER — SODIUM CHLORIDE 0.9 % (FLUSH) 0.9 %
5-40 SYRINGE (ML) INJECTION PRN
Status: DISCONTINUED | OUTPATIENT
Start: 2022-08-07 | End: 2022-08-10 | Stop reason: HOSPADM

## 2022-08-07 RX ORDER — ONDANSETRON 4 MG/1
4 TABLET, ORALLY DISINTEGRATING ORAL EVERY 8 HOURS PRN
Status: DISCONTINUED | OUTPATIENT
Start: 2022-08-07 | End: 2022-08-10 | Stop reason: HOSPADM

## 2022-08-07 RX ORDER — ATORVASTATIN CALCIUM 10 MG/1
20 TABLET, FILM COATED ORAL NIGHTLY
Status: DISCONTINUED | OUTPATIENT
Start: 2022-08-07 | End: 2022-08-07

## 2022-08-07 RX ORDER — ATORVASTATIN CALCIUM 80 MG/1
80 TABLET, FILM COATED ORAL NIGHTLY
Status: DISCONTINUED | OUTPATIENT
Start: 2022-08-07 | End: 2022-08-10 | Stop reason: HOSPADM

## 2022-08-07 RX ORDER — SODIUM CHLORIDE 0.9 % (FLUSH) 0.9 %
5-40 SYRINGE (ML) INJECTION EVERY 12 HOURS SCHEDULED
Status: DISCONTINUED | OUTPATIENT
Start: 2022-08-07 | End: 2022-08-10 | Stop reason: HOSPADM

## 2022-08-07 RX ORDER — ASPIRIN 81 MG/1
324 TABLET, CHEWABLE ORAL ONCE
Status: COMPLETED | OUTPATIENT
Start: 2022-08-07 | End: 2022-08-07

## 2022-08-07 RX ORDER — ENOXAPARIN SODIUM 100 MG/ML
40 INJECTION SUBCUTANEOUS DAILY
Status: DISCONTINUED | OUTPATIENT
Start: 2022-08-07 | End: 2022-08-10 | Stop reason: HOSPADM

## 2022-08-07 RX ORDER — INSULIN GLARGINE 100 [IU]/ML
25 INJECTION, SOLUTION SUBCUTANEOUS 2 TIMES DAILY
Status: DISCONTINUED | OUTPATIENT
Start: 2022-08-07 | End: 2022-08-08

## 2022-08-07 RX ORDER — SODIUM CHLORIDE 9 MG/ML
INJECTION, SOLUTION INTRAVENOUS CONTINUOUS
Status: DISCONTINUED | OUTPATIENT
Start: 2022-08-07 | End: 2022-08-08

## 2022-08-07 RX ORDER — CARVEDILOL 3.12 MG/1
12.5 TABLET ORAL 2 TIMES DAILY WITH MEALS
Status: DISCONTINUED | OUTPATIENT
Start: 2022-08-07 | End: 2022-08-10 | Stop reason: HOSPADM

## 2022-08-07 RX ORDER — 0.9 % SODIUM CHLORIDE 0.9 %
500 INTRAVENOUS SOLUTION INTRAVENOUS ONCE
Status: COMPLETED | OUTPATIENT
Start: 2022-08-07 | End: 2022-08-08

## 2022-08-07 RX ORDER — INSULIN LISPRO 100 [IU]/ML
0-4 INJECTION, SOLUTION INTRAVENOUS; SUBCUTANEOUS NIGHTLY
Status: DISCONTINUED | OUTPATIENT
Start: 2022-08-07 | End: 2022-08-10 | Stop reason: HOSPADM

## 2022-08-07 RX ORDER — LOSARTAN POTASSIUM 25 MG/1
50 TABLET ORAL DAILY
Status: DISCONTINUED | OUTPATIENT
Start: 2022-08-07 | End: 2022-08-10 | Stop reason: HOSPADM

## 2022-08-07 RX ORDER — ASPIRIN 81 MG/1
81 TABLET, CHEWABLE ORAL DAILY
Status: DISCONTINUED | OUTPATIENT
Start: 2022-08-07 | End: 2022-08-10 | Stop reason: HOSPADM

## 2022-08-07 RX ORDER — PANTOPRAZOLE SODIUM 40 MG/1
40 TABLET, DELAYED RELEASE ORAL
Status: DISCONTINUED | OUTPATIENT
Start: 2022-08-08 | End: 2022-08-10 | Stop reason: HOSPADM

## 2022-08-07 RX ORDER — DICYCLOMINE HYDROCHLORIDE 10 MG/1
20 CAPSULE ORAL EVERY 6 HOURS
Status: DISCONTINUED | OUTPATIENT
Start: 2022-08-07 | End: 2022-08-10 | Stop reason: HOSPADM

## 2022-08-07 RX ORDER — SODIUM CHLORIDE 9 MG/ML
INJECTION, SOLUTION INTRAVENOUS PRN
Status: DISCONTINUED | OUTPATIENT
Start: 2022-08-07 | End: 2022-08-10 | Stop reason: HOSPADM

## 2022-08-07 RX ORDER — VITAMIN B COMPLEX
1000 TABLET ORAL DAILY
Status: DISCONTINUED | OUTPATIENT
Start: 2022-08-07 | End: 2022-08-10 | Stop reason: HOSPADM

## 2022-08-07 RX ORDER — ACETAMINOPHEN 650 MG/1
650 SUPPOSITORY RECTAL EVERY 6 HOURS PRN
Status: DISCONTINUED | OUTPATIENT
Start: 2022-08-07 | End: 2022-08-10 | Stop reason: HOSPADM

## 2022-08-07 RX ORDER — DEXTROSE MONOHYDRATE 100 MG/ML
INJECTION, SOLUTION INTRAVENOUS CONTINUOUS PRN
Status: DISCONTINUED | OUTPATIENT
Start: 2022-08-07 | End: 2022-08-10 | Stop reason: HOSPADM

## 2022-08-07 RX ORDER — ALBUTEROL SULFATE 90 UG/1
2 AEROSOL, METERED RESPIRATORY (INHALATION) EVERY 6 HOURS PRN
Status: DISCONTINUED | OUTPATIENT
Start: 2022-08-07 | End: 2022-08-10 | Stop reason: HOSPADM

## 2022-08-07 RX ORDER — POLYETHYLENE GLYCOL 3350 17 G/17G
17 POWDER, FOR SOLUTION ORAL DAILY PRN
Status: DISCONTINUED | OUTPATIENT
Start: 2022-08-07 | End: 2022-08-10 | Stop reason: HOSPADM

## 2022-08-07 RX ORDER — ONDANSETRON 4 MG/1
4 TABLET, ORALLY DISINTEGRATING ORAL EVERY 8 HOURS PRN
Status: DISCONTINUED | OUTPATIENT
Start: 2022-08-07 | End: 2022-08-07

## 2022-08-07 RX ORDER — NITROGLYCERIN 0.4 MG/1
0.4 TABLET SUBLINGUAL EVERY 5 MIN PRN
Status: DISCONTINUED | OUTPATIENT
Start: 2022-08-07 | End: 2022-08-10 | Stop reason: HOSPADM

## 2022-08-07 RX ORDER — INSULIN LISPRO 100 [IU]/ML
5 INJECTION, SOLUTION INTRAVENOUS; SUBCUTANEOUS
Status: DISCONTINUED | OUTPATIENT
Start: 2022-08-07 | End: 2022-08-08

## 2022-08-07 RX ORDER — INSULIN LISPRO 100 [IU]/ML
0-8 INJECTION, SOLUTION INTRAVENOUS; SUBCUTANEOUS
Status: DISCONTINUED | OUTPATIENT
Start: 2022-08-07 | End: 2022-08-08 | Stop reason: DRUGHIGH

## 2022-08-07 RX ADMIN — Medication 2 PUFF: at 21:35

## 2022-08-07 RX ADMIN — ASPIRIN 324 MG: 81 TABLET, CHEWABLE ORAL at 12:48

## 2022-08-07 RX ADMIN — INSULIN LISPRO 5 UNITS: 100 INJECTION, SOLUTION INTRAVENOUS; SUBCUTANEOUS at 17:56

## 2022-08-07 RX ADMIN — Medication 10 ML: at 22:21

## 2022-08-07 RX ADMIN — SODIUM CHLORIDE 3000 MG: 900 INJECTION INTRAVENOUS at 21:53

## 2022-08-07 RX ADMIN — INSULIN LISPRO 8 UNITS: 100 INJECTION, SOLUTION INTRAVENOUS; SUBCUTANEOUS at 17:56

## 2022-08-07 RX ADMIN — CLOPIDOGREL BISULFATE 75 MG: 75 TABLET ORAL at 21:54

## 2022-08-07 RX ADMIN — INSULIN LISPRO 4 UNITS: 100 INJECTION, SOLUTION INTRAVENOUS; SUBCUTANEOUS at 21:31

## 2022-08-07 RX ADMIN — ENOXAPARIN SODIUM 40 MG: 100 INJECTION SUBCUTANEOUS at 21:54

## 2022-08-07 RX ADMIN — Medication 1000 MG: at 12:48

## 2022-08-07 RX ADMIN — DICYCLOMINE HYDROCHLORIDE 20 MG: 10 CAPSULE ORAL at 21:30

## 2022-08-07 RX ADMIN — AZITHROMYCIN MONOHYDRATE 500 MG: 500 INJECTION, POWDER, LYOPHILIZED, FOR SOLUTION INTRAVENOUS at 12:57

## 2022-08-07 RX ADMIN — DILTIAZEM HYDROCHLORIDE 5 MG/HR: 5 INJECTION, SOLUTION INTRAVENOUS at 23:00

## 2022-08-07 RX ADMIN — SODIUM CHLORIDE: 9 INJECTION, SOLUTION INTRAVENOUS at 17:09

## 2022-08-07 RX ADMIN — SODIUM CHLORIDE: 9 INJECTION, SOLUTION INTRAVENOUS at 23:12

## 2022-08-07 RX ADMIN — ATORVASTATIN CALCIUM 80 MG: 80 TABLET, FILM COATED ORAL at 21:30

## 2022-08-07 RX ADMIN — DILTIAZEM HYDROCHLORIDE 10 MG: 5 INJECTION INTRAVENOUS at 22:53

## 2022-08-07 RX ADMIN — IOPAMIDOL 75 ML: 755 INJECTION, SOLUTION INTRAVENOUS at 11:51

## 2022-08-07 RX ADMIN — TIOTROPIUM BROMIDE INHALATION SPRAY 2 PUFF: 3.12 SPRAY, METERED RESPIRATORY (INHALATION) at 17:55

## 2022-08-07 RX ADMIN — SODIUM CHLORIDE 500 ML: 9 INJECTION, SOLUTION INTRAVENOUS at 23:15

## 2022-08-07 RX ADMIN — INSULIN GLARGINE 25 UNITS: 100 INJECTION, SOLUTION SUBCUTANEOUS at 21:30

## 2022-08-07 ASSESSMENT — PAIN DESCRIPTION - LOCATION
LOCATION: BACK
LOCATION: BACK
LOCATION: ABDOMEN

## 2022-08-07 ASSESSMENT — PAIN SCALES - GENERAL
PAINLEVEL_OUTOF10: 0
PAINLEVEL_OUTOF10: 3

## 2022-08-07 ASSESSMENT — PAIN - FUNCTIONAL ASSESSMENT: PAIN_FUNCTIONAL_ASSESSMENT: 0-10

## 2022-08-07 ASSESSMENT — LIFESTYLE VARIABLES: HOW OFTEN DO YOU HAVE A DRINK CONTAINING ALCOHOL: NEVER

## 2022-08-07 NOTE — ED NOTES
Pts insulin pump disconnected and suspended administration by this RN following policy due to patient being altered and unable to assess and care for pump on own. Insulin pump handed directly to friend in room at bedside at this time.       Eloina Agustin RN  08/07/22 5064

## 2022-08-07 NOTE — ED PROVIDER NOTES
This patient was seen by the Mid-Level Provider. I have seen and examined the patient, agree with the workup, evaluation, management and diagnosis. Care plan has been discussed. My assessment reveals a 57-year-old male who presents with confusion. The patient presents from home with increased confusion for the last several days per a friend in the room. He arrives by EMS. According to a friend of the patient he has also been having troubles with his blood sugar for the last several days. The patient denies any chest pain or shortness of breath. Patient has history of known cardiac disease and is followed by the [facility].          Radiology results:    CT CHEST PULMONARY EMBOLISM W CONTRAST   Final Result   No findings to suggest large central pulmonary embolism as discussed above. Large layering left pleural effusion. Partial atelectasis of the left lower lobe. Additional asymmetric   heterogeneous left-sided opacity can reflect atelectasis, pneumonia, or   asymmetric pulmonary edema. Indeterminate 2.5 cm lingular pulmonary nodule versus rounded atelectasis. Mediastinal and hilar adenopathy is also seen. Chest CT follow-up after   treatment and resolution of acute symptoms is recommended as malignancy   cannot be excluded. Sclerosis of the lateral right 6th rib and anterior left 3rd rib which could   be due to healing fractures or metastases. CT CERVICAL SPINE WO CONTRAST   Final Result   No acute abnormality of the cervical spine. Multilevel degenerative disc disease and facet arthropathy as outlined above. Left pleural effusion. See CT of the chest of the same date for complete   details. CT HEAD WO CONTRAST   Preliminary Result   Focal areas of abnormal low attenuation involving left frontal, left   parietal, right occipital, and left cerebellar regions. Findings are   suspicious representing foci of acute/recent ischemic change.   Multiplicity   and bilaterality raise possibility of changes associated with underlying   embolic phenomenon. Follow-up MRI examination with diffusion imaging is   recommended for more complete evaluation. Critical results were called by Dr. Kevin Delgado. Bernice Hall MD to PALMA Pham on 8/7/2022 at 12:18. XR ANKLE RIGHT (MIN 3 VIEWS)   Final Result   No acute osseous abnormality of the right ankle. XR CHEST PORTABLE   Final Result   Central vascular congestion with perihilar edema consistent with CHF. Left   pleural effusion with dependent opacification, possibly atelectasis or   infiltrate.                LABS:    Labs Reviewed   BASIC METABOLIC PANEL - Abnormal; Notable for the following components:       Result Value    Glucose 323 (*)     BUN 30 (*)     Creatinine 1.4 (*)     GFR Non- 49 (*)     GFR  60 (*)     All other components within normal limits    Narrative:     Jelani Araya  [facility] tel. [phone],  Chemistry results called to and read back by Milton Siu, 08/07/2022  11:42, by Didi Torre - Abnormal; Notable for the following components:    Pro-BNP 1,786 (*)     All other components within normal limits    Narrative:     Jelani Araya  [facility] tel. [phone],  Chemistry results called to and read back by Milton Siu, 08/07/2022  11:42, by [facility]   CBC WITH AUTO DIFFERENTIAL - Abnormal; Notable for the following components:    WBC 12.4 (*)     Platelets 96 (*)     Neutrophils Absolute 10.3 (*)     All other components within normal limits   TROPONIN - Abnormal; Notable for the following components:    Troponin 0.74 (*)     All other components within normal limits    Narrative:     DUSTIN Arias  [facility] tel. [phone],  Chemistry results [facility], 08/07/2022  11:42, by [facility]   BLOOD GAS, VENOUS - Abnormal; Notable for the following components:    pCO2, Mk 32.8 (*)     pO2, Mk 78.6 (*)     HCO3, Venous 20.9 (*)     Carboxyhemoglobin 2.5 (*)     All other components within normal limits   BETA-HYDROXYBUTYRATE - Abnormal; Notable for the following components:    Beta-Hydroxybutyrate 1.00 (*)     All other components within normal limits    Narrative:     CALL  [facility] [phone],  Chemistry results called to and read back by Benson Benites, 08/07/2022  11:42, by [address] [place] [address] - Abnormal; Notable for the following components:    Glucose, Ur 500 (*)     Ketones, Urine 15 (*)     Blood, Urine SMALL (*)     Protein, UA 30 (*)     All other components within normal limits   PROTIME-INR - Abnormal; Notable for the following components:    Protime 17.5 (*)     INR 1.44 (*)     All other components within normal limits   CK - Abnormal; Notable for the following components:     Total  (*)     All other components within normal limits    Narrative:     Myesha Beaver  [facility] tel. [phone],  Chemistry results called to and read back by Benson Benites, 08/07/2022  11:42, by [address] [facility] [place] - Abnormal; Notable for the following components:    AST 52 (*)     All other components within normal limits    Narrative:     Myesha Beaver  [facility] tel. [phone],  Chemistry results called to and read back by Benson Benites, 08/07/2022  11:42, by WILFRID   PROCALCITONIN - Abnormal; Notable for the following components:    Procalcitonin 0.98 (*)     All other components within normal limits   POCT GLUCOSE - Abnormal; Notable for the following components:    POC Glucose 307 (*)     All other components within normal limits   AMMONIA   ETHANOL    Narrative:     CALL  Hugo  [facility] tel. [phone],  Chemistry results called to and read back by Benson Benites, 08/07/2022  11:42, by CASMI   LACTATE, [address] [facility]           EKG:    Sinus rhythm at a rate of 78 beats a minute with no acute ST elevations or depressions or pathologic Q waves. Normal axis. Exam:    Well-nourished male in no acute distress. The patient was mildly confused patient is alert and oriented on my examination. He had no focal motor or sensory deficits throughout. Medical decision makin-year-old male presents with increased confusion. The patient's work-up was extensive today including a troponin that came back significantly elevated. However, the patient's EKG is nonischemic and nondiagnostic. He did have some Q waves in his inferior leads. Given the significantly elevated troponin and confusion he will be admitted for further care and cardiology consultation. Finally, his work-up also shows a possible pneumonia he was treated for such. The patient CT of his head did show some possible ischemic findings and a CT of the chest showed a large pleural effusion and possibly underlying pneumonia. Critical care time: 45 minutes of critical care time spent with this patient. FINAL IMPRESSION:    1. Altered mental status, unspecified altered mental status type    2. Pneumonia due to infectious organism, unspecified laterality, unspecified part of lung    3. Pleural effusion    4. Abnormal CT of the head    5.  Elevated troponin            Glen Diego MD  22 0022

## 2022-08-07 NOTE — PROGRESS NOTES
08/07/22 1805   RT Protocol   History Pulmonary Disease 2   Respiratory pattern 0   Breath sounds 0   Cough 0   Bronchodilator Assessment Score 2

## 2022-08-07 NOTE — ED PROVIDER NOTES
[address] [facility]        Pt Name: Eamon Heredia  MRN: [id number]  [place] 1945  Date of evaluation: 8/7/2022  Provider: PALMA Franco  PCP: [facility]  Note Started: 4:56 PM EDT        I have seen and evaluated this patient with my supervising physician Gadiel       Chief Complaint   Patient presents with    Altered Mental Status     Pt with AMS x few days per family, in by EMS, pt alert to self and place, disorientated to situation or time, pt inuslin pump per EMS pt has been having trouble, bs 200s       HISTORY OF PRESENT ILLNESS   (Location, Timing/Onset, Context/Setting, Quality, Duration, Modifying Factors, Severity, Associated Signs and Symptoms)  Note limiting factors. Chief Complaint: Polo Hannon is a 78 y.o. male with past medical history of previous CVA, CAD, COPD, diabetes, documented drug usage but clean per patient, emphysema, hyperlipidemia, hypertension and peripheral vascular disease who presents to the ED with complaint of altered mental status. Apparently has had change in his mental status for the past couple days. Friend is at bedside and able to correlate history. Apparently went to see patient yesterday and apparently he was confused and altered. Apparently according to family it persisted since Thursday. Patient has an insulin pump but apparently been having issues with getting it to work so does not have it in place at this time. Patient appears confused at this time and relatively poor historian secondary to confusion. He denies any specific complaints at this time. He denies headache, visual changes, speech disturbances, numbness/tingling or lightheadedness/dizziness. Denies chest pain, shortness of breath, abdominal pain, nausea/vomiting, urinary symptoms or changes in bowel movements. No fever or chills per family or friend.     Nursing Notes were all reviewed and agreed with or any disagreements were addressed in the HPI. REVIEW OF SYSTEMS    (2-9 systems for level 4, 10 or more for level 5)     Review of Systems   Unable to perform ROS: Mental status change     Positives and Pertinent negatives as per HPI. Except as noted above in the ROS, all other systems were reviewed and negative.        PAST MEDICAL HISTORY     Past Medical History:   Diagnosis Date    Acute CVA (cerebrovascular accident) ([facility] [address]     Arthritis     CAD (coronary artery disease)     Cancer ([facility] [address]     Chest pain     COPD (chronic obstructive pulmonary disease) ([address]     Diabetes mellitus (HCC)     pump patient    Drug use     Hx of crystal meth, cocaine per patient     Emphysema lung ([address]     Fibromyalgia     takes Lyrica for fibromyalgia and diabetic nerve pain    GERD (gastroesophageal reflux disease)     Heart attack ([address] 1991    1st heart attack (MI) 1991, 2nd heart attack (MI) pt does not recall    Hepatitis     Hep A    History of open heart surgery     Triple Vessel Disease at [facility])    Hyperlipidemia     Hypertension     Peripheral vascular disease ([facility] [address]     Precancerous skin lesion     not active; pt sees skin specialist    Sleep apnea          SURGICAL HISTORY     Past Surgical History:   Procedure Laterality Date    ANKLE CLOSED REDUCTION      CARDIAC SURGERY      single vessel CABG in [address] [place] [address]       Current Discharge Medication List        CONTINUE these medications which have NOT CHANGED    Details   furosemide (LASIX) 20 MG tablet Take 20 mg by mouth 2 times daily      albuterol sulfate  (90 Base) MCG/ACT inhaler Inhale 2 puffs into the lungs every 6 hours as needed for Wheezing  Qty: 1 Inhaler, Refills: 3      losartan (COZAAR) 50 MG tablet Take 1 tablet by mouth daily  Qty: 30 tablet, Refills: 3      Umeclidinium Bromide (INCRUSE ELLIPTA) 62.5 MCG/INH AEPB Inhale 62.5 mcg into the lungs      budesonide-formoterol (SYMBICORT) 160-4.5 MCG/ACT AERO Inhale 2 puffs into the lungs 2 times daily  Qty: 1 Inhaler, Refills: 3      atorvastatin (LIPITOR) 40 MG tablet Take 20 mg by mouth nightly      Azelastine HCl 137 MCG/SPRAY SOLN 1 spray by Nasal route 2 times daily Indications: 1 spray each nare      carvedilol (COREG) 12.5 MG tablet Take 1 tablet by mouth 2 times daily (with meals)  Qty: 60 tablet, Refills: 3      alprostadil (EDEX) 40 MCG injection 40 mcg by Intracavitary route as needed for Erectile Dysfunction use no more than 3 times per week      Insulin Pump Accessories MISC by Does not apply route Basal  6202-8277= 1.9 units/hr  5509-6711=2.05 units/hr  5453-2672=1.4 units/hr  5389-4736=1.8 units/hr     Carb ratio 5.5  Sens 22:1  Goal 2042-1753= 100-120           9216-9766= 120-140      omeprazole (PRILOSEC) 20 MG delayed release capsule Take 20 mg by mouth daily      aspirin 81 MG tablet Take 81 mg by mouth daily      vitamin D (CHOLECALCIFEROL) 1000 UNIT TABS tablet Take 1,000 Units by mouth daily      dicyclomine (BENTYL) 20 MG tablet Take 20 mg by mouth every 6 hours               ALLERGIES     Gabapentin, Morphine, Morphine and related, and Bupropion    FAMILYHISTORY       Family History   Adopted: Yes          SOCIAL HISTORY       Social History     Tobacco Use    Smoking status: Former     Types: Cigarettes     Quit date: 1/8/2007     Years since quitting: 15.5    Smokeless tobacco: Never    Tobacco comments:     3.5 PPD 14 years ago   Vaping Use    Vaping Use: Never used   Substance Use Topics    Alcohol use: No    Drug use: Not Currently     Comment: 32 years ago, uppers, crystal meth       SCREENINGS    Naresh Coma Scale  Eye Opening: Spontaneous  Best Verbal Response: Oriented  Best Motor Response: Obeys commands  Naresh Coma Scale Score: 15        PHYSICAL EXAM    (up to 7 for level 4, 8 or more for level 5)     ED Triage Vitals [08/07/22 1035]   BP Temp Temp Source Heart Rate Resp SpO2 Height Weight   (!) 150/67 98.8 °F (37.1 °C) Oral 79 18 93 % 5' 9\" (1.753 m) 160 lb (72.6 kg)       Physical Exam  Constitutional:       General: He is not in acute distress. Appearance: Normal appearance. He is well-developed. He is not ill-appearing, toxic-appearing or diaphoretic. HENT:      Head: Normocephalic and atraumatic. Right Ear: External ear normal.      Left Ear: External ear normal.      Nose: Nose normal. No congestion or rhinorrhea. Mouth/Throat:      Mouth: Mucous membranes are moist.      Pharynx: No oropharyngeal exudate or posterior oropharyngeal erythema. Eyes:      General:         Right eye: No discharge. Left eye: No discharge. Extraocular Movements: Extraocular movements intact. Conjunctiva/sclera: Conjunctivae normal.      Pupils: Pupils are equal, round, and reactive to light. Cardiovascular:      Rate and Rhythm: Normal rate and regular rhythm. Pulses: Normal pulses. Heart sounds: Normal heart sounds. No murmur heard. No friction rub. No gallop. Comments: 2+ radial pulses bilaterally. No pedal edema. No calf tenderness. No JVD. Pulmonary:      Effort: Pulmonary effort is normal. No respiratory distress. Breath sounds: Normal breath sounds. No stridor. No wheezing, rhonchi or rales. Chest:      Chest wall: No tenderness. Abdominal:      General: Abdomen is flat. Bowel sounds are normal. There is no distension. Palpations: Abdomen is soft. There is no mass. Tenderness: There is no abdominal tenderness. There is no right CVA tenderness, left CVA tenderness, guarding or rebound. Hernia: No hernia is present. Musculoskeletal:         General: Normal range of motion. Cervical back: Normal range of motion and neck supple. No rigidity or tenderness. Lymphadenopathy:      Cervical: No cervical adenopathy. Skin:     General: Skin is warm and dry. Coloration: Skin is not pale. Findings: No erythema or rash.    Neurological: General: No focal deficit present. Mental Status: He is alert. GCS: GCS eye subscore is 4. GCS verbal subscore is 5. GCS motor subscore is 6. Cranial Nerves: Cranial nerves are intact. No cranial nerve deficit, dysarthria or facial asymmetry. Sensory: Sensation is intact. No sensory deficit. Motor: Motor function is intact. No weakness. Comments: Patient alert and oriented x2. GCS of 15. Cranial nerves II through XII intact. Speech is clear. There is no facial droop. EOMs intact. PERRLA. No nystagmus. Negative test of skew. Sensation intact light touch the upper and lower extremities throughout. There is full range of motion strength the upper and lower extremities throughout. No focal deficit or weakness noted. Gait deferred. Psychiatric:         Behavior: Behavior normal.       DIAGNOSTIC RESULTS   LABS:    Labs Reviewed   BASIC METABOLIC PANEL - Abnormal; Notable for the following components:       Result Value    Glucose 323 (*)     BUN 30 (*)     Creatinine 1.4 (*)     GFR Non- 49 (*)     GFR  60 (*)     All other components within normal limits    Narrative:     CALL  [place]  [facility] tel. [phone],  Chemistry results called to and read back by Carolina Otoole, 08/07/2022  11:42, by Didi Torre - Abnormal; Notable for the following components:    Pro-BNP 1,786 (*)     All other components within normal limits    Narrative:     CALL  [facility] tel. [phone],  Chemistry results called to and read back by Carolina Otoole, 08/07/2022  11:42, by [facility]   CBC WITH AUTO DIFFERENTIAL - Abnormal; Notable for the following components:    WBC 12.4 (*)     Platelets 96 (*)     Neutrophils Absolute 10.3 (*)     All other components within normal limits   TROPONIN - Abnormal; Notable for the following components:    Troponin 0.74 (*)     All other components within normal limits    Narrative:     CALL  [place]  [facility] tel. GORDO Lion, 08/07/2022  11:42, by Lyubov Eubanks   LACTATE, SEPSIS   URINE DRUG SCREEN   MICROSCOPIC URINALYSIS   URINE DRUG SCREEN   TROPONIN   TROPONIN   HEMOGLOBIN A1C   TROPONIN   POCT GLUCOSE       When ordered only abnormal lab results are displayed. All other labs were within normal range or not returned as of this dictation. EKG: When ordered, EKG's are interpreted by the Emergency Department Physician in the absence of a cardiologist.  Please see their note for interpretation of EKG. RADIOLOGY:   Non-plain film images such as CT, Ultrasound and MRI are read by the radiologist. Plain radiographic images are visualized and preliminarily interpreted by the ED Provider with the below findings:        Interpretation per the Radiologist below, if available at the time of this note:    CT CHEST PULMONARY EMBOLISM W CONTRAST   Final Result   No findings to suggest large central pulmonary embolism as discussed above. Large layering left pleural effusion. Partial atelectasis of the left lower lobe. Additional asymmetric   heterogeneous left-sided opacity can reflect atelectasis, pneumonia, or   asymmetric pulmonary edema. Indeterminate 2.5 cm lingular pulmonary nodule versus rounded atelectasis. Mediastinal and hilar adenopathy is also seen. Chest CT follow-up after   treatment and resolution of acute symptoms is recommended as malignancy   cannot be excluded. Sclerosis of the lateral right 6th rib and anterior left 3rd rib which could   be due to healing fractures or metastases. CT CERVICAL SPINE WO CONTRAST   Final Result   No acute abnormality of the cervical spine. Multilevel degenerative disc disease and facet arthropathy as outlined above. Left pleural effusion. See CT of the chest of the same date for complete   details.          CT HEAD WO CONTRAST   Preliminary Result   Focal areas of abnormal low attenuation involving left frontal, left   parietal, right occipital, and left cerebellar regions. Findings are   suspicious representing foci of acute/recent ischemic change. Multiplicity   and bilaterality raise the possibility of change associated with underlying   embolic phenomenon. Follow-up MRI examination with diffusion imaging is   recommended for a more complete evaluation. Critical results were called by Dr. Sonam Gallo. Reyna Bazan MD to PALMA Rodriguez on 8/7/2022 at 12:18. XR ANKLE RIGHT (MIN 3 VIEWS)   Final Result   No acute osseous abnormality of the right ankle. XR CHEST PORTABLE   Final Result   Central vascular congestion with perihilar edema consistent with CHF. Left   pleural effusion with dependent opacification, possibly atelectasis or   infiltrate. MRI brain with and without contrast    (Results Pending)     XR ANKLE RIGHT (MIN 3 VIEWS)    Result Date: 8/7/2022  EXAMINATION: THREE XRAY VIEWS OF THE RIGHT ANKLE 8/7/2022 11:28 am COMPARISON: None. HISTORY: ORDERING SYSTEM PROVIDED HISTORY: inj TECHNOLOGIST PROVIDED HISTORY: Reason for exam:->inj Reason for Exam: Fall, right ankle pain FINDINGS: No acute fracture or dislocation. The ankle mortise and talar dome are maintained. No focal soft tissue abnormality. Postoperative clips in the medial aspect of the distal calf. Scattered vascular calcification. No acute osseous abnormality of the right ankle. CT HEAD WO CONTRAST    Result Date: 8/7/2022  EXAMINATION: CT OF THE HEAD WITHOUT CONTRAST  8/7/2022 11:40 am TECHNIQUE: CT of the head was performed without the administration of intravenous contrast. Automated exposure control, iterative reconstruction, and/or weight based adjustment of the mA/kV was utilized to reduce the radiation dose to as low as reasonably achievable. COMPARISON: Prior studies most recent 07/15/2020. HISTORY: ORDERING SYSTEM PROVIDED HISTORY: ams TECHNOLOGIST PROVIDED HISTORY: Has a \"code stroke\" or \"stroke alert\" been called? ->No Reason for exam:->ams Decision Support Exception - unselect if not a suspected or confirmed emergency medical condition->Emergency Medical Condition (MA) Reason for Exam: ams FINDINGS: BRAIN/VENTRICLES: The ventricular system is enlarged but unchanged. No evidence of mass effect or midline shift. Prominence of sulci overlying convexities of cerebral hemispheres and cerebellum consistent with atrophy. Foci of abnormal low attenuation within periventricular/subcortical white matter are nonspecific however likely on the basis of changes of mild ischemic leukoencephalopathy. There are subtle zones of abnormal low attenuation in the posterior left frontal lobe (image 49), parasagittal left parietal lobe (image 51), posterior right occipital lobe (image 31) and posterior left cerebellar hemisphere (image 18). Findings raise the possibility of presence of multifocal areas of acute/recent ischemic change. Bilaterality and multiplicity raise the possibility of change associated with underlying embolic phenomenon. No abnormal extra-axial fluid collection is identified. There is atherosclerotic calcification of distal internal carotid and right vertebral arteries. Right vertebral artery dominant vertebrobasilar system is identified. ORBITS: The visualized portion of the orbits demonstrate no acute abnormality. SINUSES: The visualized paranasal sinuses and mastoid air cells demonstrate no acute abnormality. There is mucosal thickening within several bilateral ethmoid air cells and right sphenoid locule. Partial visualization of rounded low-attenuation lesion within the right maxillary sinus suggests presence of mucous retention cyst or polyp. SOFT TISSUES/SKULL:  No acute abnormality of the visualized skull or soft tissues. Area of asymmetric intermediate attenuation within subcutaneous soft tissues in the left posterior parietal region is unchanged (image 41).      Focal areas of abnormal low attenuation involving left frontal, left parietal, right occipital, and left cerebellar regions. Findings are suspicious representing foci of acute/recent ischemic change. Multiplicity and bilaterality raise the possibility of change associated with underlying embolic phenomenon. Follow-up MRI examination with diffusion imaging is recommended for a more complete evaluation. Critical results were called by Dr. Suellen Liz. Pastor Gamaliel MD to PALMA Alvarez on 8/7/2022 at 12:18. CT CERVICAL SPINE WO CONTRAST    Result Date: 8/7/2022  EXAMINATION: CT OF THE CERVICAL SPINE WITHOUT CONTRAST 8/7/2022 11:41 am TECHNIQUE: CT of the cervical spine was performed without the administration of intravenous contrast. Multiplanar reformatted images are provided for review. Automated exposure control, iterative reconstruction, and/or weight based adjustment of the mA/kV was utilized to reduce the radiation dose to as low as reasonably achievable. COMPARISON: None. HISTORY: ORDERING SYSTEM PROVIDED HISTORY: fall TECHNOLOGIST PROVIDED HISTORY: Reason for exam:->fall Decision Support Exception - unselect if not a suspected or confirmed emergency medical condition->Emergency Medical Condition (MA) Reason for Exam: fall FINDINGS: BONES/ALIGNMENT: There is no acute fracture or traumatic malalignment. DEGENERATIVE CHANGES: Moderate degenerative disc disease at C3-4 and C6-7 with disc space narrowing and spurring. Spurring at the other cervical vertebral levels anteriorly. Multilevel facet arthropathy, right greater than left. Moderate degenerative change at the C1-2 articulation anteriorly. SOFT TISSUES: There is no prevertebral soft tissue swelling. Calcified plaque at the level the carotid bifurcation bilaterally. Partial visualization of a significant left pleural effusion. CT of the chest is pending. No acute abnormality of the cervical spine. Multilevel degenerative disc disease and facet arthropathy as outlined above. Left pleural effusion.   See CT of the chest of the same date for complete details. XR CHEST PORTABLE    Result Date: 8/7/2022  EXAMINATION: ONE XRAY VIEW OF THE CHEST 8/7/2022 11:09 am COMPARISON: 07/15/2020. HISTORY: ORDERING SYSTEM PROVIDED HISTORY: altered mental status TECHNOLOGIST PROVIDED HISTORY: Reason for exam:->altered mental status FINDINGS: The cardiac silhouette is enlarged and stable with post sternotomy changes. Vascular congestion with perihilar edema consistent with CHF. Moderate left pleural effusion with confluent left lower lobe opacification. No significant right pleural effusion. Postoperative clips in the right axillary region. Central vascular congestion with perihilar edema consistent with CHF. Left pleural effusion with dependent opacification, possibly atelectasis or infiltrate. CT CHEST PULMONARY EMBOLISM W CONTRAST    Result Date: 8/7/2022  EXAMINATION: CTA OF THE CHEST 8/7/2022 11:51 am TECHNIQUE: CTA of the chest was performed after the administration of intravenous contrast.  Multiplanar reformatted images are provided for review. MIP images are provided for review. Automated exposure control, iterative reconstruction, and/or weight based adjustment of the mA/kV was utilized to reduce the radiation dose to as low as reasonably achievable.  COMPARISON: CT chest dated 05/01/2019 HISTORY: ORDERING SYSTEM PROVIDED HISTORY: elevated trop - ro PE TECHNOLOGIST PROVIDED HISTORY: Reason for exam:->elevated trop - ro PE Decision Support Exception - unselect if not a suspected or confirmed emergency medical condition->Emergency Medical Condition (MA) Reason for Exam: Altered Mental Status (Pt with AMS x few days per family, in by EMS, pt alert to self and place, disorientated to situation or time, pt inuslin pump per EMS pt has been having trouble, bs 200s) FINDINGS: Pulmonary Arteries: Within the main, central most right, central most left pulmonary arteries, no evidence of filling defect to suggest large central pulmonary embolism. Evaluation beyond the level of the suyapa is nondiagnostic secondary to marked artifact. Mediastinum: Status post median sternotomy. Calcification of the thoracic aorta. No aortic intraluminal flap. Coronary artery calcification. Mediastinal and bilateral hilar adenopathy is demonstrated. Pretracheal lymph node for example measures approximately 1.4 cm in short axis. Precarinal lymph node is 1.4 cm. Right hilar lymph node for example measures 3.1 x 2.3 cm. Thyroid is symmetric. No axillary adenopathy. Lungs/pleura: Large layering left pleural effusion is seen. Emphysema is present. Respiratory motion artifact noted. Partial atelectasis of the left lower lobe. There is heterogeneous and ground-glass opacity within the aerated portion of the left lower lobe. Opacity is seen within the posterior left upper lobe which is heterogeneous. Nodular consolidation is seen at the lingula measuring 2.5 x 1.7 cm. No pneumothorax. Upper Abdomen: Cholelithiasis. Calcified granulomas within the liver and spleen. Soft Tissues/Bones: Irregular sclerosis and periosteal reaction at the lateral right 6th rib. Similar finding is seen at the anterior left 3rd rib. Degenerative change of the thoracic spine. No findings to suggest large central pulmonary embolism as discussed above. Large layering left pleural effusion. Partial atelectasis of the left lower lobe. Additional asymmetric heterogeneous left-sided opacity can reflect atelectasis, pneumonia, or asymmetric pulmonary edema. Indeterminate 2.5 cm lingular pulmonary nodule versus rounded atelectasis. Mediastinal and hilar adenopathy is also seen. Chest CT follow-up after treatment and resolution of acute symptoms is recommended as malignancy cannot be excluded. Sclerosis of the lateral right 6th rib and anterior left 3rd rib which could be due to healing fractures or metastases.            PROCEDURES   Unless otherwise noted below, none Procedures    CRITICAL CARE TIME       CONSULTS:  IP CONSULT TO CARDIOLOGY  IP CONSULT TO PULMONOLOGY  IP CONSULT TO NEUROLOGY  IP CONSULT TO SOCIAL WORK      EMERGENCY DEPARTMENT COURSE and DIFFERENTIAL DIAGNOSIS/MDM:   Vitals:    Vitals:    08/07/22 1530 08/07/22 1546 08/07/22 1600 08/07/22 1624   BP: (!) 130/107 (!) 143/69 (!) 148/66 (!) 154/74   Pulse: 74 76 73 76   Resp: 16 20 15 20   Temp:    98.8 °F (37.1 °C)   TempSrc:    Oral   SpO2: 93% 97% 93% 92%   Weight:    173 lb 14.4 oz (78.9 kg)   Height:    5' 9\" (1.753 m)       Patient was given the following medications:  Medications   albuterol sulfate HFA (PROVENTIL;VENTOLIN;PROAIR) 108 (90 Base) MCG/ACT inhaler 2 puff (has no administration in time range)   aspirin tablet 81 mg (has no administration in time range)   Azelastine HCl SOLN 1 spray (has no administration in time range)   mometasone-formoterol (DULERA) 200-5 MCG/ACT inhaler 2 puff (has no administration in time range)   carvedilol (COREG) tablet 12.5 mg (has no administration in time range)   dicyclomine (BENTYL) tablet 20 mg (has no administration in time range)   losartan (COZAAR) tablet 50 mg (has no administration in time range)   pantoprazole (PROTONIX) tablet 40 mg (has no administration in time range)   tiotropium (SPIRIVA RESPIMAT) 2.5 MCG/ACT inhaler 2 puff (has no administration in time range)   vitamin D (CHOLECALCIFEROL) tablet 1,000 Units (has no administration in time range)   sodium chloride flush 0.9 % injection 5-40 mL (has no administration in time range)   sodium chloride flush 0.9 % injection 5-40 mL (has no administration in time range)   0.9 % sodium chloride infusion (has no administration in time range)   ondansetron (ZOFRAN-ODT) disintegrating tablet 4 mg (has no administration in time range)     Or   ondansetron (ZOFRAN) injection 4 mg (has no administration in time range)   polyethylene glycol (GLYCOLAX) packet 17 g (has no administration in time range)   acetaminophen (TYLENOL) tablet 650 mg (has no administration in time range)     Or   acetaminophen (TYLENOL) suppository 650 mg (has no administration in time range)   perflutren lipid microspheres (DEFINITY) injection 1.65 mg (has no administration in time range)   ampicillin-sulbactam (UNASYN) 3000 mg in 100 mL NS IVPB minibag (has no administration in time range)   0.9 % sodium chloride infusion (has no administration in time range)   atorvastatin (LIPITOR) tablet 80 mg (has no administration in time range)   enoxaparin (LOVENOX) injection 40 mg (has no administration in time range)   clopidogrel (PLAVIX) tablet 75 mg (has no administration in time range)   dextrose bolus 10% 125 mL (has no administration in time range)     Or   dextrose bolus 10% 250 mL (has no administration in time range)   glucagon (rDNA) injection 1 mg (has no administration in time range)   dextrose 10 % infusion (has no administration in time range)   insulin lispro (HUMALOG) injection vial 0-8 Units (has no administration in time range)   insulin lispro (HUMALOG) injection vial 0-4 Units (has no administration in time range)   nitroGLYCERIN (NITROSTAT) SL tablet 0.4 mg (has no administration in time range)   insulin glargine (LANTUS) injection vial 25 Units (has no administration in time range)   insulin lispro (HUMALOG) injection vial 5 Units (has no administration in time range)   iopamidol (ISOVUE-370) 76 % injection 75 mL (75 mLs IntraVENous Given 8/7/22 1151)   aspirin chewable tablet 324 mg (324 mg Oral Given 8/7/22 1248)   cefTRIAXone (ROCEPHIN) 1000 mg in sterile water 10 mL IV syringe (1,000 mg IntraVENous Given 8/7/22 1248)     And   azithromycin (ZITHROMAX) 500 mg in D5W 250ml Vial Mate (0 mg IntraVENous Stopped 8/7/22 1436)         Is this patient to be included in the SEP-1 Core Measure due to severe sepsis or septic shock?    No   Exclusion criteria - the patient is NOT to be included for SEP-1 Core Measure due to:  May have criteria for sepsis, but does not meet criteria for severe sepsis or septic shock    Patient is a 77-year-old male who presents to the ED with complaint of altered mental status. Upon arrival patient is alert to person and place which apparently is different from his baseline mental status. Apparently been ongoing for the past several days. No focal deficit noted on neurologic examination otherwise. IV established and blood work obtained. BNP 1700. BMP showed creatinine of 1.4 which appears consistent with previously documented results with a BUN of 30. CBC showed white count 12.4 with normal hemoglobin and platelets of 96. Troponin is 0.74. Patient denies any chest pain or shortness of breath. EKG interpreted by attending. VBG showed normal pH with a Bicarb of 20.9 and PCO2 of 32.8. Ethanol was negative. Ammonia was normal.  Hydroxybutyrate 1. Lactic acid was normal.  Urinalysis showed 15 ketones but otherwise no significant signs of infection. CK was 716. Hepatic function relatively unremarkable. Urine drug screen was negative. INR 1.44. Procalcitonin 0.98. Chest x-ray showed central vascular congestion with edema consistent with CHF. Left pleural effusion with dependent opacification concerning for atelectasis versus infiltrate. Given patient's elevated procalcitonin did obtain CT of the chest for further evaluation. CT of the chest obtained and showed large layering left pleural effusion. No evidence of pulmonary embolism. Partial atelectasis of the left lower lobe with asymmetric left-sided opacity which could be representative of atelectasis versus pneumonia versus edema. Given patient's elevated procalcitonin I did order antibiotics here in the emergency department with Rocephin and azithromycin. There is a 2.5 cm lingular pulmonary nodule versus rounded atelectasis. Mediastinal and hilar adenopathy seen as malignancy cannot be excluded at this time.   Patient has reported potential fall with right ankle injury and x-ray of the right ankle obtained and showed no acute abnormality. I did CT of the head and cervical spine given concern for potential fall and his altered mental status. CT of the cervical spine showed no acute abnormality. Multilevel degenerative changes noted. CT of the head showed focal areas of abnormal low-attenuation involving the left frontal, left parietal, right occipital and left cerebellar regions which could be representative of acute/recent ischemic change. Radiologist is concerned that given the multiplicity and bilaterally of CT findings concern for potential embolic phenomenon. He has no history of A. fib. There is no anticoagulation. He has been confused for the past couple days and given CT findings concerning potential acute ischemia believe he would benefit from admission for further evaluation and treatment. Case discussed with hospitalist service for admission. He was given aspirin here in the ED. Suffering from altered mental status with concern for pleural effusion/pneumonia and abnormal CT of the head concerning for potential ischemia. He has elevated troponin concerning for NSTEMI. Case discussed with hospital service for admission    FINAL IMPRESSION      1. Altered mental status, unspecified altered mental status type    2. Pneumonia due to infectious organism, unspecified laterality, unspecified part of lung    3. Pleural effusion    4. Abnormal CT of the head    5. Elevated troponin          DISPOSITION/PLAN   DISPOSITION Admitted 08/07/2022 02:16:49 PM      PATIENT REFERRED TO:  No follow-up provider specified.     DISCHARGE MEDICATIONS:  Current Discharge Medication List          DISCONTINUED MEDICATIONS:  Current Discharge Medication List                 (Please note that portions of this note were completed with a voice recognition program.  Efforts were made to edit the dictations but occasionally words are mis-transcribed.)    Keven Masters, PA (electronically signed)          PALMA Cardona  08/07/22 3878

## 2022-08-07 NOTE — H&P
HOSPITALISTS HISTORY AND PHYSICAL    8/7/2022 2:17 PM    Patient Information:  Josiane Hebert is a 78 y.o. male [id number]  PCP:  Harry Moon [phone] (Tel: None )    Chief complaint:    Chief Complaint   Patient presents with    Altered Mental Status     Pt with AMS x few days per family, in by EMS, pt alert to self and place, disorientated to situation or time, pt inuslin pump per EMS pt has been having trouble, bs 200s        History of Present Illness:  Judd Tate is a 78 y.o. male with history of CAD s/p CABG, DM 2 on insulin pump, PVD, COPD, chronic systolic CHF who came to ER with altered mental status. Patient lives alone. Has had dysarthria for past few days. No CP, SOB, HA or fevers. Coughing. Has dysphagia. No abdominal pain, nausea, vomiting, diarrhea, melena or hematochezia. Says he is off balance. Found to have BL embolic strokes and NSTEMI in ED. Otherwise complete ROS is negative unless listed above. REVIEW OF SYSTEMS:   Pertinent positives as noted in HPI. All other systems were reviewed and are negative. Past Medical History:   has a past medical history of Acute CVA (cerebrovascular accident) ([address] Arthritis, CAD (coronary artery disease), Cancer ([address] Chest pain, COPD (chronic obstructive pulmonary disease) ([address] Diabetes mellitus ([address] Drug use, Emphysema lung ([address] Fibromyalgia, GERD (gastroesophageal reflux disease), Heart attack ([address] Hepatitis, History of open heart surgery, Hyperlipidemia, Hypertension, Peripheral vascular disease ([address] Precancerous skin lesion, and Sleep apnea. Past Surgical History:   has a past surgical history that includes eye surgery; Cardiac surgery; and Ankle Closed Reduction. Medications:  No current facility-administered medications on file prior to encounter.      Current Outpatient Medications on File Prior to Encounter Medication Sig Dispense Refill    furosemide (LASIX) 20 MG tablet Take 20 mg by mouth 2 times daily      albuterol sulfate  (90 Base) MCG/ACT inhaler Inhale 2 puffs into the lungs every 6 hours as needed for Wheezing 1 Inhaler 3    losartan (COZAAR) 50 MG tablet Take 1 tablet by mouth daily 30 tablet 3    furosemide (LASIX) 20 MG tablet Take 1 tablet by mouth daily for 10 days 10 tablet 0    Umeclidinium Bromide (INCRUSE ELLIPTA) 62.5 MCG/INH AEPB Inhale 62.5 mcg into the lungs      budesonide-formoterol (SYMBICORT) 160-4.5 MCG/ACT AERO Inhale 2 puffs into the lungs 2 times daily 1 Inhaler 3    atorvastatin (LIPITOR) 40 MG tablet Take 20 mg by mouth nightly      Azelastine HCl 137 MCG/SPRAY SOLN 1 spray by Nasal route 2 times daily Indications: 1 spray each nare      carvedilol (COREG) 12.5 MG tablet Take 1 tablet by mouth 2 times daily (with meals) 60 tablet 3    alprostadil (EDEX) 40 MCG injection 40 mcg by Intracavitary route as needed for Erectile Dysfunction use no more than 3 times per week      Insulin Pump Accessories MISC by Does not apply route Basal  2599-1808= 1.9 units/hr  3961-2945=2.05 units/hr  8603-6164=1.4 units/hr  4959-5506=1.8 units/hr     Carb ratio 5.5  Sens 22:1  Goal 1189-4861= 100-120           6508-0983= 120-140      omeprazole (PRILOSEC) 20 MG delayed release capsule Take 20 mg by mouth daily      aspirin 81 MG tablet Take 81 mg by mouth daily      vitamin D (CHOLECALCIFEROL) 1000 UNIT TABS tablet Take 1,000 Units by mouth daily      dicyclomine (BENTYL) 20 MG tablet Take 20 mg by mouth every 6 hours         Allergies: Allergies   Allergen Reactions    Gabapentin Other (See Comments)     dizzy    Morphine     Morphine And Related Other (See Comments)     Other reaction(s): Confusion  Nightmares     Bupropion Anxiety        Social History:  Patient Lives alone   reports that he quit smoking about 15 years ago. His smoking use included cigarettes.  He has never used smokeless tobacco. He reports that he does not currently use drugs. He reports that he does not drink alcohol. Family History:  family history is not on file. He was adopted. Physical Exam:  BP (!) 150/71   Pulse 74   Temp 98.8 °F (37.1 °C) (Oral)   Resp 19   Ht 5' 9\" (1.753 m)   Wt 160 lb (72.6 kg)   SpO2 95%   BMI 23.63 kg/m²     General appearance:  Appears comfortable. Well nourished  Eyes: Sclera clear, pupils equal  ENT: Moist mucus membranes, no thrush. Trachea midline. Cardiovascular: Regular rhythm, normal S1, S2. No murmur, gallop, rub. No edema in lower extremities  Respiratory: Decreased on L side. R rhonchi. No wheezing or rales. Gastrointestinal: Abdomen soft, non-tender, not distended, normal bowel sounds  Musculoskeletal: No cyanosis in digits, neck supple  Neurology: Mild R facial droop. Dysarthria. 5/5 all extremities  Psychiatry: Appropriate affect. Not agitated  Skin: Warm, dry, normal turgor, no rash  Brisk capillary refill, peripheral pulses palpable   Labs:  CBC:   Lab Results   Component Value Date/Time    WBC 12.4 08/07/2022 11:04 AM    RBC 4.20 08/07/2022 11:04 AM    HGB 14.0 08/07/2022 11:04 AM    HCT 42.0 08/07/2022 11:04 AM    .0 08/07/2022 11:04 AM    MCH 33.3 08/07/2022 11:04 AM    MCHC 33.3 08/07/2022 11:04 AM    RDW 13.8 08/07/2022 11:04 AM    PLT 96 08/07/2022 11:04 AM    MPV 9.3 08/07/2022 11:04 AM     BMP:    Lab Results   Component Value Date/Time     08/07/2022 11:04 AM    K 4.7 08/07/2022 11:04 AM    K 3.8 07/15/2020 09:40 PM     08/07/2022 11:04 AM    CO2 22 08/07/2022 11:04 AM    BUN 30 08/07/2022 11:04 AM    CREATININE 1.4 08/07/2022 11:04 AM    CALCIUM 9.0 08/07/2022 11:04 AM    GFRAA 60 08/07/2022 11:04 AM    LABGLOM 49 08/07/2022 11:04 AM    GLUCOSE 323 08/07/2022 11:04 AM     CT CHEST PULMONARY EMBOLISM W CONTRAST   Final Result   No findings to suggest large central pulmonary embolism as discussed above.       Large layering left pleural effusion. Partial atelectasis of the left lower lobe. Additional asymmetric   heterogeneous left-sided opacity can reflect atelectasis, pneumonia, or   asymmetric pulmonary edema. Indeterminate 2.5 cm lingular pulmonary nodule versus rounded atelectasis. Mediastinal and hilar adenopathy is also seen. Chest CT follow-up after   treatment and resolution of acute symptoms is recommended as malignancy   cannot be excluded. Sclerosis of the lateral right 6th rib and anterior left 3rd rib which could   be due to healing fractures or metastases. CT CERVICAL SPINE WO CONTRAST   Final Result   No acute abnormality of the cervical spine. Multilevel degenerative disc disease and facet arthropathy as outlined above. Left pleural effusion. See CT of the chest of the same date for complete   details. CT HEAD WO CONTRAST   Preliminary Result   Focal areas of abnormal low attenuation involving left frontal, left   parietal, right occipital, and left cerebellar regions. Findings are   suspicious representing foci of acute/recent ischemic change. Multiplicity   and bilaterality raise the possibility of change associated with underlying   embolic phenomenon. Follow-up MRI examination with diffusion imaging is   recommended for a more complete evaluation. Critical results were called by Dr. Addis Abarca. April Hebert MD to PALMA Tilley on 8/7/2022 at 12:18. XR ANKLE RIGHT (MIN 3 VIEWS)   Final Result   No acute osseous abnormality of the right ankle. XR CHEST PORTABLE   Final Result   Central vascular congestion with perihilar edema consistent with CHF. Left   pleural effusion with dependent opacification, possibly atelectasis or   infiltrate.              Problem List  Principal Problem:    Acute CVA (cerebrovascular accident) [facility])  Active Problems:    Peripheral vascular disease ([facility]    S/P CABG (coronary artery bypass graft)    Chronic systolic CHF (congestive heart failure) (HCC)    Pleural effusion, left    Rib lesion    Dysarthria    Dysphagia    NSTEMI (non-ST elevated myocardial infarction) (H[facility])    CKD (chronic kidney disease) stage 3, GFR 30-59 ml/min (HCC)    Type 2 diabetes mellitus with complication (HCC)    Dehydration    Chronic obstructive pulmonary disease (HCC)    Coronary artery disease due to lipid rich plaque    Ischemic cardiomyopathy    Essential hypertension  Resolved Problems:    * No resolved hospital problems. *        Assessment/Plan:   Start Plavix and Lipitor  Continue ASA  Check MRI Brain WITH and without contrast to evaluate for embolic strokes vs metastatic disease  Check Echo for embolic strokes  Neurology consult for acute CVA  Cardio consult for NSTEMI  Pulm consult for L effusion (? Malignancy)  Unasyn IV for aspiration PNA  Continue home inhalers  NTG PRN   SSI  IVF  Hold Lasix  PT/OT/SLP eval  Hold insulin pump  Start Lantus 25 U bid and Humalog 5 U with meals      DVT prophylaxis Lovenox  Code status Full code   Diet NPO, General  IV access Peripheral  Vasques Catheter No    Admit as inpatient. I anticipate hospitalization spanning more than two midnights for investigation and treatment of the above medically necessary diagnoses. Discussed with patient. Will monitor closely.     Shelton Crigler, MD    8/7/2022 2:17 PM

## 2022-08-07 NOTE — ED NOTES
Pt report given to 3T RN, states no questions or concerns at this time. Pt transported to floor via bed by ED tech with portable telemetry on throughout transport.       Eloina Matos RN  08/07/22 0750

## 2022-08-07 NOTE — PROGRESS NOTES
4 Eyes Admission Assessment     I agree as the admission nurse that 2 RN's have performed a thorough Head to Toe Skin Assessment on the patient. ALL assessment sites listed below have been assessed on admission. Areas assessed by both nurses:   [x]   Head, Face, and Ears   [x]   Shoulders, Back, and Chest  [x]   Arms, Elbows, and Hands   [x]   Coccyx, Sacrum, and Ischium  [x]   Legs, Feet, and Heels        Does the Patient have Skin Breakdown?   No   Abrasion to R ankle with scab,bruising to R foot        Russell Prevention initiated:  Yes   Wound Care Orders initiated:  NA      [facility] nurse consulted for Pressure Injury (Stage 3,4, Unstageable, DTI, NWPT, and Complex wounds) or Russell score 18 or lower:  NA      Nurse 1 eSignature: Electronically signed by Jl Dubois RN on 8/7/22 at 6:56 PM EDT    **SHARE this note so that the co-signing nurse is able to place an eSignature**    Nurse 2 eSignature: Electronically signed by Ariel Victor RN on 8/7/22 at 6:59 PM EDT

## 2022-08-08 ENCOUNTER — APPOINTMENT (OUTPATIENT)
Dept: MRI IMAGING | Age: 77
DRG: 064 | End: 2022-08-08
Payer: OTHER GOVERNMENT

## 2022-08-08 ENCOUNTER — APPOINTMENT (OUTPATIENT)
Dept: GENERAL RADIOLOGY | Age: 77
DRG: 064 | End: 2022-08-08
Payer: OTHER GOVERNMENT

## 2022-08-08 ENCOUNTER — APPOINTMENT (OUTPATIENT)
Dept: INTERVENTIONAL RADIOLOGY/VASCULAR | Age: 77
DRG: 064 | End: 2022-08-08
Payer: OTHER GOVERNMENT

## 2022-08-08 PROBLEM — E78.5 DYSLIPIDEMIA: Status: ACTIVE | Noted: 2022-08-08

## 2022-08-08 PROBLEM — R77.8 ELEVATED TROPONIN: Status: ACTIVE | Noted: 2022-08-08

## 2022-08-08 PROBLEM — R41.82 ALTERED MENTAL STATUS: Status: ACTIVE | Noted: 2022-08-08

## 2022-08-08 PROBLEM — G04.90 MENINGOENCEPHALITIS: Status: ACTIVE | Noted: 2022-08-08

## 2022-08-08 LAB
ANION GAP SERPL CALCULATED.3IONS-SCNC: 19 MMOL/L (ref 3–16)
BUN BLDV-MCNC: 28 MG/DL (ref 7–20)
CALCIUM SERPL-MCNC: 8.3 MG/DL (ref 8.3–10.6)
CHLORIDE BLD-SCNC: 101 MMOL/L (ref 99–110)
CHOLESTEROL, TOTAL: 125 MG/DL (ref 0–199)
CO2: 16 MMOL/L (ref 21–32)
CREAT SERPL-MCNC: 1.6 MG/DL (ref 0.8–1.3)
EKG ATRIAL RATE: 250 BPM
EKG DIAGNOSIS: NORMAL
EKG Q-T INTERVAL: 288 MS
EKG QRS DURATION: 112 MS
EKG QTC CALCULATION (BAZETT): 472 MS
EKG R AXIS: 61 DEGREES
EKG T AXIS: -64 DEGREES
EKG VENTRICULAR RATE: 162 BPM
ESTIMATED AVERAGE GLUCOSE: 162.8 MG/DL
FLUID TYPE: NORMAL
GFR AFRICAN AMERICAN: 51
GFR NON-AFRICAN AMERICAN: 42
GLUCOSE BLD-MCNC: 338 MG/DL (ref 70–99)
GLUCOSE BLD-MCNC: 364 MG/DL (ref 70–99)
GLUCOSE BLD-MCNC: 370 MG/DL (ref 70–99)
GLUCOSE BLD-MCNC: 380 MG/DL (ref 70–99)
GLUCOSE BLD-MCNC: 382 MG/DL (ref 70–99)
GLUCOSE, FLUID: 370 MG/DL
HBA1C MFR BLD: 7.3 %
HCT VFR BLD CALC: 43.8 % (ref 40.5–52.5)
HDLC SERPL-MCNC: 27 MG/DL (ref 40–60)
HEMOGLOBIN: 14.7 G/DL (ref 13.5–17.5)
LD, FLUID: 324 U/L
LDL CHOLESTEROL CALCULATED: 68 MG/DL
MCH RBC QN AUTO: 33.1 PG (ref 26–34)
MCHC RBC AUTO-ENTMCNC: 33.4 G/DL (ref 31–36)
MCV RBC AUTO: 99.2 FL (ref 80–100)
PDW BLD-RTO: 13.3 % (ref 12.4–15.4)
PERFORMED ON: ABNORMAL
PLATELET # BLD: 77 K/UL (ref 135–450)
PMV BLD AUTO: 8.8 FL (ref 5–10.5)
POTASSIUM REFLEX MAGNESIUM: 5 MMOL/L (ref 3.5–5.1)
PROTEIN FLUID: 4 G/DL
RBC # BLD: 4.42 M/UL (ref 4.2–5.9)
SARS-COV-2, NAAT: NOT DETECTED
SODIUM BLD-SCNC: 136 MMOL/L (ref 136–145)
TRIGL SERPL-MCNC: 149 MG/DL (ref 0–150)
TROPONIN: 0.6 NG/ML
VLDLC SERPL CALC-MCNC: 30 MG/DL
WBC # BLD: 14.6 K/UL (ref 4–11)

## 2022-08-08 PROCEDURE — 99223 1ST HOSP IP/OBS HIGH 75: CPT | Performed by: INTERNAL MEDICINE

## 2022-08-08 PROCEDURE — 70551 MRI BRAIN STEM W/O DYE: CPT

## 2022-08-08 PROCEDURE — 84157 ASSAY OF PROTEIN OTHER: CPT

## 2022-08-08 PROCEDURE — 92523 SPEECH SOUND LANG COMPREHEN: CPT

## 2022-08-08 PROCEDURE — 88112 CYTOPATH CELL ENHANCE TECH: CPT

## 2022-08-08 PROCEDURE — 80061 LIPID PANEL: CPT

## 2022-08-08 PROCEDURE — 88305 TISSUE EXAM BY PATHOLOGIST: CPT

## 2022-08-08 PROCEDURE — 87070 CULTURE OTHR SPECIMN AEROBIC: CPT

## 2022-08-08 PROCEDURE — 2060000000 HC ICU INTERMEDIATE R&B

## 2022-08-08 PROCEDURE — 82945 GLUCOSE OTHER FLUID: CPT

## 2022-08-08 PROCEDURE — 88341 IMHCHEM/IMCYTCHM EA ADD ANTB: CPT

## 2022-08-08 PROCEDURE — 84484 ASSAY OF TROPONIN QUANT: CPT

## 2022-08-08 PROCEDURE — 6370000000 HC RX 637 (ALT 250 FOR IP): Performed by: INTERNAL MEDICINE

## 2022-08-08 PROCEDURE — 83615 LACTATE (LD) (LDH) ENZYME: CPT

## 2022-08-08 PROCEDURE — 6360000002 HC RX W HCPCS: Performed by: INTERNAL MEDICINE

## 2022-08-08 PROCEDURE — 83036 HEMOGLOBIN GLYCOSYLATED A1C: CPT

## 2022-08-08 PROCEDURE — 99223 1ST HOSP IP/OBS HIGH 75: CPT | Performed by: PSYCHIATRY & NEUROLOGY

## 2022-08-08 PROCEDURE — 32555 ASPIRATE PLEURA W/ IMAGING: CPT

## 2022-08-08 PROCEDURE — 85027 COMPLETE CBC AUTOMATED: CPT

## 2022-08-08 PROCEDURE — C1729 CATH, DRAINAGE: HCPCS

## 2022-08-08 PROCEDURE — 93880 EXTRACRANIAL BILAT STUDY: CPT

## 2022-08-08 PROCEDURE — 94640 AIRWAY INHALATION TREATMENT: CPT

## 2022-08-08 PROCEDURE — 2500000003 HC RX 250 WO HCPCS: Performed by: NURSE PRACTITIONER

## 2022-08-08 PROCEDURE — 92610 EVALUATE SWALLOWING FUNCTION: CPT

## 2022-08-08 PROCEDURE — 87635 SARS-COV-2 COVID-19 AMP PRB: CPT

## 2022-08-08 PROCEDURE — 76604 US EXAM CHEST: CPT | Performed by: INTERNAL MEDICINE

## 2022-08-08 PROCEDURE — 0W9B3ZZ DRAINAGE OF LEFT PLEURAL CAVITY, PERCUTANEOUS APPROACH: ICD-10-PCS | Performed by: INTERNAL MEDICINE

## 2022-08-08 PROCEDURE — 2580000003 HC RX 258: Performed by: NURSE PRACTITIONER

## 2022-08-08 PROCEDURE — 80048 BASIC METABOLIC PNL TOTAL CA: CPT

## 2022-08-08 PROCEDURE — 87205 SMEAR GRAM STAIN: CPT

## 2022-08-08 PROCEDURE — 71045 X-RAY EXAM CHEST 1 VIEW: CPT

## 2022-08-08 PROCEDURE — 36415 COLL VENOUS BLD VENIPUNCTURE: CPT

## 2022-08-08 PROCEDURE — 2580000003 HC RX 258: Performed by: INTERNAL MEDICINE

## 2022-08-08 PROCEDURE — 88342 IMHCHEM/IMCYTCHM 1ST ANTB: CPT

## 2022-08-08 PROCEDURE — 93010 ELECTROCARDIOGRAM REPORT: CPT | Performed by: INTERNAL MEDICINE

## 2022-08-08 PROCEDURE — 2500000003 HC RX 250 WO HCPCS: Performed by: INTERNAL MEDICINE

## 2022-08-08 RX ORDER — AMIODARONE HYDROCHLORIDE 200 MG/1
400 TABLET ORAL
Status: DISCONTINUED | OUTPATIENT
Start: 2022-08-08 | End: 2022-08-10

## 2022-08-08 RX ORDER — FUROSEMIDE 10 MG/ML
20 INJECTION INTRAMUSCULAR; INTRAVENOUS ONCE
Status: COMPLETED | OUTPATIENT
Start: 2022-08-08 | End: 2022-08-08

## 2022-08-08 RX ORDER — INSULIN GLARGINE 100 [IU]/ML
29 INJECTION, SOLUTION SUBCUTANEOUS 2 TIMES DAILY
Status: DISCONTINUED | OUTPATIENT
Start: 2022-08-08 | End: 2022-08-10 | Stop reason: HOSPADM

## 2022-08-08 RX ORDER — INSULIN LISPRO 100 [IU]/ML
10 INJECTION, SOLUTION INTRAVENOUS; SUBCUTANEOUS
Status: DISCONTINUED | OUTPATIENT
Start: 2022-08-08 | End: 2022-08-10 | Stop reason: HOSPADM

## 2022-08-08 RX ORDER — INSULIN LISPRO 100 [IU]/ML
0-16 INJECTION, SOLUTION INTRAVENOUS; SUBCUTANEOUS
Status: DISCONTINUED | OUTPATIENT
Start: 2022-08-08 | End: 2022-08-10 | Stop reason: HOSPADM

## 2022-08-08 RX ORDER — DILTIAZEM HYDROCHLORIDE 120 MG/1
120 CAPSULE, COATED, EXTENDED RELEASE ORAL DAILY
Status: DISCONTINUED | OUTPATIENT
Start: 2022-08-08 | End: 2022-08-08

## 2022-08-08 RX ORDER — METOPROLOL TARTRATE 50 MG/1
50 TABLET, FILM COATED ORAL 2 TIMES DAILY
Status: DISCONTINUED | OUTPATIENT
Start: 2022-08-08 | End: 2022-08-08

## 2022-08-08 RX ORDER — DILTIAZEM HYDROCHLORIDE 5 MG/ML
10 INJECTION INTRAVENOUS ONCE
Status: DISCONTINUED | OUTPATIENT
Start: 2022-08-08 | End: 2022-08-10 | Stop reason: HOSPADM

## 2022-08-08 RX ORDER — ALENDRONATE SODIUM 70 MG/1
70 TABLET ORAL
COMMUNITY

## 2022-08-08 RX ORDER — METOPROLOL TARTRATE 5 MG/5ML
3 INJECTION INTRAVENOUS
Status: DISCONTINUED | OUTPATIENT
Start: 2022-08-08 | End: 2022-08-09

## 2022-08-08 RX ORDER — SPIRONOLACTONE 25 MG/1
25 TABLET ORAL DAILY
COMMUNITY

## 2022-08-08 RX ORDER — FUROSEMIDE 20 MG/1
20 TABLET ORAL 2 TIMES DAILY
COMMUNITY

## 2022-08-08 RX ORDER — METOPROLOL TARTRATE 5 MG/5ML
5 INJECTION INTRAVENOUS ONCE
Status: COMPLETED | OUTPATIENT
Start: 2022-08-08 | End: 2022-08-08

## 2022-08-08 RX ADMIN — SODIUM CHLORIDE 3000 MG: 900 INJECTION INTRAVENOUS at 03:16

## 2022-08-08 RX ADMIN — Medication 1000 UNITS: at 09:58

## 2022-08-08 RX ADMIN — INSULIN GLARGINE 25 UNITS: 100 INJECTION, SOLUTION SUBCUTANEOUS at 10:13

## 2022-08-08 RX ADMIN — INSULIN LISPRO 10 UNITS: 100 INJECTION, SOLUTION INTRAVENOUS; SUBCUTANEOUS at 17:25

## 2022-08-08 RX ADMIN — Medication 2 PUFF: at 09:15

## 2022-08-08 RX ADMIN — LOSARTAN POTASSIUM 50 MG: 25 TABLET, FILM COATED ORAL at 09:58

## 2022-08-08 RX ADMIN — DILTIAZEM HYDROCHLORIDE 15 MG/HR: 5 INJECTION, SOLUTION INTRAVENOUS at 04:02

## 2022-08-08 RX ADMIN — INSULIN GLARGINE 29 UNITS: 100 INJECTION, SOLUTION SUBCUTANEOUS at 21:14

## 2022-08-08 RX ADMIN — INSULIN LISPRO 4 UNITS: 100 INJECTION, SOLUTION INTRAVENOUS; SUBCUTANEOUS at 10:11

## 2022-08-08 RX ADMIN — CARVEDILOL 12.5 MG: 3.12 TABLET, FILM COATED ORAL at 10:26

## 2022-08-08 RX ADMIN — PANTOPRAZOLE SODIUM 40 MG: 40 TABLET, DELAYED RELEASE ORAL at 06:39

## 2022-08-08 RX ADMIN — FUROSEMIDE 20 MG: 10 INJECTION, SOLUTION INTRAMUSCULAR; INTRAVENOUS at 15:37

## 2022-08-08 RX ADMIN — METOPROLOL TARTRATE 5 MG: 5 INJECTION INTRAVENOUS at 06:34

## 2022-08-08 RX ADMIN — Medication 10 ML: at 10:19

## 2022-08-08 RX ADMIN — SODIUM CHLORIDE 3000 MG: 900 INJECTION INTRAVENOUS at 15:49

## 2022-08-08 RX ADMIN — METOPROLOL TARTRATE 50 MG: 50 TABLET ORAL at 09:58

## 2022-08-08 RX ADMIN — SODIUM CHLORIDE 3000 MG: 900 INJECTION INTRAVENOUS at 10:23

## 2022-08-08 RX ADMIN — SODIUM CHLORIDE: 9 INJECTION, SOLUTION INTRAVENOUS at 02:01

## 2022-08-08 RX ADMIN — TIOTROPIUM BROMIDE INHALATION SPRAY 2 PUFF: 3.12 SPRAY, METERED RESPIRATORY (INHALATION) at 09:15

## 2022-08-08 RX ADMIN — AZELASTINE HYDROCHLORIDE 1 SPRAY: 137 SPRAY, METERED NASAL at 10:50

## 2022-08-08 RX ADMIN — Medication 10 ML: at 21:04

## 2022-08-08 RX ADMIN — DICYCLOMINE HYDROCHLORIDE 20 MG: 10 CAPSULE ORAL at 04:46

## 2022-08-08 RX ADMIN — INSULIN LISPRO 5 UNITS: 100 INJECTION, SOLUTION INTRAVENOUS; SUBCUTANEOUS at 10:12

## 2022-08-08 RX ADMIN — Medication 2 PUFF: at 21:29

## 2022-08-08 RX ADMIN — INSULIN LISPRO 4 UNITS: 100 INJECTION, SOLUTION INTRAVENOUS; SUBCUTANEOUS at 21:15

## 2022-08-08 RX ADMIN — SODIUM CHLORIDE, PRESERVATIVE FREE 10 ML: 5 INJECTION INTRAVENOUS at 06:34

## 2022-08-08 RX ADMIN — SODIUM CHLORIDE 3000 MG: 900 INJECTION INTRAVENOUS at 21:09

## 2022-08-08 RX ADMIN — DICYCLOMINE HYDROCHLORIDE 20 MG: 10 CAPSULE ORAL at 09:58

## 2022-08-08 ASSESSMENT — PAIN DESCRIPTION - LOCATION: LOCATION: ABDOMEN

## 2022-08-08 ASSESSMENT — PAIN SCALES - GENERAL
PAINLEVEL_OUTOF10: 1
PAINLEVEL_OUTOF10: 1
PAINLEVEL_OUTOF10: 4

## 2022-08-08 NOTE — PROGRESS NOTES
Pt found trying to get out of bed. HR in 160's and respirations 30 a minute. SpO2 89% on 6L NC. /69.  Cardizem gtt turned up to 7.5mg/hr

## 2022-08-08 NOTE — CONSULTS
MD Augusto Alexander MD Leotis Manes, MD                  Office: [phone]                 Fax: [phone]         [address] [place] [address]                   PATIENT NAME: Alfa Quiroz  : 1945  MRN: [id number]      Nephrologist consults received, but pt seen by Dr Wilmer Sun in 2019, then lost for f/u with them, so will refer back to Dr Wilmer Sun -     D/w dr Kristen Price too; Thank you for contacting me to participate in this patient's care. Please do not hesitate to contact me for any questions/concerns.      Jorge Barrios MD  [facility] [address] [place] [address]   Phone: [phone]

## 2022-08-08 NOTE — PROGRESS NOTES
Physical/Occupational Therapy  Gokul Eric    Orders received, chart reviewed. Attempted PT/OT evaluation this date. Pt supine in bed upon arrival with friend/POA present and sitter present. Pt sleeping upon arrival and sitter and friend stating pt has been lethargic all day and has MRI pending and thoracentesis pending this date. Will re-attempt evaluation tomorrow as schedule allows and as pt becomes medically appropriate.    [address] [place], [place] [address]    Paris Garcia OT

## 2022-08-08 NOTE — PROGRESS NOTES
[facility] PROGRESS NOTE    8/8/2022 12:07 PM        Name: Katie Thrasher . Admitted: 8/7/2022  Primary Care Provider: Harry Moon [phone] (Tel: None)      Subjective:    Patient lying in bed did have episode heart rate up to 168 with RVR patient was given diltiazem this improved and now in the 60s did have drop in blood pressure and was given 500 cc bolus patient oxygen requirement went up to 6 L is having tachypnea.   No chest pain no nausea vomiting    Reviewed interval ancillary notes    Current Medications  dilTIAZem injection 10 mg, Once  metoprolol (LOPRESSOR) injection 3 mg, Q3H PRN  amiodarone (CORDARONE) tablet 400 mg, TID WC  insulin glargine (LANTUS) injection vial 29 Units, BID  insulin lispro (HUMALOG) injection vial 10 Units, TID WC  albuterol sulfate HFA (PROVENTIL;VENTOLIN;PROAIR) 108 (90 Base) MCG/ACT inhaler 2 puff, Q6H PRN  aspirin chewable tablet 81 mg, Daily  azelastine (ASTELIN) 0.1 % nasal spray 1 spray, BID  mometasone-formoterol (DULERA) 200-5 MCG/ACT inhaler 2 puff, BID  carvedilol (COREG) tablet 12.5 mg, BID WC  dicyclomine (BENTYL) capsule 20 mg, Q6H  losartan (COZAAR) tablet 50 mg, Daily  pantoprazole (PROTONIX) tablet 40 mg, QAM AC  tiotropium (SPIRIVA RESPIMAT) 2.5 MCG/ACT inhaler 2 puff, Daily  vitamin D (CHOLECALCIFEROL) tablet 1,000 Units, Daily  sodium chloride flush 0.9 % injection 5-40 mL, 2 times per day  sodium chloride flush 0.9 % injection 5-40 mL, PRN  0.9 % sodium chloride infusion, PRN  ondansetron (ZOFRAN-ODT) disintegrating tablet 4 mg, Q8H PRN   Or  ondansetron (ZOFRAN) injection 4 mg, Q6H PRN  polyethylene glycol (GLYCOLAX) packet 17 g, Daily PRN  acetaminophen (TYLENOL) tablet 650 mg, Q6H PRN   Or  acetaminophen (TYLENOL) suppository 650 mg, Q6H PRN  perflutren lipid microspheres (DEFINITY) injection 1.65 mg, ONCE PRN  ampicillin-sulbactam (UNASYN) 3000 mg in 100 mL NS IVPB minibag, Q6H  atorvastatin (LIPITOR) tablet 80 mg, Nightly  enoxaparin (LOVENOX) injection 40 mg, Daily  clopidogrel (PLAVIX) tablet 75 mg, Daily  dextrose bolus 10% 125 mL, PRN   Or  dextrose bolus 10% 250 mL, PRN  glucagon (rDNA) injection 1 mg, PRN  dextrose 10 % infusion, Continuous PRN  insulin lispro (HUMALOG) injection vial 0-8 Units, TID WC  insulin lispro (HUMALOG) injection vial 0-4 Units, Nightly  nitroGLYCERIN (NITROSTAT) SL tablet 0.4 mg, Q5 Min PRN        Objective:  /77   Pulse 74   Temp 99 °F (37.2 °C) (Oral)   Resp 18   Ht 5' 9\" (1.753 m)   Wt 174 lb 9.6 oz (79.2 kg)   SpO2 91%   BMI 25.78 kg/m²     Intake/Output Summary (Last 24 hours) at 8/8/2022 1207  Last data filed at 8/8/2022 0155  Gross per 24 hour   Intake 340.87 ml   Output 450 ml   Net -109.13 ml      Wt Readings from Last 3 Encounters:   08/08/22 174 lb 9.6 oz (79.2 kg)   06/06/22 162 lb (73.5 kg)   06/07/21 176 lb (79.8 kg)       General appearance:  uncomfortable oriented to self only  HEENT: atraumatic, Pupils equal, muscous membranes moist, no masses appreciated  Cardiovascular: irregualry irerguloar no murmurs appreciated  Respiratory: bilateral crackles  Gastrointestinal: Abdomen soft, non-tender, BS+  EXT: no edema  Neurology: Right facial droop patient is withdrawing all extremities to painful stimulus but not following commands  Psychiatry: Appropriate affect.  Not agitated  Skin: Warm, dry, no rashes appreciated    Labs and Tests:  CBC:   Recent Labs     08/07/22  1104 08/08/22  0444   WBC 12.4* 14.6*   HGB 14.0 14.7   PLT 96* 77*     BMP:    Recent Labs     08/07/22  1104 08/08/22  0444    136   K 4.7 5.0    101   CO2 22 16*   BUN 30* 28*   CREATININE 1.4* 1.6*   GLUCOSE 323* 380*     Hepatic:   Recent Labs     08/07/22  1104   AST 52*   ALT 17   BILITOT 0.9   ALKPHOS 55     CT CHEST PULMONARY EMBOLISM W CONTRAST   Final Result   No findings to suggest large central pulmonary embolism as discussed Acute CVA (cerebrovascular accident) [facility])  Active Problems:    Peripheral vascular disease (HCC)    S/P CABG (coronary artery bypass graft)    Chronic systolic CHF (congestive heart failure) ([facility])    Pleural effusion, left    Rib lesion    Dysarthria    Dysphagia    NSTEMI (non-ST elevated myocardial infarction) ([facility])    CKD (chronic kidney disease) stage 3, GFR 30-59 ml/min ([facility])    Acute metabolic encephalopathy    Aspiration pneumonia ([facility])    Type 2 diabetes mellitus with complication ([facility])    Dehydration    Chronic obstructive pulmonary disease ([facility])    Coronary artery disease due to lipid rich plaque    Ischemic cardiomyopathy    Essential hypertension  Resolved Problems:    * No resolved hospital problems. *       Assessment & Plan:   ?acute cva  -  mri brain echo and carotid us pending  - likely embolic will need mri and neuro input and ac    PAF with RVR  - dilt, amio  - cards on board  - ac if ok with neuro    Acute hypoxic resp failure with possible bacterial pna, and pleural effusion  - hold ivf  - iv atbx  - thoracentis if possible today  - iv lasix    Elevated tropoinins: per cards not NSTEMI,  - monitor    Dm2 with hyperglycemia: off insulin pump  - increase lantus and lispro    Dysphagia: speech on board dysphagia 3 diet    Alphonso nephro consult    Diet: ADULT DIET;  Regular; 4 carb choices (60 gm/meal)  Code:Full Code      I spent  32 minutes providing critical care services to this patient thus far today      Ca Espinal MD   8/8/2022 12:07 PM

## 2022-08-08 NOTE — PROGRESS NOTES
At 2159 pt flipped intro afib RVR -170 and pt became short of breath with increased oxygen requirements. Ultrasound guided IV inserted for cardizem gtt. At 2314 pt NSR on telemetry.  Notified NP.

## 2022-08-08 NOTE — PLAN OF CARE
Problem: Discharge Planning  Goal: Discharge to home or other facility with appropriate resources  Outcome: Not Progressing  Flowsheets (Taken 8/8/2022 1020)  Discharge to home or other facility with appropriate resources: Identify barriers to discharge with patient and caregiver     Problem: Pain  Goal: Verbalizes/displays adequate comfort level or baseline comfort level  Outcome: Not Progressing     Problem: Safety - Adult  Goal: Free from fall injury  Outcome: Not Progressing     Problem: Skin/Tissue Integrity  Goal: Absence of new skin breakdown  Description: 1. Monitor for areas of redness and/or skin breakdown  2. Assess vascular access sites hourly  3. Every 4-6 hours minimum:  Change oxygen saturation probe site  4. Every 4-6 hours:  If on nasal continuous positive airway pressure, respiratory therapy assess nares and determine need for appliance change or resting period.   Outcome: Not Progressing     Problem: Chronic Conditions and Co-morbidities  Goal: Patient's chronic conditions and co-morbidity symptoms are monitored and maintained or improved  Outcome: Not Progressing  Flowsheets (Taken 8/8/2022 1020)  Care Plan - Patient's Chronic Conditions and Co-Morbidity Symptoms are Monitored and Maintained or Improved: Monitor and assess patient's chronic conditions and comorbid symptoms for stability, deterioration, or improvement

## 2022-08-08 NOTE — SIGNIFICANT EVENT
Hospitalist    Notified by nurse that patient's heart rate elevated 160s 170s stat EKG was done. A. fib RVR with a rate of 162  Is feel short of breath he denies any chest pain or palpitations. Initially was on 2 L nasal cannula he is now up to 5 L nasal cannula. IV Cardizem bolus and drip ordered and started. Patient's heart rate still 130s he did have a slight drop in his blood pressure but did rebound. Will give 500cc bolus    He was admitted earlier for AMS and CT showed CVA  Focal areas of abnormal low attenuation involving left frontal, left   parietal, right occipital, and left cerebellar regions. Findings are   suspicious representing foci of acute/recent ischemic change. Multiplicity   and bilaterality raise the possibility of change associated with underlying   embolic phenomenon. Patient will need anticoagulation for A-fib however will need ok per Neurology. Goal  or less  Nurse to call for worsening respiratory status.      Lew Galeana NP

## 2022-08-08 NOTE — PROGRESS NOTES
1500ml of fluid removed  Spoke to patients GORDO virk and advised her the amount of fluid removed and that patient was returning to the floor.

## 2022-08-08 NOTE — CONSULTS
In patient Neurology consult        [facility] Neurology      MD Cecilio Duarte  1945    Date of Service: 8/8/2022    Referring Physician: Charmaine Finnegan MD    Most of the history was obtained from detailed chart reviewing and discussion with the patient's POA. The patient is currently confused and unable to provide me with accurate history. Reason for the consult and CC: Acute encephalopathy, found down. HPI:   The patient is a 78y.o.  years old male with history of CAD, diabetes and hypertension who was found by his POA on Friday unresponsive. Unclear duration but could be hours. Unwitnessed. He was not responding to questions. Degree was severe. No witnessed seizure, tongue biting or bladder incontinence. No other relieving or aggravating factors or clear triggers. Patient came to the ED for evaluation peer initial imaging showed possible bilateral embolic stroke on CT head. His blood sugar was waxing and waning. He was admitted to the hospital.  Now he is waxing and waning. Able to answer few questions and goes back to sleep. Unable to give any more history. Other review of system was limited.         Family History   Adopted: Yes         Past Medical History:   Diagnosis Date    Acute CVA (cerebrovascular accident) ([address]     Arthritis     CAD (coronary artery disease)     Cancer ([address]     Chest pain     COPD (chronic obstructive pulmonary disease) (HCC)     Diabetes mellitus (HCC)     pump patient    Drug use     Hx of crystal meth, cocaine per patient     Emphysema lung ([address]     Fibromyalgia     takes Lyrica for fibromyalgia and diabetic nerve pain    GERD (gastroesophageal reflux disease)     Heart attack ([address] 1991    1st heart attack (MI) 1991, 2nd heart attack (MI) pt does not recall    Hepatitis     Hep A    History of open heart surgery     Triple Vessel Disease at [place] ([facility])    Hyperlipidemia     Hypertension     Peripheral vascular disease ([address] Precancerous skin lesion     not active; pt sees skin specialist    Sleep apnea      Past Surgical History:   Procedure Laterality Date    ANKLE CLOSED REDUCTION      CARDIAC SURGERY      single vessel CABG in 1999    EYE SURGERY      cataracts     Social History     Tobacco Use    Smoking status: Former     Types: Cigarettes     Quit date: 1/8/2007     Years since quitting: 15.5    Smokeless tobacco: Never    Tobacco comments:     3.5 PPD 14 years ago   Vaping Use    Vaping Use: Never used   Substance Use Topics    Alcohol use: No    Drug use: Not Currently     Comment: 32 years ago, uppers, crystal meth     Allergies   Allergen Reactions    Gabapentin Other (See Comments)     dizzy    Morphine     Morphine And Related Other (See Comments)     Other reaction(s): Confusion  Nightmares     Bupropion Anxiety     Current Facility-Administered Medications   Medication Dose Route Frequency Provider Last Rate Last Admin    dilTIAZem injection 10 mg  10 mg IntraVENous Once IAN Redmond - CNP        metoprolol (LOPRESSOR) injection 3 mg  3 mg IntraVENous Q3H PRN Sahra Sweeney MD        amiodarone (CORDARONE) tablet 400 mg  400 mg Oral TID  Osiel Mcpherson MD        insulin glargine (LANTUS) injection vial 29 Units  29 Units SubCUTAneous BID Krys Tyson MD        insulin lispro (HUMALOG) injection vial 10 Units  10 Units SubCUTAneous TID  Krys Tyson MD        furosemide (LASIX) injection 20 mg  20 mg IntraVENous Once Krys Tyson MD        albuterol sulfate HFA (PROVENTIL;VENTOLIN;PROAIR) 108 (90 Base) MCG/ACT inhaler 2 puff  2 puff Inhalation Q6H PRN Steven Garcia MD        aspirin chewable tablet 81 mg  81 mg Oral Daily Steven Garcia MD        azelastine (ASTELIN) 0.1 % nasal spray 1 spray  1 spray Each Nostril BID Steven Garcia MD   1 spray at 08/08/22 1050    mometasone-formoterol (DULERA) 200-5 MCG/ACT inhaler 2 puff  2 puff Inhalation BID Steven Garcia MD   2 puff at 08/08/22 0915 carvedilol (COREG) tablet 12.5 mg  12.5 mg Oral BID WC Radha Pham MD   12.5 mg at 08/08/22 1026    dicyclomine (BENTYL) capsule 20 mg  20 mg Oral Q6H Radha Pham MD   20 mg at 08/08/22 0958    losartan (COZAAR) tablet 50 mg  50 mg Oral Daily Radha Pham MD   50 mg at 08/08/22 0958    pantoprazole (PROTONIX) tablet 40 mg  40 mg Oral QAM AC Radha Pham MD   40 mg at 08/08/22 0639    tiotropium (SPIRIVA RESPIMAT) 2.5 MCG/ACT inhaler 2 puff  2 puff Inhalation Daily Radha Pham MD   2 puff at 08/08/22 0915    vitamin D (CHOLECALCIFEROL) tablet 1,000 Units  1,000 Units Oral Daily Radha Pham MD   1,000 Units at 08/08/22 0958    sodium chloride flush 0.9 % injection 5-40 mL  5-40 mL IntraVENous 2 times per day Radha Pham MD   10 mL at 08/08/22 1019    sodium chloride flush 0.9 % injection 5-40 mL  5-40 mL IntraVENous PRN Radha Pham MD   10 mL at 08/08/22 0634    0.9 % sodium chloride infusion   IntraVENous PRN Radha Pham MD        ondansetron (ZOFRAN-ODT) disintegrating tablet 4 mg  4 mg Oral Q8H PRN Radha Pham MD        Or    ondansetron (ZOFRAN) injection 4 mg  4 mg IntraVENous Q6H PRN Radha Pham MD        polyethylene glycol (GLYCOLAX) packet 17 g  17 g Oral Daily PRN Radha Pham MD        acetaminophen (TYLENOL) tablet 650 mg  650 mg Oral Q6H PRN Radha Pham MD        Or    acetaminophen (TYLENOL) suppository 650 mg  650 mg Rectal Q6H PRN Radha Pham MD        perflutren lipid microspheres (DEFINITY) injection 1.65 mg  1.5 mL IntraVENous ONCE PRN Radha Pham MD        ampicillin-sulbactam (UNASYN) 3000 mg in 100 mL NS IVPB minibag  3,000 mg IntraVENous Q6H Radha Pham MD   Stopped at 08/08/22 1055    atorvastatin (LIPITOR) tablet 80 mg  80 mg Oral Nightly Radha Pham MD   80 mg at 08/07/22 2130    enoxaparin (LOVENOX) injection 40 mg  40 mg SubCUTAneous Daily Radha Pham MD   40 mg at 08/07/22 2154    clopidogrel (PLAVIX) tablet 75 mg  75 mg Oral Daily Radha Pham MD   75 mg at 08/07/22 2154 dextrose bolus 10% 125 mL  125 mL IntraVENous PRN Toma Covington MD        Or    dextrose bolus 10% 250 mL  250 mL IntraVENous PRN Toma Covington MD        glucagon (rDNA) injection 1 mg  1 mg SubCUTAneous PRN Toma Covington MD        dextrose 10 % infusion   IntraVENous Continuous PRN Toma Covington MD        insulin lispro (HUMALOG) injection vial 0-8 Units  0-8 Units SubCUTAneous TID WC Toma Covington MD   4 Units at 08/08/22 1011    insulin lispro (HUMALOG) injection vial 0-4 Units  0-4 Units SubCUTAneous Nightly Toma Covington MD   4 Units at 08/07/22 2131    nitroGLYCERIN (NITROSTAT) SL tablet 0.4 mg  0.4 mg SubLINGual Q5 Min PRN Toma Covington MD           ROS: 10-14 system review, reviewed with patient's family and/or nurse was unremarkable except mentioned in HPI. Constitutional:   Vitals:    08/08/22 0807 08/08/22 0927 08/08/22 0958 08/08/22 1233   BP:    (!) 155/69   Pulse:   74 71   Resp:    18   Temp:    99.1 °F (37.3 °C)   TempSrc:    Oral   SpO2:  91%  92%   Weight: 174 lb 9.6 oz (79.2 kg)      Height:        General appearance: Confused   Eye: Examination of conjunctiva and lids: No eye lid drooping, no redness or abnormal conjunctival coloration. Examination of pupil and irises: Pupil round, regular and reactive bilaterally direct and consensual. Iris is symmetric. Neck: Neck is supple. No thyromegaly  Cardiovascular: No lower leg edema with good pulsation. No carotid bruit  Neurological: Cranial nerves: Pupil round regular and reactive, extraocular muscle intact, no gaze preference, face is symmetric, uvula midline, tongue is midline. DTRs: Symmetric 2+ throughout arms and legs                          Sensation: No sensory deficit or abnormal sensation                          Plantars: Flexor bilaterally. Musculoskeletal:  The patient can move all 4 extremities. No apparent focal weakness. Normal tone and range of motion. Psychiatric: Poor judgment and insight. Poor orientation, waxing and waning. Easily distracted. Labile affect. Respiratory: Respiratory effort: Normal.  Palpation: No abnormal deformities. Skin: Inspection of the skin: Normal , no abnormal lesion. Palpation: No palpable nodules  ENT: No abnormalities with nasal mucosa. No abnormalities with oropharynx and palate is symmetric    Data:  LABS:   Lab Results   Component Value Date/Time     08/08/2022 04:44 AM    K 5.0 08/08/2022 04:44 AM     08/08/2022 04:44 AM    CO2 16 08/08/2022 04:44 AM    BUN 28 08/08/2022 04:44 AM    CREATININE 1.6 08/08/2022 04:44 AM    GFRAA 51 08/08/2022 04:44 AM    LABGLOM 42 08/08/2022 04:44 AM    GLUCOSE 380 08/08/2022 04:44 AM    PHOS 3.6 05/05/2019 05:42 AM    MG 2.10 05/05/2019 05:42 AM    CALCIUM 8.3 08/08/2022 04:44 AM     Lab Results   Component Value Date/Time    WBC 14.6 08/08/2022 04:44 AM    RBC 4.42 08/08/2022 04:44 AM    HGB 14.7 08/08/2022 04:44 AM    HCT 43.8 08/08/2022 04:44 AM    MCV 99.2 08/08/2022 04:44 AM    RDW 13.3 08/08/2022 04:44 AM    PLT 77 08/08/2022 04:44 AM     Lab Results   Component Value Date    INR 1.44 (H) 08/07/2022    PROTIME 17.5 (H) 08/07/2022       Neuroimaging were independently reviewed by me. Reviewed notes from different physicians  Reviewed lab and blood testing    Impression:  Acute encephalopathy, severe. So far no specific etiology. Possible new ischemic bilateral strokes. Will need MRI brain to exclude such possibility.   Other metabolic encephalopathy etiology should be in our differential.  Diabetes, not controlled  Hypertension, not controlled  Elevated troponin  CHRISTIE      Recommendation:  MRI brain  Carotid Doppler  Echo  Follow troponin  Aspirin  Statin  Insulin sliding scale  A1c  Telemetry  Continue Plavix  DVT and GI prophylaxis  Continue current blood pressure medications  Blood sugar monitor  Neurochecks  Follow creatinine  Metabolic work-up  Discussed with POA nurse  MDM: High due to high risk for stroke recurrence. Thank you for referring such patient. If you have any questions regarding my consult note, please don't hesitate to call me. Steven Mckeon MD  [phone]    This dictation was generated by voice recognition computer software.  Although all attempts are made to edit the dictation for accuracy, there may be errors in the  transcription that are not intended

## 2022-08-08 NOTE — PROGRESS NOTES
Facility/Department: [address]  SLP Clinical Swallow Evaluation and Speech Language Cognitive Assessment     Patient: Gabriel Banuelos   : 1945   MRN: [id number]      Evaluation Date: 2022      Admitting Dx: Pleural effusion [J90]  Abnormal CT of the head [R93.0]  Elevated troponin [R77.8]  Acute CVA (cerebrovascular accident) ([facility] [I63.9]  Altered mental status, unspecified altered mental status type [R41.82]  Pneumonia due to infectious organism, unspecified laterality, unspecified part of lung [J18.9]  Pain: Did not state                                  H&P:   Gabriel Banuelos is a 78 y.o. male with history of CAD s/p CABG, DM 2 on insulin pump, PVD, COPD, chronic systolic CHF who came to ER with altered mental status. Patient lives alone. Has had dysarthria for past few days. No CP, SOB, HA or fevers. Coughing. Has dysphagia. No abdominal pain, nausea, vomiting, diarrhea, melena or hematochezia. Says he is off balance. Found to have BL embolic strokes and NSTEMI in ED. Otherwise complete ROS is negative unless listed above. Imaging:  CT Chest:  2022  No findings to suggest large central pulmonary embolism as discussed above. Large layering left pleural effusion. Partial atelectasis of the left lower lobe. Additional asymmetric   heterogeneous left-sided opacity can reflect atelectasis, pneumonia, or   asymmetric pulmonary edema. Indeterminate 2.5 cm lingular pulmonary nodule versus rounded atelectasis. Mediastinal and hilar adenopathy is also seen. Chest CT follow-up after   treatment and resolution of acute symptoms is recommended as malignancy   cannot be excluded. Sclerosis of the lateral right 6th rib and anterior left 3rd rib which could   be due to healing fractures or metastases. Head CT:  2022  Focal areas of abnormal low attenuation involving left frontal, left   parietal, right occipital, and left cerebellar regions. Findings are   suspicious representing foci of acute/recent ischemic change. Multiplicity   and bilaterality raise the possibility of change associated with underlying   embolic phenomenon. Follow-up MRI examination with diffusion imaging is   recommended for a more complete evaluation. MRI brain with and without contrast:   Ordered       History/Prior Level of Function:   Living Status: Per RN, pt lives with POA. He was alert and oriented at baseline, able to drive himself to appointments. Pt was unable to provide any additional history. Prior Dysphagia History: No known dysphagia history. Pt was previously evaluated for dysphagia (2019) at this facility but discharged on a regular diet without any recommendations for follow up. Prior Speech History: No known speech deficits. Reason for referral: SLP evaluation orders received due to new CVA , difficulty communicating , and cognitive state . DYSPHAGIA BEDSIDE SWALLOW EVALUATION   Dysphagia Impressions/Dysphagia Diagnosis: Oropharyngeal Dysphagia   Pt was positioned Upright in bed  with limited responses to commands. Currently on  6L O2 via nasal cannula . Oxygen saturation maintained 93-94% saturation during evaluation. Pt with shortness of breath throughout. Trials of thin liquids, puree , and regular solids  were provided to assess swallow function. Pt with limited acceptance of intake (no or did not open his oral cavity to ongoing attempts). Thin liquids revealed signs of premature bolus loss and reduced coordination of airway protection. Timing and strength of laryngeal elevation was delayed and required 2 swallows per presentation. Solids revealed prolonged mastication with reduced mastication, he was able to maintain bolus within the oral cavity without evidence of build up.  While no overt s/s of aspiration were observed, pt's incoordination, increased work of breathing, current respiratory status and cognitive state increase his risk for aspiration. Recommend Dysphagia III Soft and bite sized  with Thin liquids, No straws , Meds in puree . Recommended Diet and Intervention:  Diet Solids Recommendation:  Dysphagia III Soft and bite sized  Liquid Consistency Recommendation: Thin liquids, No straws  Recommended form of Meds: Meds in puree    If respiratory status declines, make NPO with ongoing SLP evaluation     Dysphagia Therapeutic Intervention:  Diet Tolerance Monitoring , Patient/Family Education , Therapeutic Trials with SLP     Compensatory Swallowing Strategies: Alternate solids/liquids , Upright as possible with all PO intake , No straws , Assist Feed , Small bites/sips , Eat/feed slowly    Oral Mechanism Exam:  []WFL [x]Mild   [] Moderate  []Severe  []To be assessed  Labial ROM, Labial Symmetry   - Difficult to assess secondary to poor command following; minimal right facial asymmetry was evident     SHORT TERM DYSPHAGIA GOALS  Pt will functionally tolerate recommended diet with no overt clinical s/s of aspiration   Pt will demonstrate understanding of aspiration risk and precautions via education/demonstration with occasional prompting  If clinical s/s of aspiration/penetration continue to be noted, Pt will participate in Modified Barium Swallow Study     Patient Positioning: Upright in bed       SPEECH LANGUAGE COGNITIVE ASSESSMENT:     Speech Diagnosis:   Expressive Aphasia , Receptive Aphasia ,  Dysarthria , Cognitive-Linguistic Deficits     Impressions:   Pt with limited response to auditory comprehension tasks (following single step commands, responding to yes/no questions, object identification) and expressive language tasks (naming, personal history report, initiation, automatic speech). Cognitive deficits were also evident but difficult to discern etiology due to limited responses. Cued attempts (lead in phrases, f/2 choices) had limited effectiveness. Pt with limited attention to visual and verbal stimulation. COMPREHENSION  Auditory Comprehension: Moderate   Impaired Yes/no questions  Impaired One step commands  Impaired Two step commands  Impaired Conversation    EXPRESSION  Verbal Expression: Moderate   Impaired Initiation  Impaired Automatic Speech  Impaired Confrontational Naming  Impaired Thought Organization      Pragmatics/Social Functioning: Moderate   Impaired Eye contact  Flat Affect      MOTOR SPEECH  Motor Speech: To be assessed   Suspect a dysarthria component but difficult to assess due to limited verbal output     VOICE  Voice: To be assessed   Difficult to assess due to limited verbal output     COGNITION    Overall Orientation : Moderate    Disoriented to time   Disoriented to situation   Disoriented to place   Oriented to self    Attention: Moderate  and Severe    Impaired Selective Attention  Impaired Sustained Attention  Impaired Alternating Attention    Memory: To be assessed      - limited recall of orientation information and safety protocol (per chart report)     Problem Solving: To be assessed         Safety/Judgement: Moderate    Comment:  limited use of safety protocol (per chart report)     GOALS:  Short Term Speech/Language/Cognitive Goals:   Pt will improve auditory processing/comprehension of commands and questions to 80%, via graded tasks   Pt will improve orientation and short term recall via graded tasks to 80%  Pt will improve verbal expression for functional expression via graded tasks to 80%  Pt will participate in ongoing cognitive assessment with goals to be established as indicated     Plan of care: 3-5 times per week during acute care stay. Discharge Recommendations:  Recommend ongoing SLP for speech and dysphagia therapy upon discharge from hospital     EDUCATION:   Provided education regarding role of SLP, results of assessment, recommendations and general speech pathology plan of care.    [] Pt verbalized understanding and agreement   [x] Pt requires ongoing learning [] No evidence of comprehension     If patient discharges prior to next visit, this note will serve as discharge. Time code minutes:  0  Total Time:  25 minutes     Electronically signed by:    Karly Rouse M.A. CCC-SLP SNoreenPNoreen [id number]  Speech-Language Pathologist   8/8/2022 9:31 AM

## 2022-08-08 NOTE — PROGRESS NOTES
Pt found trying to get out of bed. HR in the 160's and SpO2 84% on room air. IV metoprolol given per MAR. Pt incontinent of stool. Pt changed and repositioned. Bed alarm on and call light within reach. Pt educated on the importance of calling for assistance.

## 2022-08-08 NOTE — CONSULTS
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 He cannot give any history so history obtained from the chart and from his friend and AUDRA Lino. He has CAD with 2 prior CABG's.  1991 and 2007. He has a h/o severe LV dysfunction, however per [facility] notes EF had improved from 35% to 51% by ECHO 2020. Repeat ECHO however 5/2022 showed EF=25-30%. Due to this cardiac CTA was obtained showing apparent patent grafts to OM, Diag and LAD, but no graft to an occluded RCA. There were also pulmonary nodules that were concerning for possible lung CA. Per his friend he had \"3 tests\" schedule at the [facility] today to evaluate this. He has baseline dementia. His friend lives with him and helps care for him. Per her report he has had poor glucose control. On Friday he was \"Out of it\". His friends thought it may be related to glucose. On Saturday he was the same way. Glucose was low so given glucose but no improvement. He would not follow commands. Finally EMS was called and he was brought to the ER. CT head has raised concern of embolic CVA's. He has had paroxysmal afib on tele requiring cardizem drip. He remains confused and does not answer questions. Troponin has been elevated but no obvious chest pain.      Past Medical History:   has a past medical history of Acute CVA (cerebrovascular accident) ([address] Arthritis, CAD (coronary artery disease), Cancer ([address] Chest pain, COPD (chronic obstructive pulmonary disease) ([address] Diabetes mellitus ([address] Drug use, Emphysema lung ([address] Fibromyalgia, GERD (gastroesophageal reflux disease), Heart attack ([address] Hepatitis, History of open heart surgery, Hyperlipidemia, Hypertension, Peripheral vascular disease ([address] Precancerous skin lesion, and Sleep apnea.    Surgical History:   has a past surgical history that includes eye surgery; Cardiac surgery; and Ankle Closed Reduction. Social History:   reports that he quit smoking about 15 years ago. His smoking use included cigarettes. He has never used smokeless tobacco. He reports that he does not currently use drugs. He reports that he does not drink alcohol. Family History:  family history is not on file. He was adopted. Medications:   dilTIAZem  10 mg IntraVENous Once    metoprolol tartrate  50 mg Oral BID    aspirin  81 mg Oral Daily    azelastine  1 spray Each Nostril BID    mometasone-formoterol  2 puff Inhalation BID    carvedilol  12.5 mg Oral BID WC    dicyclomine  20 mg Oral Q6H    losartan  50 mg Oral Daily    pantoprazole  40 mg Oral QAM AC    tiotropium  2 puff Inhalation Daily    Vitamin D  1,000 Units Oral Daily    sodium chloride flush  5-40 mL IntraVENous 2 times per day    ampicillin-sulbactam  3,000 mg IntraVENous Q6H    atorvastatin  80 mg Oral Nightly    enoxaparin  40 mg SubCUTAneous Daily    clopidogrel  75 mg Oral Daily    insulin lispro  0-8 Units SubCUTAneous TID WC    insulin lispro  0-4 Units SubCUTAneous Nightly    insulin glargine  25 Units SubCUTAneous BID    insulin lispro  5 Units SubCUTAneous TID WC         Allergies:  Gabapentin, Morphine, Morphine and related, and Bupropion     [x] Medications and dosages reviewed. ROS:  [x]Full ROS obtained and negative except as mentioned in HPI      Physical Examination:    Vitals:    08/08/22 0958   BP: 116/77   Pulse: 74   Resp:    Temp: 99   SpO2:         GENERAL: Elderly chronically ill appearing male who has eyes open but does not respond.  No acute distress  NEUROLOGICAL: not Alert and oriented  PSYCH:slightly anxious  affect  SKIN: Warm and dry, No visible rash,   EYES: Pupils equal and round, Sclera non-icteric,   HENT:  External ears and nose unremarkable, mucus membranes moist  MUSCULOSKELETAL: Normal cephalic, neck supple  CAROTID: Normal upstroke, no bruits  CARDIAC: JVP normal, enlarged PMI, regular rate and rhythm, normal S1S2, no murmur, rub, or gallop  RESPIRATORY: Normal respiratory effort, clear to auscultation bilaterally  EXTREMITIES: No LE edema  GASTROINTESTINAL: normal bowel sounds, soft, non-tender, No hepatomegaly     All testing and labs listed below were personally reviewed. LABS  WBC14.6 High K/uL RBC4.42M/uL Jnelbeddiq32.7g/dL Lkejzpfiib86.8% MCV99.2fL MCH33.1pg MCHC33.4g/dL RDW13.3% Bzzouekmm03 Low K/uL     Troponin 0.60 High Panic  ng/mL     Ehsttm708bulh/L Potassium reflex Magnesium5. 0mmol/L Ldefntoq140pgij/L  Low mmol/L Anion Gap19 High  Lduqfwa000 High mg/dL BUN28 High mg/dL Creatinine1.6 High mg/dL GFR Non- Uhwegvmm30 Abnormal       Latest Reference Range & Units 8/7/22 11:04 8/7/22 17:26 8/7/22 22:58 8/8/22 04:44   Troponin <0.01 ng/mL 0.74 (HH) 0.43 (H) 0.32 (H) 0.60 (HH)     ECHO 2019   Summary   -Normal left ventricle size. There is concentric left ventricular   hypertrophy. Ejection fraction is visually estimated to be 35%. There is   There is inferior-lateral hypokinesis. hypokinesis. Grade III diastolic   dysfunction with elevated LV filling pressures. -Mild to moderate mitral regurgitation.   -The left atrium is dilated. -Aortic valve appears sclerotic but opens adequately. -Mild tricuspid regurgitation with PASP of 35 mmHg. -IVC size is dilated (>2.1 cm) but collapses > 50% with respiration   consistent with elevated RA pressure (8 mmHg). CARDIAC CATH 1991  Patent SVG =>RCA  Occluded native RCA  50-60% LCX      CARDIAC CTA 6/2022  2 solid pulmonary nodules on the left, possibly lung carcinoma given the   underlying emphysema. Small mediastinal nodes are seen which could be   reactive or metastatic. .  These nodules have increased in size compared to   2019       Visualized bypass grafts supplying the obtuse marginal branch, left anterior   descending, and diagonal branch appear patent with the reservation that the   graft insertion on the diagonal branch is poorly visualized secondary to   motion. The native right coronary artery is diminutive in caliber with scant areas of   flow particular in the proximal 3rd, suggesting flow limiting stenosis. No   bypass graft is seen supplying the right coronary artery. The native LAD and   circumflex are heavily diseased     CT CHEST 8/7/2022  No findings to suggest large central pulmonary embolism as discussed above. Large layering left pleural effusion. Partial atelectasis of the left lower lobe. Additional asymmetric   heterogeneous left-sided opacity can reflect atelectasis, pneumonia, or   asymmetric pulmonary edema. Indeterminate 2.5 cm lingular pulmonary nodule versus rounded atelectasis. Mediastinal and hilar adenopathy is also seen. Chest CT follow-up after   treatment and resolution of acute symptoms is recommended as malignancy   cannot be excluded. Sclerosis of the lateral right 6th rib and anterior left 3rd rib which could   be due to healing fractures or metastases. CT HEAD 8/7/2022  Focal areas of abnormal low attenuation involving left frontal, left   parietal, right occipital, and left cerebellar regions. Findings are   suspicious representing foci of acute/recent ischemic change. Multiplicity   and bilaterality raise the possibility of change associated with underlying   embolic phenomenon. Follow-up MRI examination with diffusion imaging is   recommended for a more complete evaluation. PET CT BRAIN 6/2021    Regional hypometabolism, most notably within the anterior temporal and   bilateral parietal regions, can be in keeping with a pattern of Alzheimer's   disease. Correlate with neurologic testing and clinical exam.     Tele (personally reviewed)  Paroxysmal atrial fib, currently in sinus    ASSESSMENT:    PAF:  PAF that is hard to control when he is in it.  This is likely the etiology of apparent embolic CVA's. Anticoagulation would be indicated for PAF but unclear if safe with CVA's Awaiting MRI and neuro eval. To try to maintain sinus I will load amiodarone. CAD:  Known severe CAD. CTA as above. No clinical evidence of unstable angina. He is not in condition for other ischemic evaluation at this time    Cardiomyopathy:  Severe LV dysfunction on [facility]. Will repeat for details. Continue coreg. He was also on entresto 24/26 two pills BID at home    Elevated troponin:  No evidence of acute MI. This could be NQWMI related to stress from tachycardia in setting of several CAD. Possible supply demand ischemia or due to CVA's. Medical therapy. No indication for cardiac cath and not a candidate for that at this time. CVA's  Apparent CVA's. MRI and neuro conslt pending    Plan:  Amio load  Continue coreg, now on cozaar, consider restarting entresto if BP allows  ECHO  MRI and neuro eval    Pt with multiple severe medical issues and prognosis is poor. Discussed with POA.      Thank you for allowing me to participate in the care of this individual.      Mirella Hope M.D., [facility] [place]

## 2022-08-08 NOTE — CONSULTS
PULMONARY AND CRITICAL CARE MEDICINE CONSULTATION NOTE    CONSULTING PHYSICIAN:  Farzaneh Avila MD    ADMISSION DATE: 8/7/2022  ADMISSION DIAGNOSIS: Pleural effusion [J90]  Abnormal CT of the head [R93.0]  Elevated troponin [R77.8]  Acute CVA (cerebrovascular accident) ([facility] [address] [I63.9]  Altered mental status, unspecified altered mental status type [R41.82]  Pneumonia due to infectious organism, unspecified laterality, unspecified part of lung [J18.9]    REASON FOR CONSULT:   Chief Complaint   Patient presents with    Altered Mental Status     Pt with AMS x few days per family, in by EMS, pt alert to self and place, disorientated to situation or time, pt inuslin pump per EMS pt has been having trouble, bs 200s       DATE OF CONSULT: 8/7/2022    HISTORY OF PRESENT ILLNESS: 78y.o. year old male with a past medical history significant for severe ischemic cardiomyopathy with a EF of 25 to 30%, coronary artery disease, paroxysmal atrial fibrillation, COPD, hypertension, peripheral vascular disease, sleep apnea, GERD who presented to the hospital with altered mental status. CT head without contrast showed focal areas of hypodensity suspicious for acute CVA. Patient has been started on dual antiplatelet therapy. Also found to be tachypneic and hypoxic. Chest imaging showed left-sided pleural effusion. Overnight patient remained tachycardic and found to be in A. fib with RVR. Given both amnio and Cardizem. Now rate controlled. Plan to start him on anticoagulation. Pulmonary consulted for left-sided pleural effusion. At the time of interview patient lying in bed in mild respiratory distress. Able to communicate but remains lethargic. Oxygen requirements currently at 6 L/min. Overnight has remained confused and trying to get out of bed. Currently has 1:1 sitter. REVIEW OF SYSTEMS:   14 point ROS could not be obtained due to patient factors. Patient confused.     PAST MEDICAL HISTORY:   Past Medical History: Diagnosis Date    Acute CVA (cerebrovascular accident) ([facility] [address]     Arthritis     CAD (coronary artery disease)     Cancer (HCC)     Chest pain     COPD (chronic obstructive pulmonary disease) (HCC)     Diabetes mellitus (HCC)     pump patient    Drug use     Hx of crystal meth, cocaine per patient     Emphysema lung ([facility] [address]     Fibromyalgia     takes Lyrica for fibromyalgia and diabetic nerve pain    GERD (gastroesophageal reflux disease)     Heart attack ([facility] [address] 1991    1st heart attack (MI) 1991, 2nd heart attack (MI) pt does not recall    Hepatitis     Hep A    History of open heart surgery     Triple Vessel Disease at [place] ([facility])    Hyperlipidemia     Hypertension     Peripheral vascular disease ([facility] [address]     Precancerous skin lesion     not active; pt sees skin specialist    Sleep apnea        PAST SURGICAL HISTORY:   Past Surgical History:   Procedure Laterality Date    ANKLE CLOSED REDUCTION      CARDIAC SURGERY      single vessel CABG in 1999    EYE SURGERY      cataracts       SOCIAL HISTORY:   Social History     Tobacco Use    Smoking status: Former     Types: Cigarettes     Quit date: 1/8/2007     Years since quitting: 15.5    Smokeless tobacco: Never    Tobacco comments:     3.5 PPD 14 years ago   Vaping Use    Vaping Use: Never used   Substance Use Topics    Alcohol use: No    Drug use: Not Currently     Comment: 32 years ago, uppers, crystal meth       FAMILY HISTORY:   Family History   Adopted: Yes       MEDICATIONS:     No current facility-administered medications on file prior to encounter. Current Outpatient Medications on File Prior to Encounter   Medication Sig Dispense Refill    spironolactone (ALDACTONE) 25 MG tablet Take 25 mg by mouth in the morning. alendronate (FOSAMAX) 70 MG tablet Take 70 mg by mouth every 7 days      furosemide (LASIX) 20 MG tablet Take 20 mg by mouth in the morning and 20 mg before bedtime.       furosemide (LASIX) 20 MG tablet Take 20 mg by mouth 2 times daily albuterol sulfate  (90 Base) MCG/ACT inhaler Inhale 2 puffs into the lungs every 6 hours as needed for Wheezing 1 Inhaler 3    losartan (COZAAR) 50 MG tablet Take 1 tablet by mouth daily 30 tablet 3    furosemide (LASIX) 20 MG tablet Take 1 tablet by mouth daily for 10 days 10 tablet 0    Umeclidinium Bromide 62.5 MCG/INH AEPB Inhale 62.5 mcg into the lungs      budesonide-formoterol (SYMBICORT) 160-4.5 MCG/ACT AERO Inhale 2 puffs into the lungs 2 times daily 1 Inhaler 3    atorvastatin (LIPITOR) 40 MG tablet Take 20 mg by mouth nightly      Azelastine HCl 137 MCG/SPRAY SOLN 1 spray by Nasal route 2 times daily Indications: 1 spray each nare      carvedilol (COREG) 12.5 MG tablet Take 1 tablet by mouth 2 times daily (with meals) (Patient taking differently: Take 25 mg by mouth in the morning and 25 mg in the evening.  Take with meals.) 60 tablet 3    alprostadil (EDEX) 40 MCG injection 40 mcg by Intracavitary route as needed for Erectile Dysfunction use no more than 3 times per week      Insulin Pump Accessories MISC by Does not apply route Basal  7822-6922= 1.9 units/hr  5411-2223=2.05 units/hr  4344-1891=1.4 units/hr  6688-2055=1.8 units/hr     Carb ratio 5.5  Sens 22:1  Goal 8745-3602= 100-120           8778-2306= 120-140      omeprazole (PRILOSEC) 20 MG delayed release capsule Take 20 mg by mouth daily      aspirin 81 MG tablet Take 81 mg by mouth daily      vitamin D (CHOLECALCIFEROL) 1000 UNIT TABS tablet Take 1,000 Units by mouth daily      dicyclomine (BENTYL) 20 MG tablet Take 20 mg by mouth every 6 hours          dilTIAZem  10 mg IntraVENous Once    amiodarone  400 mg Oral TID WC    insulin glargine  29 Units SubCUTAneous BID    insulin lispro  10 Units SubCUTAneous TID WC    furosemide  20 mg IntraVENous Once    aspirin  81 mg Oral Daily    azelastine  1 spray Each Nostril BID    mometasone-formoterol  2 puff Inhalation BID    carvedilol  12.5 mg Oral BID WC    dicyclomine  20 mg Oral Q6H losartan  50 mg Oral Daily    pantoprazole  40 mg Oral QAM AC    tiotropium  2 puff Inhalation Daily    Vitamin D  1,000 Units Oral Daily    sodium chloride flush  5-40 mL IntraVENous 2 times per day    ampicillin-sulbactam  3,000 mg IntraVENous Q6H    atorvastatin  80 mg Oral Nightly    enoxaparin  40 mg SubCUTAneous Daily    clopidogrel  75 mg Oral Daily    insulin lispro  0-8 Units SubCUTAneous TID WC    insulin lispro  0-4 Units SubCUTAneous Nightly      sodium chloride      dextrose       metoprolol, albuterol sulfate HFA, sodium chloride flush, sodium chloride, ondansetron **OR** ondansetron, polyethylene glycol, acetaminophen **OR** acetaminophen, perflutren lipid microspheres, dextrose bolus **OR** dextrose bolus, glucagon (rDNA), dextrose, nitroGLYCERIN      ALLERGIES:   Allergies as of 08/07/2022 - Fully Reviewed 08/07/2022   Allergen Reaction Noted    Gabapentin Other (See Comments) 03/08/2019    Morphine  03/08/2019    Morphine and related Other (See Comments) 03/09/2015    Bupropion Anxiety 03/08/2019      OBJECTIVE:   height is 5' 9\" (1.753 m) and weight is 174 lb 9.6 oz (79.2 kg). His oral temperature is 99.1 °F (37.3 °C). His blood pressure is 155/69 (abnormal) and his pulse is 71. His respiration is 18 and oxygen saturation is 92%. No intake/output data recorded. PHYSICAL EXAM:    CONSTITUTIONAL: He is a 78y.o.-year-old who appears his stated age. He is alert and oriented x 3 and in mild respiratory distress. HEENT: PERRLA, EOMI. No scleral icterus. No thrush, atraumatic, normocephalic. NECK: Supple, without cervical or supraclavicular lymphadenopathy:  CARDIOVASCULAR: S1 S2 RRR. Without murmer  RESPIRATORY & CHEST: Decreased breath sounds in the left side of the chest.  No wheezing or crackles heard. GASTROINTESTINAL & ABDOMEN: Soft, nontender, positive bowel sounds in all quadrants, non-distended, without hepatosplenomegaly. GENITOURINARY: Deferred.    MUSCULOSKELETAL: No tenderness to palpation of the axial skeleton. There is no clubbing. No cyanosis. No edema of the lower extremities. SKIN OF BODY: No rash or jaundice. PSYCHIATRIC EVALUATION: Normal affect. Patient answers questions appropriately. HEMATOLOGIC/LYMPHATIC/ IMMUNOLOGIC: No palpable lymphadenopathy. NEUROLOGIC: Alert and oriented x 3. Groslly non-focal. Motor strength is 5+/5 in all muscle groups. The patient has a normal sensorium globally. LABS:  Recent Labs     08/07/22  1103 08/07/22  1104 08/08/22  0444   WBC  --  12.4* 14.6*   HGB  --  14.0 14.7   HCT  --  42.0 43.8   PLT  --  96* 77*   ALT  --  17  --    AST  --  52*  --    NA  --  136 136   K  --  4.7 5.0   CL  --  104 101   CREATININE  --  1.4* 1.6*   BUN  --  30* 28*   CO2  --  22 16*   INR 1.44*  --   --        Recent Labs     08/07/22  1104 08/08/22  0444   GLUCOSE 323* 380*   CALCIUM 9.0 8.3    136   K 4.7 5.0   CO2 22 16*    101   BUN 30* 28*   CREATININE 1.4* 1.6*       No results for input(s): PHART, FYU2LCP, PO2ART, RRD7JMQ, B3KWEEXE, BEART, U2YRIUHI in the last 72 hours.     Lab Results   Component Value Date    INR 1.44 (H) 08/07/2022    INR 1.13 05/01/2019    INR 1.18 (H) 04/20/2019    PROTIME 17.5 (H) 08/07/2022    PROTIME 12.9 05/01/2019    PROTIME 13.4 (H) 04/20/2019     No results found for: AMYLASE   Lab Results   Component Value Date    LABA1C 7.3 08/07/2022     Lab Results   Component Value Date    .8 08/07/2022     No results found for: TSH, I4IZWNV, V0CDHAI, THYROIDAB, FT3, T4FREE  Lab Results   Component Value Date    CKTOTAL 716 (H) 08/07/2022    TROPONINI 0.60 ([facility]) 08/08/2022      No results found for: CRP   No results found for: BNP   No results found for: DDIMER   Lab Results   Component Value Date    FERRITIN 256.6 03/12/2019      Lab Results   Component Value Date    LACTA 1.4 07/15/2020           IMAGING:    Narrative   EXAMINATION:   CTA OF THE CHEST 8/7/2022 11:51 am       TECHNIQUE:   CTA of the chest was performed after the administration of intravenous   contrast.  Multiplanar reformatted images are provided for review. MIP   images are provided for review. Automated exposure control, iterative   reconstruction, and/or weight based adjustment of the mA/kV was utilized to   reduce the radiation dose to as low as reasonably achievable. COMPARISON:   CT chest dated 05/01/2019       HISTORY:   ORDERING SYSTEM PROVIDED HISTORY: elevated trop - ro PE   TECHNOLOGIST PROVIDED HISTORY:   Reason for exam:->elevated trop - ro PE   Decision Support Exception - unselect if not a suspected or confirmed   emergency medical condition->Emergency Medical Condition (MA)   Reason for Exam: Altered Mental Status (Pt with AMS x few days per family, in   by EMS, pt alert to self and place, disorientated to situation or time, pt   inuslin pump per EMS pt has been having trouble, bs 200s)       FINDINGS:   Pulmonary Arteries: Within the main, central most right, central most left   pulmonary arteries, no evidence of filling defect to suggest large central   pulmonary embolism. Evaluation beyond the level of the suyapa is nondiagnostic   secondary to marked artifact. Mediastinum: Status post median sternotomy. Calcification of the thoracic   aorta. No aortic intraluminal flap. Coronary artery calcification. Mediastinal and bilateral hilar adenopathy is demonstrated. Pretracheal   lymph node for example measures approximately 1.4 cm in short axis. Precarinal lymph node is 1.4 cm. Right hilar lymph node for example measures   3.1 x 2.3 cm. Thyroid is symmetric. No axillary adenopathy. Lungs/pleura: Large layering left pleural effusion is seen. Emphysema is   present. Respiratory motion artifact noted. Partial atelectasis of the left   lower lobe. There is heterogeneous and ground-glass opacity within the   aerated portion of the left lower lobe.   Opacity is seen within the posterior   left upper lobe which is heterogeneous. Nodular consolidation is seen at the   lingula measuring 2.5 x 1.7 cm. No pneumothorax. Upper Abdomen: Cholelithiasis. Calcified granulomas within the liver and   spleen. Soft Tissues/Bones: Irregular sclerosis and periosteal reaction at the   lateral right 6th rib. Similar finding is seen at the anterior left 3rd rib. Degenerative change of the thoracic spine. Impression   No findings to suggest large central pulmonary embolism as discussed above. Large layering left pleural effusion. Partial atelectasis of the left lower lobe. Additional asymmetric   heterogeneous left-sided opacity can reflect atelectasis, pneumonia, or   asymmetric pulmonary edema. Indeterminate 2.5 cm lingular pulmonary nodule versus rounded atelectasis. Mediastinal and hilar adenopathy is also seen. Chest CT follow-up after   treatment and resolution of acute symptoms is recommended as malignancy   cannot be excluded. Sclerosis of the lateral right 6th rib and anterior left 3rd rib which could   be due to healing fractures or metastases. IMPRESSION:     Altered mental status  Possible acute CVA  A. fib with RVR  Severe ischemic cardiomyopathy  Acute hypoxic respiratory failure  Left-sided pleural effusion  Acute kidney injury  Previous history of tobacco and polysubstance abuse  Left lung atelectasis versus infiltrate  Mediastinal/hilar lymphadenopathy    RECOMMENDATION:     Patient is presented to the hospital with altered mental status. Highly likely that patient has suffered with acute CVA. Also has a history of CVA in the past.  Currently awaiting MRI brain to establish the diagnosis. Also seem to be having left-sided moderate to large pleural effusion. I performed a bedside thoracic ultrasound to assess the amount and nature of pleural effusion. Representative images as below:    Left pleural space. Lung margins seen at 9 o'clock position.   Diaphragm with spleen underneath seen at 3 o'clock position. Intervening there is moderate to large sized pleural effusion seen. Most likely etiology includes heart failure related fluid accumulation. I will arrange for an IR guided thoracentesis today for both diagnostic and therapeutic purposes. Pleural fluid will be sent for cytological, microbiological and biochemical testing. Also seen to have left lung infiltrate with associated hilar and mediastinal lymphadenopathy. If pleural fluid cytology is unable to establish a conclusive diagnosis, patient may eventually need a bronchoscopic evaluation. Currently he is having various cardiovascular and neurological issues which need to be settled before bronchoscopy can be attempted. Patient is chronically ill and now having multiorgan dysfunction. It may be beneficial to get palliative care involved to discuss goals of care and CODE STATUS with health POA. Thank you for your consultation. We will continue to follow the patient. Rohan Duggan MD  Pulmonary Critical Care and Sleep Medicine  8/7/2022, 12:48 PM    This note was completed using dragon medical speech recognition software. Grammatical errors, random word insertions, pronoun errors and incomplete sentences are occasional consequences of this technology due to software limitations. If there are questions or concerns about the content of this note of information contained within the body of this dictation they should be addressed with the provider for clarification.

## 2022-08-09 PROBLEM — N18.9 ACUTE KIDNEY INJURY SUPERIMPOSED ON CKD (HCC): Status: ACTIVE | Noted: 2018-06-08

## 2022-08-09 LAB
ANION GAP SERPL CALCULATED.3IONS-SCNC: 12 MMOL/L (ref 3–16)
BASE EXCESS ARTERIAL: -1 MMOL/L (ref -3–3)
BASOPHILS ABSOLUTE: 0 K/UL (ref 0–0.2)
BASOPHILS RELATIVE PERCENT: 0 %
BUN BLDV-MCNC: 38 MG/DL (ref 7–20)
CALCIUM SERPL-MCNC: 8.9 MG/DL (ref 8.3–10.6)
CARBOXYHEMOGLOBIN ARTERIAL: 1.3 % (ref 0–1.5)
CHLORIDE BLD-SCNC: 108 MMOL/L (ref 99–110)
CO2: 22 MMOL/L (ref 21–32)
CREAT SERPL-MCNC: 1.5 MG/DL (ref 0.8–1.3)
EKG ATRIAL RATE: 288 BPM
EKG DIAGNOSIS: NORMAL
EKG P AXIS: 70 DEGREES
EKG Q-T INTERVAL: 364 MS
EKG QRS DURATION: 98 MS
EKG QTC CALCULATION (BAZETT): 419 MS
EKG R AXIS: 71 DEGREES
EKG T AXIS: 98 DEGREES
EKG VENTRICULAR RATE: 80 BPM
EOSINOPHILS ABSOLUTE: 0 K/UL (ref 0–0.6)
EOSINOPHILS RELATIVE PERCENT: 0 %
ESTIMATED AVERAGE GLUCOSE: 159.9 MG/DL
GFR AFRICAN AMERICAN: 55
GFR NON-AFRICAN AMERICAN: 46
GLUCOSE BLD-MCNC: 146 MG/DL (ref 70–99)
GLUCOSE BLD-MCNC: 152 MG/DL (ref 70–99)
GLUCOSE BLD-MCNC: 156 MG/DL (ref 70–99)
GLUCOSE BLD-MCNC: 168 MG/DL (ref 70–99)
GLUCOSE BLD-MCNC: 172 MG/DL (ref 70–99)
GLUCOSE BLD-MCNC: 242 MG/DL (ref 70–99)
HBA1C MFR BLD: 7.2 %
HCO3 ARTERIAL: 21.6 MMOL/L (ref 21–29)
HCT VFR BLD CALC: 41.8 % (ref 40.5–52.5)
HEMOGLOBIN, ART, EXTENDED: 14.3 G/DL (ref 13.5–17.5)
HEMOGLOBIN: 14.1 G/DL (ref 13.5–17.5)
LV EF: 33 %
LVEF MODALITY: NORMAL
LYMPHOCYTES ABSOLUTE: 1.3 K/UL (ref 1–5.1)
LYMPHOCYTES RELATIVE PERCENT: 8.5 %
MCH RBC QN AUTO: 33.4 PG (ref 26–34)
MCHC RBC AUTO-ENTMCNC: 33.6 G/DL (ref 31–36)
MCV RBC AUTO: 99.3 FL (ref 80–100)
METHEMOGLOBIN ARTERIAL: 0.2 %
MONOCYTES ABSOLUTE: 1.1 K/UL (ref 0–1.3)
MONOCYTES RELATIVE PERCENT: 7.2 %
NEUTROPHILS ABSOLUTE: 13 K/UL (ref 1.7–7.7)
NEUTROPHILS RELATIVE PERCENT: 84.3 %
O2 SAT, ARTERIAL: 93.7 %
O2 THERAPY: ABNORMAL
PCO2 ARTERIAL: 29.7 MMHG (ref 35–45)
PDW BLD-RTO: 13.5 % (ref 12.4–15.4)
PERFORMED ON: ABNORMAL
PH ARTERIAL: 7.47 (ref 7.35–7.45)
PLATELET # BLD: 73 K/UL (ref 135–450)
PMV BLD AUTO: 9 FL (ref 5–10.5)
PO2 ARTERIAL: 62.8 MMHG (ref 75–108)
POTASSIUM REFLEX MAGNESIUM: 4.1 MMOL/L (ref 3.5–5.1)
RBC # BLD: 4.21 M/UL (ref 4.2–5.9)
SODIUM BLD-SCNC: 142 MMOL/L (ref 136–145)
TCO2 ARTERIAL: 50.5 MMOL/L
WBC # BLD: 15.4 K/UL (ref 4–11)

## 2022-08-09 PROCEDURE — 6370000000 HC RX 637 (ALT 250 FOR IP): Performed by: INTERNAL MEDICINE

## 2022-08-09 PROCEDURE — 93010 ELECTROCARDIOGRAM REPORT: CPT | Performed by: INTERNAL MEDICINE

## 2022-08-09 PROCEDURE — 36415 COLL VENOUS BLD VENIPUNCTURE: CPT

## 2022-08-09 PROCEDURE — 99223 1ST HOSP IP/OBS HIGH 75: CPT | Performed by: HOSPITALIST

## 2022-08-09 PROCEDURE — 2580000003 HC RX 258

## 2022-08-09 PROCEDURE — 87040 BLOOD CULTURE FOR BACTERIA: CPT

## 2022-08-09 PROCEDURE — 2700000000 HC OXYGEN THERAPY PER DAY

## 2022-08-09 PROCEDURE — 2580000003 HC RX 258: Performed by: INTERNAL MEDICINE

## 2022-08-09 PROCEDURE — 99233 SBSQ HOSP IP/OBS HIGH 50: CPT | Performed by: PSYCHIATRY & NEUROLOGY

## 2022-08-09 PROCEDURE — 93005 ELECTROCARDIOGRAM TRACING: CPT | Performed by: INTERNAL MEDICINE

## 2022-08-09 PROCEDURE — 2060000000 HC ICU INTERMEDIATE R&B

## 2022-08-09 PROCEDURE — 80048 BASIC METABOLIC PNL TOTAL CA: CPT

## 2022-08-09 PROCEDURE — 82803 BLOOD GASES ANY COMBINATION: CPT

## 2022-08-09 PROCEDURE — 85025 COMPLETE CBC W/AUTO DIFF WBC: CPT

## 2022-08-09 PROCEDURE — 6360000002 HC RX W HCPCS: Performed by: INTERNAL MEDICINE

## 2022-08-09 PROCEDURE — 36600 WITHDRAWAL OF ARTERIAL BLOOD: CPT

## 2022-08-09 PROCEDURE — 94760 N-INVAS EAR/PLS OXIMETRY 1: CPT

## 2022-08-09 PROCEDURE — 93306 TTE W/DOPPLER COMPLETE: CPT

## 2022-08-09 PROCEDURE — 6360000002 HC RX W HCPCS: Performed by: PHYSICIAN ASSISTANT

## 2022-08-09 PROCEDURE — 99233 SBSQ HOSP IP/OBS HIGH 50: CPT | Performed by: INTERNAL MEDICINE

## 2022-08-09 RX ORDER — METOPROLOL TARTRATE 5 MG/5ML
2.5 INJECTION INTRAVENOUS EVERY 6 HOURS PRN
Status: DISCONTINUED | OUTPATIENT
Start: 2022-08-09 | End: 2022-08-10 | Stop reason: HOSPADM

## 2022-08-09 RX ORDER — BACLOFEN 10 MG/1
10 TABLET ORAL ONCE
Status: DISCONTINUED | OUTPATIENT
Start: 2022-08-09 | End: 2022-08-09

## 2022-08-09 RX ORDER — SODIUM CHLORIDE 9 MG/ML
INJECTION, SOLUTION INTRAVENOUS
Status: COMPLETED
Start: 2022-08-09 | End: 2022-08-09

## 2022-08-09 RX ORDER — ORPHENADRINE CITRATE 30 MG/ML
60 INJECTION INTRAMUSCULAR; INTRAVENOUS ONCE
Status: COMPLETED | OUTPATIENT
Start: 2022-08-09 | End: 2022-08-09

## 2022-08-09 RX ADMIN — SODIUM CHLORIDE 3000 MG: 900 INJECTION INTRAVENOUS at 15:15

## 2022-08-09 RX ADMIN — SODIUM CHLORIDE 250 ML: 9 INJECTION, SOLUTION INTRAVENOUS at 03:37

## 2022-08-09 RX ADMIN — ORPHENADRINE CITRATE 60 MG: 30 INJECTION INTRAMUSCULAR; INTRAVENOUS at 21:53

## 2022-08-09 RX ADMIN — ACETAMINOPHEN 650 MG: 650 SUPPOSITORY RECTAL at 13:17

## 2022-08-09 RX ADMIN — Medication 10 ML: at 09:18

## 2022-08-09 RX ADMIN — SODIUM CHLORIDE 3000 MG: 900 INJECTION INTRAVENOUS at 09:26

## 2022-08-09 RX ADMIN — SODIUM CHLORIDE 3000 MG: 900 INJECTION INTRAVENOUS at 03:35

## 2022-08-09 RX ADMIN — SODIUM CHLORIDE 3000 MG: 900 INJECTION INTRAVENOUS at 21:48

## 2022-08-09 RX ADMIN — Medication 10 ML: at 21:49

## 2022-08-09 RX ADMIN — AZELASTINE HYDROCHLORIDE 1 SPRAY: 137 SPRAY, METERED NASAL at 09:26

## 2022-08-09 RX ADMIN — AZELASTINE HYDROCHLORIDE 1 SPRAY: 137 SPRAY, METERED NASAL at 21:49

## 2022-08-09 RX ADMIN — SODIUM CHLORIDE 25 ML/HR: 9 INJECTION, SOLUTION INTRAVENOUS at 21:48

## 2022-08-09 RX ADMIN — ACETAMINOPHEN 650 MG: 650 SUPPOSITORY RECTAL at 19:14

## 2022-08-09 RX ADMIN — INSULIN GLARGINE 29 UNITS: 100 INJECTION, SOLUTION SUBCUTANEOUS at 21:49

## 2022-08-09 RX ADMIN — INSULIN GLARGINE 25 UNITS: 100 INJECTION, SOLUTION SUBCUTANEOUS at 09:57

## 2022-08-09 ASSESSMENT — PAIN SCALES - WONG BAKER
WONGBAKER_NUMERICALRESPONSE: 0

## 2022-08-09 ASSESSMENT — PAIN SCALES - GENERAL
PAINLEVEL_OUTOF10: 0
PAINLEVEL_OUTOF10: 0

## 2022-08-09 NOTE — PROGRESS NOTES
PULMONARY AND CRITICAL CARE MEDICINE PROGRESS NOTE    Subjective: Patient remains mostly unresponsive. Does open his eyes to noxious stimuli. Remains on 2 L/min of oxygen supplementation saturating in low to mid 90s. Underwent therapeutic thoracentesis 1.5 L of fluid removed yesterday. Now spiking fevers. MRI brain showing acute CVA. Also remains in paroxysmal A. fib. REVIEW OF SYSTEMS:   14 point ROS could not be obtained due to patient factors. Patient unresponsive.     PAST MEDICAL HISTORY:   Past Medical History:   Diagnosis Date    Acute CVA (cerebrovascular accident) ([address]     Arthritis     CAD (coronary artery disease)     Cancer ([address]     Chest pain     COPD (chronic obstructive pulmonary disease) (HCC)     Diabetes mellitus (HCC)     pump patient    Drug use     Hx of crystal meth, cocaine per patient     Emphysema lung ([address]     Fibromyalgia     takes Lyrica for fibromyalgia and diabetic nerve pain    GERD (gastroesophageal reflux disease)     Heart attack ([address] 1991    1st heart attack (MI) 1991, 2nd heart attack (MI) pt does not recall    Hepatitis     Hep A    History of open heart surgery     Triple Vessel Disease at [address] [facility] ([address] [place] [address])    Hyperlipidemia     Hypertension     Peripheral vascular disease ([address]     Precancerous skin lesion     not active; pt sees skin specialist    Sleep apnea        PAST SURGICAL HISTORY:   Past Surgical History:   Procedure Laterality Date    ANKLE CLOSED REDUCTION      CARDIAC SURGERY      single vessel CABG in 1999    EYE SURGERY      cataracts       SOCIAL HISTORY:   Social History     Tobacco Use    Smoking status: Former     Types: Cigarettes     Quit date: 1/8/2007     Years since quitting: 15.5    Smokeless tobacco: Never    Tobacco comments:     3.5 PPD 14 years ago   Vaping Use    Vaping Use: Never used   Substance Use Topics    Alcohol use: No    Drug use: Not Currently     Comment: 32 years ago, eliane crystal meth       FAMILY HISTORY: Family History   Adopted: Yes       MEDICATIONS:     No current facility-administered medications on file prior to encounter. Current Outpatient Medications on File Prior to Encounter   Medication Sig Dispense Refill    spironolactone (ALDACTONE) 25 MG tablet Take 25 mg by mouth in the morning. alendronate (FOSAMAX) 70 MG tablet Take 70 mg by mouth every 7 days      furosemide (LASIX) 20 MG tablet Take 20 mg by mouth in the morning and 20 mg before bedtime. furosemide (LASIX) 20 MG tablet Take 20 mg by mouth 2 times daily      albuterol sulfate  (90 Base) MCG/ACT inhaler Inhale 2 puffs into the lungs every 6 hours as needed for Wheezing 1 Inhaler 3    losartan (COZAAR) 50 MG tablet Take 1 tablet by mouth daily 30 tablet 3    furosemide (LASIX) 20 MG tablet Take 1 tablet by mouth daily for 10 days 10 tablet 0    Umeclidinium Bromide 62.5 MCG/INH AEPB Inhale 62.5 mcg into the lungs      budesonide-formoterol (SYMBICORT) 160-4.5 MCG/ACT AERO Inhale 2 puffs into the lungs 2 times daily 1 Inhaler 3    atorvastatin (LIPITOR) 40 MG tablet Take 20 mg by mouth nightly      Azelastine HCl 137 MCG/SPRAY SOLN 1 spray by Nasal route 2 times daily Indications: 1 spray each nare      carvedilol (COREG) 12.5 MG tablet Take 1 tablet by mouth 2 times daily (with meals) (Patient taking differently: Take 25 mg by mouth in the morning and 25 mg in the evening.  Take with meals.) 60 tablet 3    alprostadil (EDEX) 40 MCG injection 40 mcg by Intracavitary route as needed for Erectile Dysfunction use no more than 3 times per week      Insulin Pump Accessories MISC by Does not apply route Basal  7679-4518= 1.9 units/hr  8860-5010=2.05 units/hr  9222-2291=1.4 units/hr  3859-6821=1.8 units/hr     Carb ratio 5.5  Sens 22:1  Goal 1694-6102= 100-120           5090-1296= 120-140      omeprazole (PRILOSEC) 20 MG delayed release capsule Take 20 mg by mouth daily      aspirin 81 MG tablet Take 81 mg by mouth daily      vitamin D (CHOLECALCIFEROL) 1000 UNIT TABS tablet Take 1,000 Units by mouth daily      dicyclomine (BENTYL) 20 MG tablet Take 20 mg by mouth every 6 hours          dilTIAZem  10 mg IntraVENous Once    amiodarone  400 mg Oral TID WC    insulin glargine  29 Units SubCUTAneous BID    insulin lispro  10 Units SubCUTAneous TID     insulin lispro  0-16 Units SubCUTAneous TID     aspirin  81 mg Oral Daily    azelastine  1 spray Each Nostril BID    mometasone-formoterol  2 puff Inhalation BID    carvedilol  12.5 mg Oral BID WC    dicyclomine  20 mg Oral Q6H    [Held by provider] losartan  50 mg Oral Daily    pantoprazole  40 mg Oral QAM AC    tiotropium  2 puff Inhalation Daily    Vitamin D  1,000 Units Oral Daily    sodium chloride flush  5-40 mL IntraVENous 2 times per day    ampicillin-sulbactam  3,000 mg IntraVENous Q6H    atorvastatin  80 mg Oral Nightly    enoxaparin  40 mg SubCUTAneous Daily    clopidogrel  75 mg Oral Daily    insulin lispro  0-4 Units SubCUTAneous Nightly      sodium chloride 25 mL/hr at 08/09/22 0922    dextrose       metoprolol, albuterol sulfate HFA, sodium chloride flush, sodium chloride, ondansetron **OR** ondansetron, polyethylene glycol, acetaminophen **OR** acetaminophen, perflutren lipid microspheres, dextrose bolus **OR** dextrose bolus, glucagon (rDNA), dextrose, nitroGLYCERIN      ALLERGIES:   Allergies as of 08/07/2022 - Fully Reviewed 08/07/2022   Allergen Reaction Noted    Gabapentin Other (See Comments) 03/08/2019    Morphine  03/08/2019    Morphine and related Other (See Comments) 03/09/2015    Bupropion Anxiety 03/08/2019      OBJECTIVE:   height is 5' 9\" (1.753 m) and weight is 168 lb 1.6 oz (76.2 kg). His axillary temperature is 102.6 °F (39.2 °C) (abnormal). His blood pressure is 173/92 (abnormal) and his pulse is 79. His respiration is 20 and oxygen saturation is 94%. No intake/output data recorded.      PHYSICAL EXAM:    CONSTITUTIONAL: He is a 78y.o.-year-old who appears his stated age. He is unresponsive to verbal or tactile stimuli. With noxious stimuli he does open his eyes but does not follow commands. HEENT: PERRLA, EOMI. No scleral icterus. No thrush, atraumatic, normocephalic. NECK: Supple, without cervical or supraclavicular lymphadenopathy:  CARDIOVASCULAR: S1 S2 RRR. Without murmer  RESPIRATORY & CHEST: Decreased breath sounds in the left side of the chest.  No wheezing or crackles heard. GASTROINTESTINAL & ABDOMEN: Soft, nontender, positive bowel sounds in all quadrants, non-distended, without hepatosplenomegaly. GENITOURINARY: Deferred. MUSCULOSKELETAL: No tenderness to palpation of the axial skeleton. There is no clubbing. No cyanosis. No edema of the lower extremities. SKIN OF BODY: No rash or jaundice. PSYCHIATRIC EVALUATION: Normal affect. Patient answers questions appropriately. HEMATOLOGIC/LYMPHATIC/ IMMUNOLOGIC: No palpable lymphadenopathy. NEUROLOGIC: Unresponsive to verbal or tactile stimuli. Barely opens his eyes to noxious stimuli.       LABS:  Recent Labs     08/07/22  1103 08/07/22  1104 08/08/22 0444 08/09/22  0536   WBC  --  12.4* 14.6* 15.4*   HGB  --  14.0 14.7 14.1   HCT  --  42.0 43.8 41.8   PLT  --  96* 77* 73*   CHOL  --   --  125  --    TRIG  --   --  149  --    HDL  --   --  27*  --    ALT  --  17  --   --    AST  --  52*  --   --    NA  --  136 136 142   K  --  4.7 5.0 4.1   CL  --  104 101 108   CREATININE  --  1.4* 1.6* 1.5*   BUN  --  30* 28* 38*   CO2  --  22 16* 22   INR 1.44*  --   --   --        Recent Labs     08/07/22  1104 08/08/22  0444 08/09/22  0536   GLUCOSE 323* 380* 172*   CALCIUM 9.0 8.3 8.9    136 142   K 4.7 5.0 4.1   CO2 22 16* 22    101 108   BUN 30* 28* 38*   CREATININE 1.4* 1.6* 1.5*       Recent Labs     08/09/22  0910   PHART 7.470*   YAP4WLI 29.7*   PO2ART 62.8*   VWR6FJZ 21.6   E7CBELDJ 93.7   BEART -1.0       Lab Results   Component Value Date    INR 1.44 (H) 08/07/2022    INR 1.13 05/01/2019 INR 1.18 (H) 04/20/2019    PROTIME 17.5 (H) 08/07/2022    PROTIME 12.9 05/01/2019    PROTIME 13.4 (H) 04/20/2019     No results found for: AMYLASE   Lab Results   Component Value Date    LABA1C 7.2 08/08/2022     Lab Results   Component Value Date    .9 08/08/2022     No results found for: TSH, B7MZHIE, D8NYPQL, THYROIDAB, FT3, T4FREE  Lab Results   Component Value Date    CKTOTAL 716 (H) 08/07/2022    TROPONINI 0.60 ([place]) 08/08/2022      No results found for: CRP   No results found for: BNP   No results found for: DDIMER   Lab Results   Component Value Date    FERRITIN 256.6 03/12/2019      Lab Results   Component Value Date    LACTA 1.4 07/15/2020           IMAGING:    Narrative   EXAMINATION:   CTA OF THE CHEST 8/7/2022 11:51 am       TECHNIQUE:   CTA of the chest was performed after the administration of intravenous   contrast.  Multiplanar reformatted images are provided for review. MIP   images are provided for review. Automated exposure control, iterative   reconstruction, and/or weight based adjustment of the mA/kV was utilized to   reduce the radiation dose to as low as reasonably achievable. COMPARISON:   CT chest dated 05/01/2019       HISTORY:   ORDERING SYSTEM PROVIDED HISTORY: elevated trop - ro PE   TECHNOLOGIST PROVIDED HISTORY:   Reason for exam:->elevated trop - ro PE   Decision Support Exception - unselect if not a suspected or confirmed   emergency medical condition->Emergency Medical Condition (MA)   Reason for Exam: Altered Mental Status (Pt with AMS x few days per family, in   by EMS, pt alert to self and place, disorientated to situation or time, pt   inuslin pump per EMS pt has been having trouble, bs 200s)       FINDINGS:   Pulmonary Arteries: Within the main, central most right, central most left   pulmonary arteries, no evidence of filling defect to suggest large central   pulmonary embolism.   Evaluation beyond the level of the suyapa is nondiagnostic   secondary to marked artifact. Mediastinum: Status post median sternotomy. Calcification of the thoracic   aorta. No aortic intraluminal flap. Coronary artery calcification. Mediastinal and bilateral hilar adenopathy is demonstrated. Pretracheal   lymph node for example measures approximately 1.4 cm in short axis. Precarinal lymph node is 1.4 cm. Right hilar lymph node for example measures   3.1 x 2.3 cm. Thyroid is symmetric. No axillary adenopathy. Lungs/pleura: Large layering left pleural effusion is seen. Emphysema is   present. Respiratory motion artifact noted. Partial atelectasis of the left   lower lobe. There is heterogeneous and ground-glass opacity within the   aerated portion of the left lower lobe. Opacity is seen within the posterior   left upper lobe which is heterogeneous. Nodular consolidation is seen at the   lingula measuring 2.5 x 1.7 cm. No pneumothorax. Upper Abdomen: Cholelithiasis. Calcified granulomas within the liver and   spleen. Soft Tissues/Bones: Irregular sclerosis and periosteal reaction at the   lateral right 6th rib. Similar finding is seen at the anterior left 3rd rib. Degenerative change of the thoracic spine. Impression   No findings to suggest large central pulmonary embolism as discussed above. Large layering left pleural effusion. Partial atelectasis of the left lower lobe. Additional asymmetric   heterogeneous left-sided opacity can reflect atelectasis, pneumonia, or   asymmetric pulmonary edema. Indeterminate 2.5 cm lingular pulmonary nodule versus rounded atelectasis. Mediastinal and hilar adenopathy is also seen. Chest CT follow-up after   treatment and resolution of acute symptoms is recommended as malignancy   cannot be excluded. Sclerosis of the lateral right 6th rib and anterior left 3rd rib which could   be due to healing fractures or metastases.            IMPRESSION:     Altered mental status  Acute CVA  A. fib with RVR  Severe ischemic cardiomyopathy  Acute hypoxic respiratory failure  Left-sided pleural effusion status postthoracentesis  Acute kidney injury  Previous history of tobacco and polysubstance abuse  Left lung atelectasis versus infiltrate  Mediastinal/hilar lymphadenopathy    RECOMMENDATION:     Patient presented to the hospital with altered mental status. Felt to have embolic CVA based on MRI findings. He has had paroxysmal atrial fibrillation. Most likely the source of embolic CVA. Also seen to have left-sided moderate to large pleural effusion. Underwent therapeutic thoracentesis yesterday with 1.5 L of fluid removed. Fluid characteristics are that of a borderline exudate. Pleural fluid cultures and cytology still pending. Patient now spiking high-grade fevers. Unclear if due to infectious causes or neurogenic causes. Remains on IV Unasyn. I will send for blood cultures. Urinalysis 2 days ago was within normal limits. Continue with Tylenol as needed. Also seen to have left lung infiltrate with associated hilar and mediastinal lymphadenopathy. If pleural fluid cytology is unable to establish a conclusive diagnosis, patient may eventually need a bronchoscopic evaluation. Currently he is having various cardiovascular and neurological issues which need to be settled before bronchoscopy can be attempted. Patient is chronically ill and now having multiorgan dysfunction. CODE STATUS has been changed to [facility]. Prognosis remains guarded. We will continue to follow the patient. Yue Rod MD  Pulmonary Critical Care and Sleep Medicine  8/7/2022, 1:41 PM    This note was completed using dragon medical speech recognition software. Grammatical errors, random word insertions, pronoun errors and incomplete sentences are occasional consequences of this technology due to software limitations.  If there are questions or concerns about the content of this note of information contained within the body of this dictation they should be addressed with the provider for clarification.

## 2022-08-09 NOTE — PROGRESS NOTES
Physical/Occupational Therapy  Jeanne Joseph    Orders received, chart reviewed. Attempted PT/OT evaluation this date. Per RN, pt is on hold d/t lethargy and difficulties with following one step commands to open eyes. Will re-attempt evaluation tomorrow as schedule allows and as pt becomes medically appropriate.    Thank you,   Shari Salgado, OTR/L, [id number]   [address] [facility], DPT [id number]

## 2022-08-09 NOTE — CONSULTS
Office: [phone]       Fax: [phone]      Nephrology Initial Consult Note        Patient's Name: Lisset Holder Date: 8/7/2022  Date of Visit: 8/9/2022    Reason for Consult: CHRISTIE  Requesting Physician:  Alonzo Leonard MD  PCP: [facility]    Chief Complaint:  confusion     History of Present Illness:      Ofelia Francis is a 78 y.o. male with PMHx of hypertension,DM, CAD, CHF, COPD who was hospitalized on 8/7/2022 with complaints of confusion  Family in room  Pt unable to give history  Has progressive decline. PO intake ws poor at home  No diarrhea, nausea or vomiting   NSAID use: none  IV contrast: None recent   Home meds reviewed    Past Medical History:   Diagnosis Date    Acute CVA (cerebrovascular accident) ([address]     Arthritis     CAD (coronary artery disease)     Cancer ([address]     Chest pain     COPD (chronic obstructive pulmonary disease) (HCC)     Diabetes mellitus (HCC)     pump patient    Drug use     Hx of crystal meth, cocaine per patient     Emphysema lung ([address]     Fibromyalgia     takes Lyrica for fibromyalgia and diabetic nerve pain    GERD (gastroesophageal reflux disease)     Heart attack ([address] 1991    1st heart attack (MI) 1991, 2nd heart attack (MI) pt does not recall    Hepatitis     Hep A    History of open heart surgery     Triple Vessel Disease at [place] ([facility])    Hyperlipidemia     Hypertension     Peripheral vascular disease ([address]     Precancerous skin lesion     not active; pt sees skin specialist    Sleep apnea        Past Surgical History:   Procedure Laterality Date    ANKLE CLOSED REDUCTION      CARDIAC SURGERY      single vessel CABG in 1999    EYE SURGERY      cataracts       Family History   Adopted: Yes        reports that he quit smoking about 15 years ago. His smoking use included cigarettes.  He has never used smokeless tobacco. He reports that he does not currently use drugs. He reports that he does not drink alcohol. Medications: Allergies:  Gabapentin, Morphine, Morphine and related, and Bupropion    Scheduled Meds:   dilTIAZem  10 mg IntraVENous Once    amiodarone  400 mg Oral TID WC    insulin glargine  29 Units SubCUTAneous BID    insulin lispro  10 Units SubCUTAneous TID     insulin lispro  0-16 Units SubCUTAneous TID     aspirin  81 mg Oral Daily    azelastine  1 spray Each Nostril BID    mometasone-formoterol  2 puff Inhalation BID    carvedilol  12.5 mg Oral BID WC    dicyclomine  20 mg Oral Q6H    losartan  50 mg Oral Daily    pantoprazole  40 mg Oral QAM AC    tiotropium  2 puff Inhalation Daily    Vitamin D  1,000 Units Oral Daily    sodium chloride flush  5-40 mL IntraVENous 2 times per day    ampicillin-sulbactam  3,000 mg IntraVENous Q6H    atorvastatin  80 mg Oral Nightly    enoxaparin  40 mg SubCUTAneous Daily    clopidogrel  75 mg Oral Daily    insulin lispro  0-4 Units SubCUTAneous Nightly     Continuous Infusions:   sodium chloride 25 mL/hr at 08/08/22 1545    dextrose         Labs:  CBC:   Recent Labs     08/07/22  1104 08/08/22  0444 08/09/22  0536   WBC 12.4* 14.6* 15.4*   HGB 14.0 14.7 14.1   PLT 96* 77* 73*     Ca/Mg/Phos:   Recent Labs     08/07/22  1104 08/08/22  0444 08/09/22  0536   CALCIUM 9.0 8.3 8.9     UA:  Recent Labs     08/07/22  1104   COLORU Yellow   CLARITYU Clear   GLUCOSEU 500*   BILIRUBINUR Negative   KETUA 15*   SPECGRAV >=1.030   BLOODU SMALL*   PHUR 6.5  6.5   PROTEINU 30*   UROBILINOGEN 1.0   NITRU Negative   LEUKOCYTESUR Negative   LABMICR YES   URINETYPE NotGiven      Urine Microscopic:   Recent Labs     08/07/22  1104   BACTERIA None Seen   HYALCAST 1   WBCUA 0   RBCUA 3   EPIU 0     Urine Chemistry: No results for input(s): CLUR, LABCREA, PROTEINUR, NAUR in the last 72 hours.       ROS:     All systems couldn't be reviewed due to patient's condition. Objective:     Vitals: BP (!) 167/81   Pulse 82   Temp 99.5 °F (37.5 °C) (Oral)   Resp 20   Ht 5' 9\" (1.753 m)   Wt 168 lb 1.6 oz (76.2 kg)   SpO2 94%   BMI 24.82 kg/m²    Wt Readings from Last 3 Encounters:   08/09/22 168 lb 1.6 oz (76.2 kg)   06/06/22 162 lb (73.5 kg)   06/07/21 176 lb (79.8 kg)      24HR INTAKE/OUTPUT:  No intake or output data in the 24 hours ending 08/09/22 0910  Constitutional:  somnolent NAD  HEENT:  MMD, No icterus  Neck: no bruits, No JVD  Cardiovascular:  reg rhythm  Respiratory: Diminished at bases   Abdomen:  +BS, soft, NT, ND  Ext: no lower extremity edema  Psychiatric: mood and affect unable to assess  Skin: dry/intact  CNS: opens eyes, non verbal no agitation    IMAGING:  MRI BRAIN WO CONTRAST   Preliminary Result   Multiple areas of restricted diffusion throughout the supratentorial and   infratentorial brain as discussed above suggestive of acute areas of infarct. Metastatic disease is felt to be less likely. Short-term follow-up is   suggested. The marrow signal of the clivus and upper cervical vertebral bodies are low   on T1-weighted images. This is a nonspecific finding. This sometimes can be   normal in a young patient. Sometimes anemia or marrow infiltrative processes   can have this finding. A patient in an immunocompromised state can have this   appearance to the marrow. Clinically correlate. VL DUP CAROTID BILATERAL         XR CHEST 1 VIEW   Final Result   No pneumothorax, following left thoracentesis. Small residual left pleural effusion. Basilar atelectasis. IR GUIDED THORACENTESIS PLEURAL   Final Result   Successful ultrasound guided thoracentesis. CT CHEST PULMONARY EMBOLISM W CONTRAST   Final Result   No findings to suggest large central pulmonary embolism as discussed above. Large layering left pleural effusion. Partial atelectasis of the left lower lobe.   Additional asymmetric   heterogeneous left-sided opacity can reflect atelectasis, pneumonia, or   asymmetric pulmonary edema. Indeterminate 2.5 cm lingular pulmonary nodule versus rounded atelectasis. Mediastinal and hilar adenopathy is also seen. Chest CT follow-up after   treatment and resolution of acute symptoms is recommended as malignancy   cannot be excluded. Sclerosis of the lateral right 6th rib and anterior left 3rd rib which could   be due to healing fractures or metastases. CT CERVICAL SPINE WO CONTRAST   Final Result   No acute abnormality of the cervical spine. Multilevel degenerative disc disease and facet arthropathy as outlined above. Left pleural effusion. See CT of the chest of the same date for complete   details. CT HEAD WO CONTRAST   Preliminary Result   Focal areas of abnormal low attenuation involving left frontal, left   parietal, right occipital, and left cerebellar regions. Findings are   suspicious representing foci of acute/recent ischemic change. Multiplicity   and bilaterality raise the possibility of change associated with underlying   embolic phenomenon. Follow-up MRI examination with diffusion imaging is   recommended for a more complete evaluation. Critical results were called by Dr. Pilar Zapata. [place] Nance MD to PALMA Martinez on 8/7/2022 at 12:18. XR ANKLE RIGHT (MIN 3 VIEWS)   Final Result   No acute osseous abnormality of the right ankle. XR CHEST PORTABLE   Final Result   Central vascular congestion with perihilar edema consistent with CHF. Left   pleural effusion with dependent opacification, possibly atelectasis or   infiltrate.              Assessment :     1. CHRISTIE on CKD 3  -Non-Oliguric  -Baseline creat: 1.3-1.4   -UA: prot ++  -Volume: Hypervolemic   -Electrolytes: No Dyskalemia      Recent Labs     08/07/22  1104 08/08/22  0444 08/09/22  0536   BUN 30* 28* 38*   CREATININE 1.4* 1.6* 1.5*     Recent Labs     08/07/22  1104 08/08/22  0444 08/09/22  0536    136 142   K 4.7 5.0 4.1   CO2 22 16* 22         2. HTN  -Blood pressure high    BP Readings from Last 1 Encounters:   08/09/22 (!) 167/81       3. DM    4. CAD/CHF  - TTE (2019): LVEF 35%, G3DD    5. Pleural effusion  status post thoracenteses 1.5 L    6. Encephalopathy  -suspected CVA    Plan:     - Diuresis based on vol status   - hold aldactone  - optimize BP meds  - GOC discussion  - Monitor BMP    -Monitor I/O, UOP  -Maintain MAP>65  -Avoid nephrotoxin, if able. -Dose meds to current eGFR    Thank you for allowing us to participate in care of [facility] . We will continue to follow. Feel free to contact me with any questions.       Aurelia Storm MD  8/9/2022    [facility] [address] [place] [address] S  Office : [phone]  Fax :[phone]

## 2022-08-09 NOTE — PROGRESS NOTES
[address]   Progress Note  Cardiology    CC:  Ischemic cardiomyopathy, paroxysmal atrial fib, CVAs    HPI:  Unresponsive. Mostly in sinus. Some very short PAF,PAT      Medications/Labs all Reviewed    Lab Results   Component Value Date    WBC 15.4 (H) 08/09/2022    HGB 14.1 08/09/2022    HCT 41.8 08/09/2022    MCV 99.3 08/09/2022    PLT 73 (L) 08/09/2022     Lab Results   Component Value Date    CREATININE 1.5 (H) 08/09/2022    BUN 38 (H) 08/09/2022     08/09/2022    K 4.1 08/09/2022     08/09/2022    CO2 22 08/09/2022     Lab Results   Component Value Date    INR 1.44 (H) 08/07/2022    PROTIME 17.5 (H) 08/07/2022        PHYSICAL EXAM   BP (!) 167/81   Pulse 82   Temp 99.5 °F (37.5 °C) (Oral)   Resp 20   Ht 5' 9\" (1.753 m)   Wt 168 lb 1.6 oz (76.2 kg)   SpO2 94%   BMI 24.82 kg/m²      Respiratory:  Resp Assessment: Normal respiratory effort  Resp Auscultation: Clear to auscultation bilaterally   Cardiovascular: Auscultation: regular rate and rhythm, normal S1S2, no murmur, rub or gallop  Palpation:  Nl PMI  JVP:  normal  Extremities: No Edema  Abdomen:  Soft, non-tender  Normal bowel sounds  Extremities:   No Cyanosis or Clubbing  Neurological/Psychiatric:  unresponsive  Non-anxious  Skin:   Warm and dry    TELE; (tracings personally reviewed)  Sinus, a few short episodes PAF    EKG -personally reviewed  Artifact, NSR, RBBB, IMI    ECHO   -Moderate-severely diminished left ventricular systolic function with an   estimated ejection fraction of 30-35%. Global hypokinesis with inferior   akinesis. Abnormal motion of the distal septum and apex. -Grade II diastolic dysfunction with elevated LV filling pressures.   -Borderline right ventricular systolic function with a TAPSE of 1.59 cm and   RV s' velocity of 10. 1. cm/s.   -The aortic valve appears sclerotic but there is no significant gradient.   -The mitral valve is thickened with normal excursion.  Moderate mitral   annular calcification. Mild to moderate mitral regurgitation. MRI BRAIN:  Multiple areas of restricted diffusion throughout the supratentorial and   infratentorial brain as discussed above suggestive of acute areas of infarct. Metastatic disease is felt to be less likely. Short-term follow-up is   suggested. The marrow signal of the clivus and upper cervical vertebral bodies are low   on T1-weighted images. This is a nonspecific finding. This sometimes can be   normal in a young patient. Sometimes anemia or marrow infiltrative processes   can have this finding. A patient in an immunocompromised state can have this   appearance to the marrow. Clinically correlate. ASSESSMENT:     PAF:  PAF that is hard to control when he is in it. This is likely the etiology of apparent embolic CVA's. Anticoagulation would be indicated for PAF but unclear if safe with CVA's Awaiting  neuro recs. Loading amio to try to maintain sinus. Continue 400 TID for a few days and then 400 BID x 1 week followed by 400 q day. CAD:  Known severe CAD. CTA as above. No clinical evidence of unstable angina. He is not in condition for other ischemic evaluation at this time     Cardiomyopathy:  Severe LV dysfunction with EF=30-35%. Continue coreg and cozaar. He was also on entresto 24/26 two pills BID at home     Elevated troponin:  No evidence of acute MI. This could be NQWMI related to stress from tachycardia in setting of several CAD. Possible supply demand ischemia or due to CVA's. Medical therapy. No indication for cardiac cath and not a candidate for that at this time. CVA's  Apparent CVA's. MRI and neuro recs pending. Unresponsive.  Prognosis seems poor     Plan:  Continue Amio load-daily EKG for QT  Continue coreg, now on cozaar, consider restarting entresto if BP allows  Await neuro recs on safety of anticoagulation    Ashley Julio MD, 8/9/2022 9:57 AM

## 2022-08-09 NOTE — PLAN OF CARE
POA updated on plan of care. Pt VSS, but only responds to voice at times with occasional opening of eyes. Unable to follow commands at this time, all attending physicians aware. Problem: Discharge Planning  Goal: Discharge to home or other facility with appropriate resources  Outcome: Not Progressing  Flowsheets (Taken 8/9/2022 0945)  Discharge to home or other facility with appropriate resources: Refer to discharge planning if patient needs post-hospital services based on physician order or complex needs related to functional status, cognitive ability or social support system     Problem: Pain  Goal: Verbalizes/displays adequate comfort level or baseline comfort level  Outcome: Not Progressing     Problem: Safety - Adult  Goal: Free from fall injury  Outcome: Not Progressing     Problem: Skin/Tissue Integrity  Goal: Absence of new skin breakdown  Description: 1. Monitor for areas of redness and/or skin breakdown  2. Assess vascular access sites hourly  3. Every 4-6 hours minimum:  Change oxygen saturation probe site  4. Every 4-6 hours:  If on nasal continuous positive airway pressure, respiratory therapy assess nares and determine need for appliance change or resting period.   Outcome: Not Progressing     Problem: Chronic Conditions and Co-morbidities  Goal: Patient's chronic conditions and co-morbidity symptoms are monitored and maintained or improved  Outcome: Not Progressing  Flowsheets (Taken 8/9/2022 0945)  Care Plan - Patient's Chronic Conditions and Co-Morbidity Symptoms are Monitored and Maintained or Improved: Monitor and assess patient's chronic conditions and comorbid symptoms for stability, deterioration, or improvement     Problem: ABCDS Injury Assessment  Goal: Absence of physical injury  Outcome: Not Progressing

## 2022-08-09 NOTE — PROGRESS NOTES
Vj Mcknight  Neurology Follow-up  [facility] Neurology    Date of Service: 8/9/2022    Subjective:   CC: Follow up today regarding: Acute stroke    Events noted. Chart and lab reviewed. The patient remains to be obtunded. He is currently by himself. Discussed with his nurse. He opens eyes to voice and goes back to sleep. MRI reviewed and showed multiple areas of new ischemic stroke bilaterally more right PCA distribution the left with some hemorrhagic component. Other review of system was limited    ROS : A 10-12 system review obtained from discussion with patient's family/and or nurse, was unremarkable. family history is not on file. He was adopted.     Past Medical History:   Diagnosis Date    Acute CVA (cerebrovascular accident) ([address]     Arthritis     CAD (coronary artery disease)     Cancer ([address]     Chest pain     COPD (chronic obstructive pulmonary disease) (HCC)     Diabetes mellitus (HCC)     pump patient    Drug use     Hx of crystal meth, cocaine per patient     Emphysema lung ([address]     Fibromyalgia     takes Lyrica for fibromyalgia and diabetic nerve pain    GERD (gastroesophageal reflux disease)     Heart attack ([address] 1991    1st heart attack (MI) 1991, 2nd heart attack (MI) pt does not recall    Hepatitis     Hep A    History of open heart surgery     Triple Vessel Disease at [place] ([address])    Hyperlipidemia     Hypertension     Peripheral vascular disease ([address]     Precancerous skin lesion     not active; pt sees skin specialist    Sleep apnea      Current Facility-Administered Medications   Medication Dose Route Frequency Provider Last Rate Last Admin    metoprolol (LOPRESSOR) injection 2.5 mg  2.5 mg IntraVENous Q6H PRN Roverto Boyce MD        dilTIAZem injection 10 mg  10 mg IntraVENous Once Ernesto Washburn APRN - CNP        amiodarone (CORDARONE) tablet 400 mg  400 mg Oral TID MARIA L Garcia MD        insulin glargine (LANTUS) injection vial 29 Units  29 Units SubCUTAneous BID Sharalyn Spurling, MD   25 Units at 08/09/22 0957    insulin lispro (HUMALOG) injection vial 10 Units  10 Units SubCUTAneous TID  Sharalyn Spurling, MD   10 Units at 08/08/22 1725    insulin lispro (HUMALOG) injection vial 0-16 Units  0-16 Units SubCUTAneous TID  Sharalyn Spurling, MD        albuterol sulfate HFA (PROVENTIL;VENTOLIN;PROAIR) 108 (90 Base) MCG/ACT inhaler 2 puff  2 puff Inhalation Q6H PRN Lea Milian MD        aspirin chewable tablet 81 mg  81 mg Oral Daily Lea Milian MD        azelastine (ASTELIN) 0.1 % nasal spray 1 spray  1 spray Each Nostril BID Lea Milian MD   1 spray at 08/09/22 0926    mometasone-formoterol (DULERA) 200-5 MCG/ACT inhaler 2 puff  2 puff Inhalation BID Lea Milian MD   2 puff at 08/08/22 2129    carvedilol (COREG) tablet 12.5 mg  12.5 mg Oral BID  Lea Milian MD   12.5 mg at 08/08/22 1026    dicyclomine (BENTYL) capsule 20 mg  20 mg Oral Q6H Lea Milian MD   20 mg at 08/08/22 0958    [Held by provider] losartan (COZAAR) tablet 50 mg  50 mg Oral Daily Lea Milian MD   50 mg at 08/08/22 0958    pantoprazole (PROTONIX) tablet 40 mg  40 mg Oral QAM  Lea Milian MD   40 mg at 08/08/22 0639    tiotropium (SPIRIVA RESPIMAT) 2.5 MCG/ACT inhaler 2 puff  2 puff Inhalation Daily Lea Milian MD   2 puff at 08/08/22 0915    vitamin D (CHOLECALCIFEROL) tablet 1,000 Units  1,000 Units Oral Daily Lea Milian MD   1,000 Units at 08/08/22 0958    sodium chloride flush 0.9 % injection 5-40 mL  5-40 mL IntraVENous 2 times per day Lea Milian MD   10 mL at 08/09/22 0918    sodium chloride flush 0.9 % injection 5-40 mL  5-40 mL IntraVENous PRN Lea Milian MD   10 mL at 08/08/22 0634    0.9 % sodium chloride infusion   IntraVENous PRN Lea Milian MD 25 mL/hr at 08/09/22 0922 Restarted at 08/09/22 0922    ondansetron (ZOFRAN-ODT) disintegrating tablet 4 mg  4 mg Oral Q8H PRN Lea Milian MD        Or    ondansetron (ZOFRAN) injection 4 mg  4 mg IntraVENous Q6H PRN Stephanie Wyatt MD        polyethylene glycol (GLYCOLAX) packet 17 g  17 g Oral Daily PRN Stephanie Wyatt MD        acetaminophen (TYLENOL) tablet 650 mg  650 mg Oral Q6H PRN Stephanie Wyatt MD        Or    acetaminophen (TYLENOL) suppository 650 mg  650 mg Rectal Q6H PRN Stephanie Wyatt MD   650 mg at 08/09/22 1317    perflutren lipid microspheres (DEFINITY) injection 1.65 mg  1.5 mL IntraVENous ONCE PRN Stephanie Wyatt MD        ampicillin-sulbactam (UNASYN) 3000 mg in 100 mL NS IVPB minibag  3,000 mg IntraVENous Q6H Stephanie Wyatt MD   Stopped at 08/09/22 1000    atorvastatin (LIPITOR) tablet 80 mg  80 mg Oral Nightly Stephanie Wyatt MD   80 mg at 08/07/22 2130    enoxaparin (LOVENOX) injection 40 mg  40 mg SubCUTAneous Daily Stephanie Wyatt MD   40 mg at 08/07/22 2154    clopidogrel (PLAVIX) tablet 75 mg  75 mg Oral Daily Stephanie Wyatt MD   75 mg at 08/07/22 2154    dextrose bolus 10% 125 mL  125 mL IntraVENous PRN Stephanie Wyatt MD        Or    dextrose bolus 10% 250 mL  250 mL IntraVENous PRN Stephanie Wyatt MD        glucagon (rDNA) injection 1 mg  1 mg SubCUTAneous PRN Stephanie Wyatt MD        dextrose 10 % infusion   IntraVENous Continuous PRN Stephanie Wyatt MD        insulin lispro (HUMALOG) injection vial 0-4 Units  0-4 Units SubCUTAneous Nightly Stephanie Wyatt MD   4 Units at 08/08/22 2115    nitroGLYCERIN (NITROSTAT) SL tablet 0.4 mg  0.4 mg SubLINGual Q5 Min PRN Stephanie Wyatt MD         Allergies   Allergen Reactions    Gabapentin Other (See Comments)     dizzy    Morphine     Morphine And Related Other (See Comments)     Other reaction(s): Confusion  Nightmares     Bupropion Anxiety      reports that he quit smoking about 15 years ago. His smoking use included cigarettes. He has never used smokeless tobacco. He reports that he does not currently use drugs. He reports that he does not drink alcohol.          Objective:  Constitutional:   Vitals:    08/09/22 0556 08/09/22 0722 08/09/22 1155 08/09/22 1314   BP:  (!) 167/81 (!) 173/75 (!) 173/92   Pulse:  82 79    Resp:  20 20    Temp:  99.5 °F (37.5 °C) 100.2 °F (37.9 °C) (!) 102.6 °F (39.2 °C)   TempSrc:  Oral Axillary Axillary   SpO2:  94% 94%    Weight: 168 lb 1.6 oz (76.2 kg)      Height:           General appearance: Confused, he opens eyes to voice and goes back to sleep  Mental Status:   Patient is drowsy  Opens eyes to voice and goes back to sleep  Poor attention and nonverbal  Does not follow direction  Cranial Nerves:   II: Pupils: equal, round, reactive to light  III,IV,VI: No gaze preference. No nystagmus  V: Facial sensation: Grossly unremarkable. VII: Facial strength and movements: intact and symmetric  XII: Tongue movements : midline  Musculoskeletal: He can move his arms or legs in response to pain  Normal tone  Upgoing toes bilaterally  No abnormal movement        Data:  LABS:   Lab Results   Component Value Date/Time     08/09/2022 05:36 AM    K 4.1 08/09/2022 05:36 AM     08/09/2022 05:36 AM    CO2 22 08/09/2022 05:36 AM    BUN 38 08/09/2022 05:36 AM    CREATININE 1.5 08/09/2022 05:36 AM    GFRAA 55 08/09/2022 05:36 AM    LABGLOM 46 08/09/2022 05:36 AM    GLUCOSE 172 08/09/2022 05:36 AM    PHOS 3.6 05/05/2019 05:42 AM    MG 2.10 05/05/2019 05:42 AM    CALCIUM 8.9 08/09/2022 05:36 AM     Lab Results   Component Value Date/Time    WBC 15.4 08/09/2022 05:36 AM    RBC 4.21 08/09/2022 05:36 AM    HGB 14.1 08/09/2022 05:36 AM    HCT 41.8 08/09/2022 05:36 AM    MCV 99.3 08/09/2022 05:36 AM    RDW 13.5 08/09/2022 05:36 AM    PLT 73 08/09/2022 05:36 AM     Lab Results   Component Value Date    INR 1.44 (H) 08/07/2022    PROTIME 17.5 (H) 08/07/2022       Neuroimaging and labs were independently reviewed by me  Reviewed notes from different physicians. Impression:  Acute encephalopathy secondary to new ischemic strokes affecting bilateral hemisphere more right than left with hemorrhagic component in his right PCA stroke. Not improving. Severe.   Hypertension, not controlled  Diabetes  Hyperlipidemia  Elevated troponin      Recommendation  Continue current supportive care  PT, OT and speech  Aspiration precautions  Aspirin p.o./GA  Continue antibiotics for concern of aspiration pneumonia  Insulin sliding scale  Diabetic control  Blood pressure monitor and control  Avoid blood pressure below 140/90  Telemetry  DVT and GI prophylaxis  Poor prognosis giving history of persistent encephalopathy and large ischemic stroke  Will discuss with his POA further care if he is not making significant improvement over the next 2 to 3 days    MDM: [address] [place] [address] MD Catina   [phone]      This dictation was generated by voice recognition computer software. Although all attempts are made to edit the dictation for accuracy, there may be errors in the transcription that are not intended.   6

## 2022-08-09 NOTE — PROGRESS NOTES
Pt does open eyes to voice. Pt was tracking voice with eyes. When ask questions, pt appears to be attempting to respond by making minimal moaning/grunt noises. Unable to determine if this is purposeful.

## 2022-08-09 NOTE — PROGRESS NOTES
[facility] PROGRESS NOTE    8/9/2022 12:36 PM        Name: Katie Thrasher .               Admitted: 8/7/2022  Primary Care Provider: [facility] (Tel: None)      Subjective:    Lying bed less responsive heart rate better controlled having low-grade fevers shortness of breath improved O2 2 L after thoracentesis  Reviewed interval ancillary notes    Current Medications  metoprolol (LOPRESSOR) injection 2.5 mg, Q6H PRN  dilTIAZem injection 10 mg, Once  amiodarone (CORDARONE) tablet 400 mg, TID WC  insulin glargine (LANTUS) injection vial 29 Units, BID  insulin lispro (HUMALOG) injection vial 10 Units, TID WC  insulin lispro (HUMALOG) injection vial 0-16 Units, TID WC  albuterol sulfate HFA (PROVENTIL;VENTOLIN;PROAIR) 108 (90 Base) MCG/ACT inhaler 2 puff, Q6H PRN  aspirin chewable tablet 81 mg, Daily  azelastine (ASTELIN) 0.1 % nasal spray 1 spray, BID  mometasone-formoterol (DULERA) 200-5 MCG/ACT inhaler 2 puff, BID  carvedilol (COREG) tablet 12.5 mg, BID WC  dicyclomine (BENTYL) capsule 20 mg, Q6H  [Held by provider] losartan (COZAAR) tablet 50 mg, Daily  pantoprazole (PROTONIX) tablet 40 mg, QAM AC  tiotropium (SPIRIVA RESPIMAT) 2.5 MCG/ACT inhaler 2 puff, Daily  vitamin D (CHOLECALCIFEROL) tablet 1,000 Units, Daily  sodium chloride flush 0.9 % injection 5-40 mL, 2 times per day  sodium chloride flush 0.9 % injection 5-40 mL, PRN  0.9 % sodium chloride infusion, PRN  ondansetron (ZOFRAN-ODT) disintegrating tablet 4 mg, Q8H PRN   Or  ondansetron (ZOFRAN) injection 4 mg, Q6H PRN  polyethylene glycol (GLYCOLAX) packet 17 g, Daily PRN  acetaminophen (TYLENOL) tablet 650 mg, Q6H PRN   Or  acetaminophen (TYLENOL) suppository 650 mg, Q6H PRN  perflutren lipid microspheres (DEFINITY) injection 1.65 mg, ONCE PRN  ampicillin-sulbactam (UNASYN) 3000 mg in 100 mL NS IVPB minibag, Q6H  atorvastatin (LIPITOR) tablet 80 mg, Nightly  enoxaparin (LOVENOX) injection 40 mg, Daily  clopidogrel (PLAVIX) tablet 75 mg, Daily  dextrose bolus 10% 125 mL, PRN   Or  dextrose bolus 10% 250 mL, PRN  glucagon (rDNA) injection 1 mg, PRN  dextrose 10 % infusion, Continuous PRN  insulin lispro (HUMALOG) injection vial 0-4 Units, Nightly  nitroGLYCERIN (NITROSTAT) SL tablet 0.4 mg, Q5 Min PRN      Objective:  BP (!) 173/75   Pulse 79   Temp 100.2 °F (37.9 °C) (Axillary)   Resp 20   Ht 5' 9\" (1.753 m)   Wt 168 lb 1.6 oz (76.2 kg)   SpO2 94%   BMI 24.82 kg/m²   No intake or output data in the 24 hours ending 08/09/22 1236     Wt Readings from Last 3 Encounters:   08/09/22 168 lb 1.6 oz (76.2 kg)   06/06/22 162 lb (73.5 kg)   06/07/21 176 lb (79.8 kg)       General appearance:  lethargic today  HEENT: atraumatic, Pupils equal, muscous membranes moist, no masses appreciated  Cardiovascular: irregualry irerguloar no murmurs appreciated  Respiratory: bilateral crackles  Gastrointestinal: Abdomen soft, non-tender, BS+  EXT: no edema  Neurology: Right facial droop patient is withdrawing all extremities to painful stimulus but not following commands  Psychiatry: Appropriate affect. Not agitated  Skin: Warm, dry, no rashes appreciated    Labs and Tests:  CBC:   Recent Labs     08/07/22  1104 08/08/22  0444 08/09/22  0536   WBC 12.4* 14.6* 15.4*   HGB 14.0 14.7 14.1   PLT 96* 77* 73*       BMP:    Recent Labs     08/07/22  1104 08/08/22  0444 08/09/22  0536    136 142   K 4.7 5.0 4.1    101 108   CO2 22 16* 22   BUN 30* 28* 38*   CREATININE 1.4* 1.6* 1.5*   GLUCOSE 323* 380* 172*       Hepatic:   Recent Labs     08/07/22  1104   AST 52*   ALT 17   BILITOT 0.9   ALKPHOS 54       MRI BRAIN WO CONTRAST   Preliminary Result   Multiple areas of restricted diffusion throughout the supratentorial and   infratentorial brain as discussed above suggestive of acute areas of infarct. Metastatic disease is felt to be less likely.   Short-term follow-up is suggested. The marrow signal of the clivus and upper cervical vertebral bodies are low   on T1-weighted images. This is a nonspecific finding. This sometimes can be   normal in a young patient. Sometimes anemia or marrow infiltrative processes   can have this finding. A patient in an immunocompromised state can have this   appearance to the marrow. Clinically correlate. VL DUP CAROTID BILATERAL         XR CHEST 1 VIEW   Final Result   No pneumothorax, following left thoracentesis. Small residual left pleural effusion. Basilar atelectasis. IR GUIDED THORACENTESIS PLEURAL   Final Result   Successful ultrasound guided thoracentesis. CT CHEST PULMONARY EMBOLISM W CONTRAST   Final Result   No findings to suggest large central pulmonary embolism as discussed above. Large layering left pleural effusion. Partial atelectasis of the left lower lobe. Additional asymmetric   heterogeneous left-sided opacity can reflect atelectasis, pneumonia, or   asymmetric pulmonary edema. Indeterminate 2.5 cm lingular pulmonary nodule versus rounded atelectasis. Mediastinal and hilar adenopathy is also seen. Chest CT follow-up after   treatment and resolution of acute symptoms is recommended as malignancy   cannot be excluded. Sclerosis of the lateral right 6th rib and anterior left 3rd rib which could   be due to healing fractures or metastases. CT CERVICAL SPINE WO CONTRAST   Final Result   No acute abnormality of the cervical spine. Multilevel degenerative disc disease and facet arthropathy as outlined above. Left pleural effusion. See CT of the chest of the same date for complete   details. CT HEAD WO CONTRAST   Preliminary Result   Focal areas of abnormal low attenuation involving left frontal, left   parietal, right occipital, and left cerebellar regions. Findings are   suspicious representing foci of acute/recent ischemic change.   Multiplicity and bilaterality raise the possibility of change associated with underlying   embolic phenomenon. Follow-up MRI examination with diffusion imaging is   recommended for a more complete evaluation. Critical results were called by Dr. Addis Abarca. April Hebert MD to PALAM Tilley on 8/7/2022 at 12:18. XR ANKLE RIGHT (MIN 3 VIEWS)   Final Result   No acute osseous abnormality of the right ankle. XR CHEST PORTABLE   Final Result   Central vascular congestion with perihilar edema consistent with CHF. Left   pleural effusion with dependent opacification, possibly atelectasis or   infiltrate. Recent imaging reviewed    Problem List  Principal Problem:    Cerebrovascular accident (CVA) ([facility]  Active Problems:    Peripheral vascular disease ([address]    S/P CABG (coronary artery bypass graft)    Chronic systolic CHF (congestive heart failure) ([facility])    Pleural effusion, left    Rib lesion    Dysarthria    Dysphagia    NSTEMI (non-ST elevated myocardial infarction) ([facility])    CKD (chronic kidney disease) stage 3, GFR 30-59 ml/min ([facility])    Acute metabolic encephalopathy    Aspiration pneumonia ([facility])    Altered mental status    Elevated troponin    Meningoencephalitis    Dyslipidemia    Type 2 diabetes mellitus with complication ([facility])    Acute kidney injury superimposed on CKD ([facility])    Dehydration    Chronic obstructive pulmonary disease ([address]    Coronary artery disease due to lipid rich plaque    Ischemic cardiomyopathy    HTN (hypertension), benign  Resolved Problems:    * No resolved hospital problems.  *       Assessment & Plan:   acute cva  -  mri brain echo   - left carotid 50-70 % will need outpatient fu  - likely embolic will need  neuro input on ac    PAF with RVR  - dilt, amio  - cards on board  - ac if ok with neuro    Acute hypoxic resp failure with possible bacterial pna, and pleural effusion s/p 1.5L removed via thoracentesis  - hold ivf  - iv atbx      Elevated tropoinins: per cards not NSTEMI,  - monitor    Dm2 with hyperglycemia: off insulin pump  - improved    Dysphagia: speech on board dysphagia 3 diet    Alphonso nephro  onboard    Diet: Diet NPO  Code:Full Code        Jose Alberto Villalobos MD   8/9/2022 12:36 PM

## 2022-08-09 NOTE — CARE COORDINATION
Notified MD via perfect serve,  Patient's POA is requesting to meet with you today in his room to discuss Palliative course vs transfer to [place] for further care.

## 2022-08-09 NOTE — PROGRESS NOTES
Speech Language Pathology  Attempt    Gokul Eric  1945    Attempted to see patient for dysphagia therapy. Per RN report Pt is not appropriate for therapy due to reduced responsiveness. Will hold and follow up on 8/10/22 as medically appropriate.     Sarita Hernandez M.A., [address] [place] [address]  Speech-Language Pathologist

## 2022-08-09 NOTE — FLOWSHEET NOTE
Post procedure chart reviewed, please call IR with any questions or concerns r/t to thoracentesis performed on 8/8/22

## 2022-08-09 NOTE — ACP (ADVANCE CARE PLANNING)
Advanced Care Planning Note. Purpose of Encounter: Advanced care planning in light of hospitalization  Parties In Attendance: POA  Decisional Capacity: no  Subjective:POA   understand that this conversation is to address long term care goal  Objective: Patient lying in bed less responsive more lethargic does have CVA and EF of 30 to 35%.   Goals of Care Determination: POA patient would not want any aggressive measures at this Time including CPR or intubation we will monitor patient for improvement no improvement will consider hospice  Code Status: DNR CCA  Time spent on Advanced care Plannin minutes      Cecy Finch MD  2022 12:38 PM

## 2022-08-09 NOTE — PROGRESS NOTES
Resting quietly in bed, eyes closed, responded to verbal stimuli with barely opening eyes. Lethargic, not talking. Not able to perform NIHSS assessment, pt is unable to participate.    Assessment completed, see flow charts. kong

## 2022-08-10 ENCOUNTER — APPOINTMENT (OUTPATIENT)
Dept: CT IMAGING | Age: 77
DRG: 064 | End: 2022-08-10
Payer: OTHER GOVERNMENT

## 2022-08-10 ENCOUNTER — APPOINTMENT (OUTPATIENT)
Dept: GENERAL RADIOLOGY | Age: 77
DRG: 064 | End: 2022-08-10
Payer: OTHER GOVERNMENT

## 2022-08-10 VITALS
TEMPERATURE: 98.8 F | HEART RATE: 84 BPM | HEIGHT: 69 IN | SYSTOLIC BLOOD PRESSURE: 155 MMHG | OXYGEN SATURATION: 93 % | WEIGHT: 168.3 LBS | RESPIRATION RATE: 18 BRPM | BODY MASS INDEX: 24.93 KG/M2 | DIASTOLIC BLOOD PRESSURE: 79 MMHG

## 2022-08-10 PROBLEM — I48.0 PAROXYSMAL ATRIAL FIBRILLATION (HCC): Status: ACTIVE | Noted: 2022-08-10

## 2022-08-10 LAB
ANION GAP SERPL CALCULATED.3IONS-SCNC: 16 MMOL/L (ref 3–16)
BASOPHILS ABSOLUTE: 0.2 K/UL (ref 0–0.2)
BASOPHILS RELATIVE PERCENT: 1.1 %
BUN BLDV-MCNC: 42 MG/DL (ref 7–20)
CALCIUM SERPL-MCNC: 8.5 MG/DL (ref 8.3–10.6)
CHLORIDE BLD-SCNC: 111 MMOL/L (ref 99–110)
CO2: 18 MMOL/L (ref 21–32)
CREAT SERPL-MCNC: 1.7 MG/DL (ref 0.8–1.3)
EOSINOPHILS ABSOLUTE: 0 K/UL (ref 0–0.6)
EOSINOPHILS RELATIVE PERCENT: 0.1 %
GFR AFRICAN AMERICAN: 48
GFR NON-AFRICAN AMERICAN: 39
GLUCOSE BLD-MCNC: 175 MG/DL (ref 70–99)
GLUCOSE BLD-MCNC: 205 MG/DL (ref 70–99)
GLUCOSE BLD-MCNC: 222 MG/DL (ref 70–99)
GLUCOSE BLD-MCNC: 277 MG/DL (ref 70–99)
HCT VFR BLD CALC: 43.3 % (ref 40.5–52.5)
HEMOGLOBIN: 14.6 G/DL (ref 13.5–17.5)
LYMPHOCYTES ABSOLUTE: 1.7 K/UL (ref 1–5.1)
LYMPHOCYTES RELATIVE PERCENT: 10.4 %
MCH RBC QN AUTO: 34.2 PG (ref 26–34)
MCHC RBC AUTO-ENTMCNC: 33.7 G/DL (ref 31–36)
MCV RBC AUTO: 101.5 FL (ref 80–100)
MONOCYTES ABSOLUTE: 1.3 K/UL (ref 0–1.3)
MONOCYTES RELATIVE PERCENT: 7.7 %
NEUTROPHILS ABSOLUTE: 13.4 K/UL (ref 1.7–7.7)
NEUTROPHILS RELATIVE PERCENT: 80.7 %
PDW BLD-RTO: 13.5 % (ref 12.4–15.4)
PERFORMED ON: ABNORMAL
PLATELET # BLD: 52 K/UL (ref 135–450)
PMV BLD AUTO: 9.4 FL (ref 5–10.5)
POTASSIUM REFLEX MAGNESIUM: 3.9 MMOL/L (ref 3.5–5.1)
PROCALCITONIN: 0.34 NG/ML (ref 0–0.15)
RBC # BLD: 4.27 M/UL (ref 4.2–5.9)
SODIUM BLD-SCNC: 145 MMOL/L (ref 136–145)
WBC # BLD: 16.7 K/UL (ref 4–11)

## 2022-08-10 PROCEDURE — 6360000002 HC RX W HCPCS: Performed by: INTERNAL MEDICINE

## 2022-08-10 PROCEDURE — 71045 X-RAY EXAM CHEST 1 VIEW: CPT

## 2022-08-10 PROCEDURE — 36415 COLL VENOUS BLD VENIPUNCTURE: CPT

## 2022-08-10 PROCEDURE — 94760 N-INVAS EAR/PLS OXIMETRY 1: CPT

## 2022-08-10 PROCEDURE — 99233 SBSQ HOSP IP/OBS HIGH 50: CPT | Performed by: INTERNAL MEDICINE

## 2022-08-10 PROCEDURE — 94640 AIRWAY INHALATION TREATMENT: CPT

## 2022-08-10 PROCEDURE — 85025 COMPLETE CBC W/AUTO DIFF WBC: CPT

## 2022-08-10 PROCEDURE — 6370000000 HC RX 637 (ALT 250 FOR IP): Performed by: INTERNAL MEDICINE

## 2022-08-10 PROCEDURE — 2700000000 HC OXYGEN THERAPY PER DAY

## 2022-08-10 PROCEDURE — 99232 SBSQ HOSP IP/OBS MODERATE 35: CPT | Performed by: HOSPITALIST

## 2022-08-10 PROCEDURE — 84145 PROCALCITONIN (PCT): CPT

## 2022-08-10 PROCEDURE — 93005 ELECTROCARDIOGRAM TRACING: CPT | Performed by: INTERNAL MEDICINE

## 2022-08-10 PROCEDURE — 2580000003 HC RX 258: Performed by: INTERNAL MEDICINE

## 2022-08-10 PROCEDURE — 80048 BASIC METABOLIC PNL TOTAL CA: CPT

## 2022-08-10 PROCEDURE — 70450 CT HEAD/BRAIN W/O DYE: CPT

## 2022-08-10 PROCEDURE — 99233 SBSQ HOSP IP/OBS HIGH 50: CPT | Performed by: PSYCHIATRY & NEUROLOGY

## 2022-08-10 RX ORDER — AMIODARONE HYDROCHLORIDE 200 MG/1
200 TABLET ORAL DAILY
Status: DISCONTINUED | OUTPATIENT
Start: 2022-08-11 | End: 2022-08-10 | Stop reason: HOSPADM

## 2022-08-10 RX ADMIN — ACETAMINOPHEN 650 MG: 650 SUPPOSITORY RECTAL at 01:41

## 2022-08-10 RX ADMIN — Medication 2 PUFF: at 08:06

## 2022-08-10 RX ADMIN — Medication 10 ML: at 09:44

## 2022-08-10 RX ADMIN — INSULIN GLARGINE 29 UNITS: 100 INJECTION, SOLUTION SUBCUTANEOUS at 09:41

## 2022-08-10 RX ADMIN — SODIUM CHLORIDE 25 ML: 9 INJECTION, SOLUTION INTRAVENOUS at 02:28

## 2022-08-10 RX ADMIN — SODIUM CHLORIDE 3000 MG: 900 INJECTION INTRAVENOUS at 02:32

## 2022-08-10 RX ADMIN — TIOTROPIUM BROMIDE INHALATION SPRAY 2 PUFF: 3.12 SPRAY, METERED RESPIRATORY (INHALATION) at 08:07

## 2022-08-10 ASSESSMENT — PAIN SCALES - WONG BAKER
WONGBAKER_NUMERICALRESPONSE: 0

## 2022-08-10 ASSESSMENT — PAIN SCALES - GENERAL: PAINLEVEL_OUTOF10: 0

## 2022-08-10 NOTE — DISCHARGE INSTRUCTIONS
Your information:  Name: Vaibhav Amos  : 1945    Your Discharge Instructions    What to do after you leave the hospital:    Read, review and familiarize yourself with the information provided below and in a separate packet on        Diet:    Recommended activity:      Avoid strenuous activity until instructed by your physician to resume; balance rest with periods of light to normal activity. If you experience any of the following: Unusual or inadequately controlled pain; unusual transient shortness of breath; recurrent or persistent nausea, heartburn, palpitations or lightheadedness; increased swelling; increased fatigue; fever >100; please follow up with @[email or web address]@ [unfilled] or go to the Emergency Room. Home Health/ Outpatient Services:        Information obtained by:  By signing below, I understand and acknowledge receipt of the instructions indicated above, and I understand that if any problems occur once I leave the hospital I am to contact [email or web address].

## 2022-08-10 NOTE — ACP (ADVANCE CARE PLANNING)
Advanced Care Planning Note. Purpose of Encounter: Advanced care planning in light of hospitalization  Parties In Attendance: Ronel Newman  Decisional Capacity: NO  Subjective:POA understand that this conversation is to address long term care goal  Objective:  To worsen mental status becoming septic along with CT now with hemorrhage  Goals of Care Determination: Poor prognosis and patient's wish and his POA decided to pursue hospice as this is what the patient would have wanted    Kendra Altamirano MD  8/10/2022 1:16 PM

## 2022-08-10 NOTE — PROGRESS NOTES
Speech Language Pathology  Attempt    Myrna Cheng  1945    SLP attempted to see pt this date for dysphagia intervention. Chart reviewed, spoke with RN. Pt is not appropriate for therapy due to reduced responsiveness. Second attempt, will discharge from speech therapy caseload. Please re-consult as medically appropriate.     Emilia Peraza, [address] [facility]  Speech-Language Pathologist  [phone]

## 2022-08-10 NOTE — PLAN OF CARE
Muscle spasms in right shoulder and left leg. Notified NP. Orphenadrine ordered. Administered per MAR. Problem: Discharge Planning  Goal: Discharge to home or other facility with appropriate resources  8/10/2022 0056 by Jeremías Beckwith RN  Outcome: Progressing  Flowsheets (Taken 8/9/2022 2159)  Discharge to home or other facility with appropriate resources: Refer to discharge planning if patient needs post-hospital services based on physician order or complex needs related to functional status, cognitive ability or social support system  8/9/2022 1702 by Isi Lockhart RN  Outcome: Not Progressing  Flowsheets  Taken 8/9/2022 0945 by Isi Lockhart RN  Discharge to home or other facility with appropriate resources: Refer to discharge planning if patient needs post-hospital services based on physician order or complex needs related to functional status, cognitive ability or social support system  Taken 8/9/2022 0941 by Enedina Blackwell RN  Discharge to home or other facility with appropriate resources: Identify barriers to discharge with patient and caregiver     Problem: Pain  Goal: Verbalizes/displays adequate comfort level or baseline comfort level  8/10/2022 0056 by Jeremías Beckwith RN  Outcome: Progressing  8/9/2022 1702 by Isi Lockhart RN  Outcome: Not Progressing     Problem: Safety - Adult  Goal: Free from fall injury  8/10/2022 0056 by Jeremías Beckwith RN  Outcome: Progressing  8/9/2022 1702 by Isi Lockhart RN  Outcome: Not Progressing     Problem: Skin/Tissue Integrity  Goal: Absence of new skin breakdown  Description: 1. Monitor for areas of redness and/or skin breakdown  2. Assess vascular access sites hourly  3. Every 4-6 hours minimum:  Change oxygen saturation probe site  4. Every 4-6 hours:  If on nasal continuous positive airway pressure, respiratory therapy assess nares and determine need for appliance change or resting period.   8/10/2022 0056 by Jeremías Beckwith RN  Outcome: Progressing  8/9/2022 1702 by Dixie Horan RN  Outcome: Not Progressing     Problem: Chronic Conditions and Co-morbidities  Goal: Patient's chronic conditions and co-morbidity symptoms are monitored and maintained or improved  8/10/2022 0056 by Clay Taylor RN  Outcome: Progressing  Flowsheets (Taken 8/9/2022 2159)  Care Plan - Patient's Chronic Conditions and Co-Morbidity Symptoms are Monitored and Maintained or Improved: Monitor and assess patient's chronic conditions and comorbid symptoms for stability, deterioration, or improvement  8/9/2022 1702 by Dixie Horan RN  Outcome: Not Progressing  Flowsheets  Taken 8/9/2022 0945 by Dixie Horan RN  Care Plan - Patient's Chronic Conditions and Co-Morbidity Symptoms are Monitored and Maintained or Improved: Monitor and assess patient's chronic conditions and comorbid symptoms for stability, deterioration, or improvement  Taken 8/9/2022 0941 by Queta Kimbrough RN  Care Plan - Patient's Chronic Conditions and Co-Morbidity Symptoms are Monitored and Maintained or Improved: Monitor and assess patient's chronic conditions and comorbid symptoms for stability, deterioration, or improvement     Problem: ABCDS Injury Assessment  Goal: Absence of physical injury  8/10/2022 0056 by Clay Taylor RN  Outcome: Progressing  8/9/2022 1702 by Dixie Horan RN  Outcome: Not Progressing     Problem: Discharge Planning  Goal: Discharge to home or other facility with appropriate resources  8/10/2022 0056 by Clay Taylor RN  Outcome: Progressing  Flowsheets (Taken 8/9/2022 2159)  Discharge to home or other facility with appropriate resources: Refer to discharge planning if patient needs post-hospital services based on physician order or complex needs related to functional status, cognitive ability or social support system  8/9/2022 1702 by Dixie Horan RN  Outcome: Not Progressing  Flowsheets  Taken 8/9/2022 0945 by Dixie Horan RN  Discharge to home or other facility with appropriate resources: Refer to discharge planning if patient needs post-hospital services based on physician order or complex needs related to functional status, cognitive ability or social support system  Taken 8/9/2022 0941 by Yosi Salcido RN  Discharge to home or other facility with appropriate resources: Identify barriers to discharge with patient and caregiver     Problem: Pain  Goal: Verbalizes/displays adequate comfort level or baseline comfort level  8/10/2022 0056 by Catha Gilford, RN  Outcome: Progressing  8/9/2022 1702 by Janes Gamino RN  Outcome: Not Progressing     Problem: Safety - Adult  Goal: Free from fall injury  8/10/2022 0056 by Catha Gilford, RN  Outcome: Progressing  8/9/2022 1702 by Janes Gamino RN  Outcome: Not Progressing     Problem: Skin/Tissue Integrity  Goal: Absence of new skin breakdown  Description: 1. Monitor for areas of redness and/or skin breakdown  2. Assess vascular access sites hourly  3. Every 4-6 hours minimum:  Change oxygen saturation probe site  4. Every 4-6 hours:  If on nasal continuous positive airway pressure, respiratory therapy assess nares and determine need for appliance change or resting period.   8/10/2022 0056 by Catha Gilford, RN  Outcome: Progressing  8/9/2022 1702 by Janes Gamino RN  Outcome: Not Progressing     Problem: Chronic Conditions and Co-morbidities  Goal: Patient's chronic conditions and co-morbidity symptoms are monitored and maintained or improved  8/10/2022 0056 by Catha Gilford, RN  Outcome: Progressing  Flowsheets (Taken 8/9/2022 2159)  Care Plan - Patient's Chronic Conditions and Co-Morbidity Symptoms are Monitored and Maintained or Improved: Monitor and assess patient's chronic conditions and comorbid symptoms for stability, deterioration, or improvement  8/9/2022 1702 by Janes Gamino RN  Outcome: Not Progressing  Flowsheets  Taken 8/9/2022 0945 by Samian Grimes 34 - Patient's Chronic Conditions and Co-Morbidity Symptoms are Monitored and Maintained or Improved: Monitor and assess patient's chronic conditions and comorbid symptoms for stability, deterioration, or improvement  Taken 8/9/2022 0941 by Bruno Judd RN  Care Plan - Patient's Chronic Conditions and Co-Morbidity Symptoms are Monitored and Maintained or Improved: Monitor and assess patient's chronic conditions and comorbid symptoms for stability, deterioration, or improvement     Problem: ABCDS Injury Assessment  Goal: Absence of physical injury  8/10/2022 0056 by Beata Boles RN  Outcome: Progressing  8/9/2022 1702 by Jeffrey Mistry RN  Outcome: Not Progressing

## 2022-08-10 NOTE — PROGRESS NOTES
Clinical Pharmacy Note: Pharmacy to Dose Vancomycin    Darliss Schlatter is a 78 y.o. male started on Vancomycin for CAP; consult received from Dr. Rupesh Gomez to manage therapy. Also receiving the following antibiotics: Zosyn 3.375 G Q8H. Goal Trough Level: 15-20 mcg/mL    Assessment/Plan:  Initiate vancomycin 15 mg IV every 24 hours. A vancomycin trough has been ordered for 8/13 at 1330  Changes in regimen will be determined based on culture results, renal function, and clinical response. Pharmacy will continue to monitor and adjust regimen as necessary. Allergies:  Gabapentin, Morphine, Morphine and related, and Bupropion     Recent Labs     08/09/22  0536 08/10/22  0534   CREATININE 1.5* 1.7*       Recent Labs     08/09/22  0536 08/10/22  0534   WBC 15.4* 16.7*       Ht Readings from Last 1 Encounters:   08/07/22 5' 9\" (1.753 m)        Wt Readings from Last 1 Encounters:   08/10/22 168 lb 4.8 oz (76.3 kg)         Estimated Creatinine Clearance: 38 mL/min (A) (based on SCr of 1.7 mg/dL (H)).       Thank you for the consult,    Kamille Donovan

## 2022-08-10 NOTE — PROGRESS NOTES
Brianna Acevedo  Neurology Follow-up  [facility]    Date of Service: 8/10/2022    Subjective:   CC: Follow up today regarding: Acute stroke    Events noted. Chart and lab reviewed. The patient is about the same. Opens eyes to voice and goes back to sleep. Nonverbal.  Discussed with PA. Plan for hospice. No other new symptoms today. Other review of system was unremarkable. ROS : A 10-12 system review obtained from discussion with patient's family/and or nurse, was unremarkable. family history is not on file. He was adopted.     Past Medical History:   Diagnosis Date    Acute CVA (cerebrovascular accident) ([address]     Arthritis     CAD (coronary artery disease)     Cancer ([address]     Chest pain     COPD (chronic obstructive pulmonary disease) (HCC)     Diabetes mellitus (HCC)     pump patient    Drug use     Hx of crystal meth, cocaine per patient     Emphysema lung ([address]     Fibromyalgia     takes Lyrica for fibromyalgia and diabetic nerve pain    GERD (gastroesophageal reflux disease)     Heart attack ([address] 1991    1st heart attack (MI) 1991, 2nd heart attack (MI) pt does not recall    Hepatitis     Hep A    History of open heart surgery     Triple Vessel Disease at [address] [facility])    Hyperlipidemia     Hypertension     Peripheral vascular disease ([address]     Precancerous skin lesion     not active; pt sees skin specialist    Sleep apnea      Current Facility-Administered Medications   Medication Dose Route Frequency Provider Last Rate Last Admin    vancomycin (VANCOCIN) 1,250 mg in dextrose 5 % 250 mL IVPB  15 mg/kg IntraVENous Q12H Marquez Starr MD        piperacillin-tazobactam (ZOSYN) 3,375 mg in dextrose 5 % 50 mL IVPB extended infusion (mini-bag)  3,375 mg IntraVENous Q8H Marquez Starr MD        piperacillin-tazobactam (ZOSYN) 4,500 mg in dextrose 5 % 100 mL IVPB (mini-bag)  4,500 mg IntraVENous Once aMrquez Starr MD        [START ON 8/11/2022] amiodarone (CORDARONE) tablet 200 mg  200 mg Oral Daily Sherley Diego MD        metoprolol (LOPRESSOR) injection 2.5 mg  2.5 mg IntraVENous Q6H PRN Marilou Peres MD        dilTIAZem injection 10 mg  10 mg IntraVENous Once IAN Brooks - MINDA        insulin glargine (LANTUS) injection vial 29 Units  29 Units SubCUTAneous BID Lorena Burrows MD   29 Units at 08/10/22 0941    insulin lispro (HUMALOG) injection vial 10 Units  10 Units SubCUTAneous TID  Lorena Burrows MD   10 Units at 08/08/22 1725    insulin lispro (HUMALOG) injection vial 0-16 Units  0-16 Units SubCUTAneous TID  Lorena Burrows MD        albuterol sulfate HFA (PROVENTIL;VENTOLIN;PROAIR) 108 (90 Base) MCG/ACT inhaler 2 puff  2 puff Inhalation Q6H PRN Darline Ortiz MD        aspirin chewable tablet 81 mg  81 mg Oral Daily Darline Ortiz MD        azelastine (ASTELIN) 0.1 % nasal spray 1 spray  1 spray Each Nostril BID Darline Ortiz MD   1 spray at 08/09/22 2149    mometasone-formoterol (DULERA) 200-5 MCG/ACT inhaler 2 puff  2 puff Inhalation BID Darline Ortiz MD   2 puff at 08/10/22 0806    carvedilol (COREG) tablet 12.5 mg  12.5 mg Oral BID  Darline Ortiz MD   12.5 mg at 08/08/22 1026    dicyclomine (BENTYL) capsule 20 mg  20 mg Oral Q6H Darline Ortiz MD   20 mg at 08/08/22 0958    [Held by provider] losartan (COZAAR) tablet 50 mg  50 mg Oral Daily Darline Ortiz MD   50 mg at 08/08/22 0958    pantoprazole (PROTONIX) tablet 40 mg  40 mg Oral QAM AC Darline Ortiz MD   40 mg at 08/08/22 0639    tiotropium (SPIRIVA RESPIMAT) 2.5 MCG/ACT inhaler 2 puff  2 puff Inhalation Daily Darline Ortiz MD   2 puff at 08/10/22 0807    vitamin D (CHOLECALCIFEROL) tablet 1,000 Units  1,000 Units Oral Daily Darline Ortiz MD   1,000 Units at 08/08/22 0958    sodium chloride flush 0.9 % injection 5-40 mL  5-40 mL IntraVENous 2 times per day Darline Ortiz MD   10 mL at 08/10/22 0944    sodium chloride flush 0.9 % injection 5-40 mL  5-40 mL IntraVENous PRN Darline Ortiz MD   10 mL at 08/08/22 0634    0.9 % sodium chloride infusion   IntraVENous PRN Kumar Israel  mL/hr at 08/10/22 0228 25 mL at 08/10/22 0228    ondansetron (ZOFRAN-ODT) disintegrating tablet 4 mg  4 mg Oral Q8H PRN Kumar Israel MD        Or    ondansetron (ZOFRAN) injection 4 mg  4 mg IntraVENous Q6H PRN Kumar Israel MD        polyethylene glycol (GLYCOLAX) packet 17 g  17 g Oral Daily PRN Kumar Israel MD        acetaminophen (TYLENOL) tablet 650 mg  650 mg Oral Q6H PRN Kumar Israel MD        Or    acetaminophen (TYLENOL) suppository 650 mg  650 mg Rectal Q6H PRN Kumar Israel MD   650 mg at 08/10/22 0141    perflutren lipid microspheres (DEFINITY) injection 1.65 mg  1.5 mL IntraVENous ONCE PRN Kumar Israel MD        atorvastatin (LIPITOR) tablet 80 mg  80 mg Oral Nightly Kumar Israel MD   80 mg at 08/07/22 2130    enoxaparin (LOVENOX) injection 40 mg  40 mg SubCUTAneous Daily Kumar Israel MD   40 mg at 08/07/22 2154    clopidogrel (PLAVIX) tablet 75 mg  75 mg Oral Daily Kumar Israel MD   75 mg at 08/07/22 2154    dextrose bolus 10% 125 mL  125 mL IntraVENous PRN Kumar Israel MD        Or    dextrose bolus 10% 250 mL  250 mL IntraVENous PRN Kumar Israel MD        glucagon (rDNA) injection 1 mg  1 mg SubCUTAneous PRN Kumar Israel MD        dextrose 10 % infusion   IntraVENous Continuous PRN Kumar Israel MD        insulin lispro (HUMALOG) injection vial 0-4 Units  0-4 Units SubCUTAneous Nightly Kumar Israel MD   4 Units at 08/08/22 2115    nitroGLYCERIN (NITROSTAT) SL tablet 0.4 mg  0.4 mg SubLINGual Q5 Min PRN Kumar Israel MD         Allergies   Allergen Reactions    Gabapentin Other (See Comments)     dizzy    Morphine     Morphine And Related Other (See Comments)     Other reaction(s): Confusion  Nightmares     Bupropion Anxiety      reports that he quit smoking about 15 years ago. His smoking use included cigarettes. He has never used smokeless tobacco. He reports that he does not currently use drugs. He reports that he does not drink alcohol. Objective:  Constitutional:   Vitals:    08/10/22 0619 08/10/22 0715 08/10/22 0807 08/10/22 1110   BP:  (!) 174/87  (!) 155/79   Pulse:  86 86 84   Resp:  18 20 18   Temp:  98.2 °F (36.8 °C)  98.8 °F (37.1 °C)   TempSrc:  Axillary  Axillary   SpO2: 91% 95% 96% 93%   Weight:       Height:           General appearance: No changes. e opens eyes to voice and goes back to sleep  Mental Status:   Patient is drowsy  Opens eyes to voice and goes back to sleep  Poor attention and nonverbal  Does not follow direction  Cranial Nerves:   II: Pupils: equal, round, reactive to light  III,IV,VI: No gaze preference. No nystagmus  V: Facial sensation: Grossly unremarkable. VII: Facial strength and movements: intact and symmetric  XII: Tongue movements : midline  Musculoskeletal: He can move his arms or legs in response to pain  Normal tone  Upgoing toes bilaterally  No abnormal movement        Data:  LABS:   Lab Results   Component Value Date/Time     08/10/2022 05:34 AM    K 3.9 08/10/2022 05:34 AM     08/10/2022 05:34 AM    CO2 18 08/10/2022 05:34 AM    BUN 42 08/10/2022 05:34 AM    CREATININE 1.7 08/10/2022 05:34 AM    GFRAA 48 08/10/2022 05:34 AM    LABGLOM 39 08/10/2022 05:34 AM    GLUCOSE 205 08/10/2022 05:34 AM    PHOS 3.6 05/05/2019 05:42 AM    MG 2.10 05/05/2019 05:42 AM    CALCIUM 8.5 08/10/2022 05:34 AM     Lab Results   Component Value Date/Time    WBC 16.7 08/10/2022 05:34 AM    RBC 4.27 08/10/2022 05:34 AM    HGB 14.6 08/10/2022 05:34 AM    HCT 43.3 08/10/2022 05:34 AM    .5 08/10/2022 05:34 AM    RDW 13.5 08/10/2022 05:34 AM    PLT 52 08/10/2022 05:34 AM     Lab Results   Component Value Date    INR 1.44 (H) 08/07/2022    PROTIME 17.5 (H) 08/07/2022       Neuroimaging and labs were independently reviewed by me and discussed with his POA  Reviewed notes from different physicians. Impression: No change, persistent encephalopathy.   Acute encephalopathy secondary to new ischemic strokes affecting bilateral hemisphere more right than left with hemorrhagic component in his right PCA stroke. Not improving. Severe. Hypertension, not controlled  Diabetes  Hyperlipidemia  Elevated troponin      Recommendation    MRI result discussed with his POA  Agree with hospice  Continue current supportive care  Aspiration precautions  Insulin sliding scale  Blood sugar control  Continue current blood pressure medications  AP therapy  Poor prognosis  No further recommendation           Jonnie Corado MD   [phone]      This dictation was generated by voice recognition computer software. Although all attempts are made to edit the dictation for accuracy, there may be errors in the transcription that are not intended.   6

## 2022-08-10 NOTE — PROGRESS NOTES
PULMONARY AND CRITICAL CARE MEDICINE PROGRESS NOTE    Subjective: Patient remains mostly unresponsive. Overnight continued to have high-grade fevers. Oxygen requirement has increased to 5 L/min. Does open his eyes to noxious stimuli. MRI brain showing acute CVA. Also remains in paroxysmal A. fib. REVIEW OF SYSTEMS:   14 point ROS could not be obtained due to patient factors. Patient unresponsive.     PAST MEDICAL HISTORY:   Past Medical History:   Diagnosis Date    Acute CVA (cerebrovascular accident) ([address]     Arthritis     CAD (coronary artery disease)     Cancer ([address]     Chest pain     COPD (chronic obstructive pulmonary disease) (HCC)     Diabetes mellitus (HCC)     pump patient    Drug use     Hx of crystal meth, cocaine per patient     Emphysema lung ([address]     Fibromyalgia     takes Lyrica for fibromyalgia and diabetic nerve pain    GERD (gastroesophageal reflux disease)     Heart attack ([address] 1991    1st heart attack (MI) 1991, 2nd heart attack (MI) pt does not recall    Hepatitis     Hep A    History of open heart surgery     Triple Vessel Disease at [place] ([facility])    Hyperlipidemia     Hypertension     Peripheral vascular disease ([address]     Precancerous skin lesion     not active; pt sees skin specialist    Sleep apnea        PAST SURGICAL HISTORY:   Past Surgical History:   Procedure Laterality Date    ANKLE CLOSED REDUCTION      CARDIAC SURGERY      single vessel CABG in 1999    EYE SURGERY      cataracts       SOCIAL HISTORY:   Social History     Tobacco Use    Smoking status: Former     Types: Cigarettes     Quit date: 1/8/2007     Years since quitting: 15.5    Smokeless tobacco: Never    Tobacco comments:     3.5 PPD 14 years ago   Vaping Use    Vaping Use: Never used   Substance Use Topics    Alcohol use: No    Drug use: Not Currently     Comment: 32 years ago, eliane crystal meth       FAMILY HISTORY:   Family History   Adopted: Yes       MEDICATIONS:     No current facility-administered medications on file prior to encounter. Current Outpatient Medications on File Prior to Encounter   Medication Sig Dispense Refill    spironolactone (ALDACTONE) 25 MG tablet Take 25 mg by mouth in the morning. alendronate (FOSAMAX) 70 MG tablet Take 70 mg by mouth every 7 days      furosemide (LASIX) 20 MG tablet Take 20 mg by mouth in the morning and 20 mg before bedtime. furosemide (LASIX) 20 MG tablet Take 20 mg by mouth 2 times daily      albuterol sulfate  (90 Base) MCG/ACT inhaler Inhale 2 puffs into the lungs every 6 hours as needed for Wheezing 1 Inhaler 3    losartan (COZAAR) 50 MG tablet Take 1 tablet by mouth daily 30 tablet 3    furosemide (LASIX) 20 MG tablet Take 1 tablet by mouth daily for 10 days 10 tablet 0    Umeclidinium Bromide 62.5 MCG/INH AEPB Inhale 62.5 mcg into the lungs      budesonide-formoterol (SYMBICORT) 160-4.5 MCG/ACT AERO Inhale 2 puffs into the lungs 2 times daily 1 Inhaler 3    atorvastatin (LIPITOR) 40 MG tablet Take 20 mg by mouth nightly      Azelastine HCl 137 MCG/SPRAY SOLN 1 spray by Nasal route 2 times daily Indications: 1 spray each nare      carvedilol (COREG) 12.5 MG tablet Take 1 tablet by mouth 2 times daily (with meals) (Patient taking differently: Take 25 mg by mouth in the morning and 25 mg in the evening.  Take with meals.) 60 tablet 3    alprostadil (EDEX) 40 MCG injection 40 mcg by Intracavitary route as needed for Erectile Dysfunction use no more than 3 times per week      Insulin Pump Accessories MISC by Does not apply route Basal  3843-9901= 1.9 units/hr  9106-3244=2.05 units/hr  0868-9764=1.4 units/hr  4702-5583=1.8 units/hr     Carb ratio 5.5  Sens 22:1  Goal 8815-9290= 100-120           3620-9892= 120-140      omeprazole (PRILOSEC) 20 MG delayed release capsule Take 20 mg by mouth daily      aspirin 81 MG tablet Take 81 mg by mouth daily      vitamin D (CHOLECALCIFEROL) 1000 UNIT TABS tablet Take 1,000 Units by mouth daily      dicyclomine (BENTYL) 20 MG tablet Take 20 mg by mouth every 6 hours          vancomycin  15 mg/kg IntraVENous Q12H    piperacillin-tazobactam  3,375 mg IntraVENous Q8H    piperacillin-tazobactam  4,500 mg IntraVENous Once    dilTIAZem  10 mg IntraVENous Once    amiodarone  400 mg Oral TID WC    insulin glargine  29 Units SubCUTAneous BID    insulin lispro  10 Units SubCUTAneous TID WC    insulin lispro  0-16 Units SubCUTAneous TID WC    aspirin  81 mg Oral Daily    azelastine  1 spray Each Nostril BID    mometasone-formoterol  2 puff Inhalation BID    carvedilol  12.5 mg Oral BID WC    dicyclomine  20 mg Oral Q6H    [Held by provider] losartan  50 mg Oral Daily    pantoprazole  40 mg Oral QAM AC    tiotropium  2 puff Inhalation Daily    Vitamin D  1,000 Units Oral Daily    sodium chloride flush  5-40 mL IntraVENous 2 times per day    atorvastatin  80 mg Oral Nightly    enoxaparin  40 mg SubCUTAneous Daily    clopidogrel  75 mg Oral Daily    insulin lispro  0-4 Units SubCUTAneous Nightly      sodium chloride 25 mL (08/10/22 0228)    dextrose       metoprolol, albuterol sulfate HFA, sodium chloride flush, sodium chloride, ondansetron **OR** ondansetron, polyethylene glycol, acetaminophen **OR** acetaminophen, perflutren lipid microspheres, dextrose bolus **OR** dextrose bolus, glucagon (rDNA), dextrose, nitroGLYCERIN      ALLERGIES:   Allergies as of 08/07/2022 - Fully Reviewed 08/07/2022   Allergen Reaction Noted    Gabapentin Other (See Comments) 03/08/2019    Morphine  03/08/2019    Morphine and related Other (See Comments) 03/09/2015    Bupropion Anxiety 03/08/2019      OBJECTIVE:   height is 5' 9\" (1.753 m) and weight is 168 lb 4.8 oz (76.3 kg). His axillary temperature is 98.2 °F (36.8 °C). His blood pressure is 174/87 (abnormal) and his pulse is 86. His respiration is 20 and oxygen saturation is 96%. No intake/output data recorded.      PHYSICAL EXAM:    CONSTITUTIONAL: He is a 78y.o.-year-old who appears his stated age. He is unresponsive to verbal or tactile stimuli. With noxious stimuli he does open his eyes but does not follow commands. HEENT: PERRLA, EOMI. No scleral icterus. No thrush, atraumatic, normocephalic. NECK: Supple, without cervical or supraclavicular lymphadenopathy:  CARDIOVASCULAR: S1 S2 RRR. Without murmer  RESPIRATORY & CHEST: Decreased breath sounds in the left side of the chest.  No wheezing or crackles heard. GASTROINTESTINAL & ABDOMEN: Soft, nontender, positive bowel sounds in all quadrants, non-distended, without hepatosplenomegaly. GENITOURINARY: Deferred. MUSCULOSKELETAL: No tenderness to palpation of the axial skeleton. There is no clubbing. No cyanosis. No edema of the lower extremities. SKIN OF BODY: No rash or jaundice. PSYCHIATRIC EVALUATION: Normal affect. Patient answers questions appropriately. HEMATOLOGIC/LYMPHATIC/ IMMUNOLOGIC: No palpable lymphadenopathy. NEUROLOGIC: Unresponsive to verbal or tactile stimuli. Barely opens his eyes to noxious stimuli. LABS:  Recent Labs     08/07/22  1103 08/07/22  1104 08/07/22  1104 08/08/22  0444 08/09/22  0536 08/10/22  0534   WBC  --  12.4*   < > 14.6* 15.4* 16.7*   HGB  --  14.0   < > 14.7 14.1 14.6   HCT  --  42.0   < > 43.8 41.8 43.3   PLT  --  96*   < > 77* 73* 52*   CHOL  --   --   --  125  --   --    TRIG  --   --   --  149  --   --    HDL  --   --   --  27*  --   --    ALT  --  17  --   --   --   --    AST  --  52*  --   --   --   --    NA  --  136   < > 136 142 145   K  --  4.7  --  5.0 4.1 3.9   CL  --  104   < > 101 108 111*   CREATININE  --  1.4*   < > 1.6* 1.5* 1.7*   BUN  --  30*   < > 28* 38* 42*   CO2  --  22   < > 16* 22 18*   INR 1.44*  --   --   --   --   --     < > = values in this interval not displayed.        Recent Labs     08/08/22  0444 08/09/22  0536 08/10/22  0534   GLUCOSE 380* 172* 205*   CALCIUM 8.3 8.9 8.5    142 145   K 5.0 4.1 3.9   CO2 16* 22 18*    108 111*   BUN 28* 38* 42*   CREATININE 1.6* 1.5* 1.7*       Recent Labs     08/09/22  0910   PHART 7.470*   VPQ1RXS 29.7*   PO2ART 62.8*   GRA7EYN 21.6   T0AULLEI 93.7   BEART -1.0       Lab Results   Component Value Date    INR 1.44 (H) 08/07/2022    INR 1.13 05/01/2019    INR 1.18 (H) 04/20/2019    PROTIME 17.5 (H) 08/07/2022    PROTIME 12.9 05/01/2019    PROTIME 13.4 (H) 04/20/2019     No results found for: AMYLASE   Lab Results   Component Value Date    LABA1C 7.2 08/08/2022     Lab Results   Component Value Date    .9 08/08/2022     No results found for: TSH, B5EUYHK, R4TNYCU, THYROIDAB, FT3, T4FREE  Lab Results   Component Value Date    CKTOTAL 716 (H) 08/07/2022    TROPONINI 0.60 ([place]) 08/08/2022      No results found for: CRP   No results found for: BNP   No results found for: DDIMER   Lab Results   Component Value Date    FERRITIN 256.6 03/12/2019      Lab Results   Component Value Date    LACTA 1.4 07/15/2020           IMAGING:    Narrative   EXAMINATION:   CTA OF THE CHEST 8/7/2022 11:51 am       TECHNIQUE:   CTA of the chest was performed after the administration of intravenous   contrast.  Multiplanar reformatted images are provided for review. MIP   images are provided for review. Automated exposure control, iterative   reconstruction, and/or weight based adjustment of the mA/kV was utilized to   reduce the radiation dose to as low as reasonably achievable.        COMPARISON:   CT chest dated 05/01/2019       HISTORY:   ORDERING SYSTEM PROVIDED HISTORY: elevated trop - ro PE   TECHNOLOGIST PROVIDED HISTORY:   Reason for exam:->elevated trop - ro PE   Decision Support Exception - unselect if not a suspected or confirmed   emergency medical condition->Emergency Medical Condition (MA)   Reason for Exam: Altered Mental Status (Pt with AMS x few days per family, in   by EMS, pt alert to self and place, disorientated to situation or time, pt   inuslin pump per EMS pt has been having trouble, bs 200s)       FINDINGS: Pulmonary Arteries: Within the main, central most right, central most left   pulmonary arteries, no evidence of filling defect to suggest large central   pulmonary embolism. Evaluation beyond the level of the suyapa is nondiagnostic   secondary to marked artifact. Mediastinum: Status post median sternotomy. Calcification of the thoracic   aorta. No aortic intraluminal flap. Coronary artery calcification. Mediastinal and bilateral hilar adenopathy is demonstrated. Pretracheal   lymph node for example measures approximately 1.4 cm in short axis. Precarinal lymph node is 1.4 cm. Right hilar lymph node for example measures   3.1 x 2.3 cm. Thyroid is symmetric. No axillary adenopathy. Lungs/pleura: Large layering left pleural effusion is seen. Emphysema is   present. Respiratory motion artifact noted. Partial atelectasis of the left   lower lobe. There is heterogeneous and ground-glass opacity within the   aerated portion of the left lower lobe. Opacity is seen within the posterior   left upper lobe which is heterogeneous. Nodular consolidation is seen at the   lingula measuring 2.5 x 1.7 cm. No pneumothorax. Upper Abdomen: Cholelithiasis. Calcified granulomas within the liver and   spleen. Soft Tissues/Bones: Irregular sclerosis and periosteal reaction at the   lateral right 6th rib. Similar finding is seen at the anterior left 3rd rib. Degenerative change of the thoracic spine. Impression   No findings to suggest large central pulmonary embolism as discussed above. Large layering left pleural effusion. Partial atelectasis of the left lower lobe. Additional asymmetric   heterogeneous left-sided opacity can reflect atelectasis, pneumonia, or   asymmetric pulmonary edema. Indeterminate 2.5 cm lingular pulmonary nodule versus rounded atelectasis. Mediastinal and hilar adenopathy is also seen.   Chest CT follow-up after   treatment and resolution of acute symptoms is recommended as malignancy   cannot be excluded. Sclerosis of the lateral right 6th rib and anterior left 3rd rib which could   be due to healing fractures or metastases. Chest x-ray done on 8/10/2022 was personally viewed by me and my interpretation is: Increasing left lower lobe lung opacity most suggestive of developing pneumonia versus recurrent pleural effusion. Mild cardiomegaly. IMPRESSION:     Altered mental status  Acute CVA  A. fib with RVR  Severe ischemic cardiomyopathy  Acute hypoxic respiratory failure  Left-sided pleural effusion status postthoracentesis  Acute kidney injury  Previous history of tobacco and polysubstance abuse  Left lung atelectasis versus infiltrate  Mediastinal/hilar lymphadenopathy    RECOMMENDATION:     Patient's mentation remains poor. MRI brain showing acute embolic CVA. He has had paroxysmal atrial fibrillation. Most likely the source of embolic CVA. Also seen to have left-sided moderate to large pleural effusion. Underwent therapeutic thoracentesis yesterday with 1.5 L of fluid removed 2 days ago. Repeat chest x-ray today shows worsening left-sided infiltrate suggestive for either pneumonia or recurrence of pleural effusion. With ongoing high-grade fevers and worsening leukocytosis will cover for hospital-acquired pneumonia versus aspiration pneumonia. Change IV Unasyn to IV vancomycin and Zosyn. Fluid characteristics are that of a borderline exudate. Pleural fluid cultures and cytology still pending. Blood cultures also pending. If pleural fluid cytology is unable to establish a conclusive diagnosis, patient may eventually need a bronchoscopic evaluation. Currently he is having various cardiovascular and neurological issues which need to be settled before bronchoscopy can be attempted. Patient is chronically ill and now having multiorgan dysfunction. CODE STATUS has been changed to [facility].   Prognosis remains guarded. I will request the palliative care team to discuss patient's status with his designated health proxy. We will continue to follow the patient. Thania Sheffield MD  Pulmonary Critical Care and Sleep Medicine  8/7/2022, 9:46 AM    This note was completed using dragon medical speech recognition software. Grammatical errors, random word insertions, pronoun errors and incomplete sentences are occasional consequences of this technology due to software limitations. If there are questions or concerns about the content of this note of information contained within the body of this dictation they should be addressed with the provider for clarification.

## 2022-08-10 NOTE — PROGRESS NOTES
Pt was discharged with [facility] for inpatient care. Transported via ambulance with both IV in place at request of [address]. Assisted  transport team with moving pt to [place] [address]. Tele box returned to .

## 2022-08-10 NOTE — CARE COORDINATION
Patient discharged 8-10-22 to [facility] at 1:45pm via [facility].    All discharge needs met per case management

## 2022-08-10 NOTE — PROGRESS NOTES
Physical/Occupational Therapy  Zelalem Jolly    Orders received, chart reviewed. Attempted PT/OT evaluation this date. Per RN, pt is on hold d/t catatonia. Third hold. Please re-issue orders when medically appropriate.    Thank you,   Cyndi Wu, OTR/L, [id number]   [address] [facility], DPT [id number]

## 2022-08-10 NOTE — PROGRESS NOTES
[facility]   Progress Note  Cardiology    CC:  Ischemic cardiomyopathy, paroxysmal atrial fib, CVAs    HPI:  Remains unresponsive. No further afib. Medications/Labs all Reviewed    Lab Results   Component Value Date    WBC 16.7 (H) 08/10/2022    HGB 14.6 08/10/2022    HCT 43.3 08/10/2022    .5 (H) 08/10/2022    PLT 52 (L) 08/10/2022     Lab Results   Component Value Date    CREATININE 1.7 (H) 08/10/2022    BUN 42 (H) 08/10/2022     08/10/2022    K 3.9 08/10/2022     (H) 08/10/2022    CO2 18 (L) 08/10/2022     Lab Results   Component Value Date    INR 1.44 (H) 08/07/2022    PROTIME 17.5 (H) 08/07/2022        PHYSICAL EXAM   BP (!) 155/79   Pulse 84   Temp 98.8 °F (37.1 °C) (Axillary)   Resp 18   Ht 5' 9\" (1.753 m)   Wt 168 lb 4.8 oz (76.3 kg)   SpO2 93%   BMI 24.85 kg/m²      Respiratory:  Resp Assessment: Normal respiratory effort  Resp Auscultation: Clear to auscultation bilaterally   Cardiovascular: Auscultation: regular rate and rhythm, normal S1S2, no murmur, rub or gallop  Palpation:  Nl PMI  JVP:  normal  Extremities: No Edema  Abdomen:  Soft, non-tender  Normal bowel sounds  Extremities:   No Cyanosis or Clubbing  Neurological/Psychiatric:  unresponsive  Non-anxious  Skin:   Warm and dry    TELE; (tracings personally reviewed)  Sinus, no further afib    EKG -personally reviewed  Sinus, precordial T wave changes suggestive of ischemia, QTc increase to 487    ECHO   -Moderate-severely diminished left ventricular systolic function with an   estimated ejection fraction of 30-35%. Global hypokinesis with inferior   akinesis. Abnormal motion of the distal septum and apex. -Grade II diastolic dysfunction with elevated LV filling pressures.   -Borderline right ventricular systolic function with a TAPSE of 1.59 cm and   RV s' velocity of 10. 1. cm/s.   -The aortic valve appears sclerotic but there is no significant gradient.   -The mitral valve is thickened with normal excursion. Moderate mitral   annular calcification. Mild to moderate mitral regurgitation. CT HEAD  1. Worsening hemorrhagic transformation of the right occipital lobe infarct   with rupture into the ventricular system and layering hemorrhage in the   occipital horns of the lateral ventricles. 2. Multifocal evolving subacute infarcts are noted in bilateral cerebral and   cerebellar hemispheres. 3. Scattered areas of subarachnoid hemorrhage are noted along the cerebral   convexities, more conspicuous. 4. Thin subdural hemorrhage along the right tentorium is new. 5. Cerebral parenchymal volume loss with chronic microvascular white matter   ischemic disease. ASSESSMENT:     PAF:  Controlled with Amio. Qt increasing  Decrease amio to 200 q day from 400 TID  Cannot anticoagulate with CNS bleed as above  Will call hospitalist about CT to likely stop ASA and plavix     CAD:  Known severe CAD. CTA as above. No clinical evidence of unstable angina. He is not in condition for other ischemic evaluation at this time. EKG with marked T wave cjanges that could be ischemic, but I suspect may be related to CNS bleed     Cardiomyopathy:  Severe LV dysfunction with EF=30-35%. Continue coreg and cozaar. He was also on entresto 24/26 two pills BID at home     Elevated troponin:  No evidence of acute MI. This could be NQWMI related to stress from tachycardia in setting of several CAD. Possible supply demand ischemia or due to CVA's. Medical therapy. No indication for cardiac cath and not a candidate for that at this time. CVA's  CVA's. Likely due to PAF. CT head as above with bleeding. Grim prognosis.  Agree with paliative care and DNR-CCA     Plan:  Decrease amio to 200 q day  Hospitalist paged to discuss  Agree with paliative care  Grim prognosis    Pavan Valle MD, 8/10/2022 12:32 PM

## 2022-08-10 NOTE — PROGRESS NOTES
Pt appears more pale throughout the night. Febrile even with acetaminophen suppository. Pt requiring more oxygen. Increased to 4L NC d/t sats in mid 80s. Had to increase to 5L NC to keep sats at 90%.

## 2022-08-10 NOTE — CONSULTS
PALLIATIVE MEDICINE CONSULTATION     Patient name:William Roth   WIJ:[id number]    :1945  Room/Bed:N-3367/3367-01   LOS: 3 days         Date of consult:8/10/2022    Consult Information  Palliative Medicine Consult performed by: IAN Crouch CNP, CNP    Inpatient consult to Palliative Care  Consult performed by: IAN Crouch CNP  Consult ordered by: Alba Cool MD  Reason for consult: [address] and code status           ASSESSMENT/RECOMMENDATIONS     78 y.o. male with AMS and hypoxia      Symptom Management:  AMS- Pt not responding to verbal stimuli today this is an acute decline  Hypoxia- Pt on 5L has audible rhonchi without stethoscope. Goals of Care- talked to pts Woo Gleason she understands his poor prognosis with acute change in LOC. She feels that comfort care and Hospice IPU is best plan of action with the goal being pts comfort. [facility] referral for ASAP transfer to [facility]. Patient/Family Goals of Care :    talked to pts Woo Gleason she understands his poor prognosis with acute change in LOC. She feels that comfort care and Hospice IPU is best plan of action with the goal being pts comfort. [facility] referral for ASAP transfer to [facility]. Disposition/Discharge Plan:   pending    Advance Directives:  Surrogate Decision Maker: Vita Colorado deferred to due to her personal health care issues to Trevon Rodrigo- room mate   Code status:  DNR-CCA    Case discussed with: patient, floor RN, Dr Opal Pineda, Dr Jaylen Fontanez  Thank you for allowing us to participate in the care of this patient. HISTORY     CC: AMS  HPI: The patient is a 78 y.o. male with PMHx of hypertension,DM, CAD, CHF, COPD who was hospitalized on 2022 with complaints of confusion    Palliative Medicine SymptomScreening/ROS:    Review of Systems   Unable to perform ROS: Patient unresponsive     Patient unable to complete full ROS due to current cognitive status.   Information that is obtained from nursing and chart. Palliative Performance Scale:     [] 60%  Amb reduced; Sig dz. Can't do hobbies/housework; Intake normal or reduced, Occasional assist; LOC full/confusion   [] 50%  Mainly sit/lie; Extensive disease. Mainly assist, Intake normal or reduced; Occasional assist; LOC full/confusion   [] 40%  Mainly in bed; Extensive disease; Mainly assist; Intake normal or reduced; Occasional assist; LOC full/confusion   [] 30%  Bed bound, Extensive disease; Total care; Intake reduced; LOC full/confusion   [x] 20%  Bed bound; Extensive disease; Total care; Intake minimal; Drowsy/coma   [] 10%  Bed bound; Extensive disease; Total care; Mouth care only; Drowsy/coma   []  0%   Death       Home med list and hospital medications reviewed in chart as of 8/10/2022     EXAM     Vitals:    08/10/22 0807   BP:    Pulse: 86   Resp: 20   Temp:    SpO2: 96%       Physical Exam  Constitutional:       Appearance: He is ill-appearing. Comments: unresponsive   HENT:      Head: Normocephalic and atraumatic. Nose: Nose normal.      Mouth/Throat:      Mouth: Mucous membranes are dry. Eyes:      Pupils: Pupils are equal, round, and reactive to light. Cardiovascular:      Rate and Rhythm: Normal rate. Pulses: Normal pulses. Heart sounds: No murmur heard. No gallop. Pulmonary:      Effort: Pulmonary effort is normal.      Breath sounds: Rhonchi present. Abdominal:      General: There is distension. Musculoskeletal:      Cervical back: Neck supple. Right lower leg: Edema present. Left lower leg: Edema present. Skin:     General: Skin is dry. Coloration: Skin is pale.               Current labs in the epic chart reviewed as of 8/10/2022   Review of previous notes, admits, labs, radiology and testing relevant to this consult done in this chart today 8/10/2022      Total time: 70 minutes  >50% of time spent counseling patient at bedside or POA/family member if applicable , reviewing information and discussing care, coordinating with care team  Signed By: Electronically signed by IAN Segal CNP on 8/10/2022 at 9:57 AM  Palliative Medicine   [phone]    August 10, 2022

## 2022-08-10 NOTE — PROGRESS NOTES
g, Daily PRN  acetaminophen (TYLENOL) tablet 650 mg, Q6H PRN   Or  acetaminophen (TYLENOL) suppository 650 mg, Q6H PRN  perflutren lipid microspheres (DEFINITY) injection 1.65 mg, ONCE PRN  atorvastatin (LIPITOR) tablet 80 mg, Nightly  enoxaparin (LOVENOX) injection 40 mg, Daily  clopidogrel (PLAVIX) tablet 75 mg, Daily  dextrose bolus 10% 125 mL, PRN   Or  dextrose bolus 10% 250 mL, PRN  glucagon (rDNA) injection 1 mg, PRN  dextrose 10 % infusion, Continuous PRN  insulin lispro (HUMALOG) injection vial 0-4 Units, Nightly  nitroGLYCERIN (NITROSTAT) SL tablet 0.4 mg, Q5 Min PRN      Objective:  BP (!) 174/87   Pulse 86   Temp 98.2 °F (36.8 °C) (Axillary)   Resp 20   Ht 5' 9\" (1.753 m)   Wt 168 lb 4.8 oz (76.3 kg)   SpO2 96%   BMI 24.85 kg/m²     Intake/Output Summary (Last 24 hours) at 8/10/2022 0940  Last data filed at 8/9/2022 1822  Gross per 24 hour   Intake 706.94 ml   Output --   Net 706.94 ml        Wt Readings from Last 3 Encounters:   08/10/22 168 lb 4.8 oz (76.3 kg)   06/06/22 162 lb (73.5 kg)   06/07/21 176 lb (79.8 kg)       General appearance:  lethargic but opening eyes  HEENT: atraumatic, Pupils equal, muscous membranes moist, no masses appreciated  Cardiovascular: irregualry irerguloar no murmurs appreciated  Respiratory: bilateral crackles  Gastrointestinal: Abdomen soft, non-tender, BS+  EXT: no edema  Neurology: Right facial droop patient I, not withdrawing ext today on exam  Psychiatrylethargic  Skin: Warm, dry, no rashes appreciated    Labs and Tests:  CBC:   Recent Labs     08/08/22 0444 08/09/22  0536 08/10/22  0534   WBC 14.6* 15.4* 16.7*   HGB 14.7 14.1 14.6   PLT 77* 73* 52*       BMP:    Recent Labs     08/08/22 0444 08/09/22  0536 08/10/22  0534    142 145   K 5.0 4.1 3.9    108 111*   CO2 16* 22 18*   BUN 28* 38* 42*   CREATININE 1.6* 1.5* 1.7*   GLUCOSE 380* 172* 205*       Hepatic:   Recent Labs     08/07/22  1104   AST 52*   ALT 17   BILITOT 0.9   ALKPHOS 55 MRI BRAIN WO CONTRAST   Preliminary Result   Multiple areas of restricted diffusion throughout the supratentorial and   infratentorial brain as discussed above suggestive of acute areas of infarct. Metastatic disease is felt to be less likely. Short-term follow-up is   suggested. The marrow signal of the clivus and upper cervical vertebral bodies are low   on T1-weighted images. This is a nonspecific finding. This sometimes can be   normal in a young patient. Sometimes anemia or marrow infiltrative processes   can have this finding. A patient in an immunocompromised state can have this   appearance to the marrow. Clinically correlate. VL DUP CAROTID BILATERAL   Final Result      XR CHEST 1 VIEW   Final Result   No pneumothorax, following left thoracentesis. Small residual left pleural effusion. Basilar atelectasis. IR GUIDED THORACENTESIS PLEURAL   Final Result   Successful ultrasound guided thoracentesis. CT CHEST PULMONARY EMBOLISM W CONTRAST   Final Result   No findings to suggest large central pulmonary embolism as discussed above. Large layering left pleural effusion. Partial atelectasis of the left lower lobe. Additional asymmetric   heterogeneous left-sided opacity can reflect atelectasis, pneumonia, or   asymmetric pulmonary edema. Indeterminate 2.5 cm lingular pulmonary nodule versus rounded atelectasis. Mediastinal and hilar adenopathy is also seen. Chest CT follow-up after   treatment and resolution of acute symptoms is recommended as malignancy   cannot be excluded. Sclerosis of the lateral right 6th rib and anterior left 3rd rib which could   be due to healing fractures or metastases. CT CERVICAL SPINE WO CONTRAST   Final Result   No acute abnormality of the cervical spine. Multilevel degenerative disc disease and facet arthropathy as outlined above. Left pleural effusion.   See CT of the chest of the same date for cva  -  repeat ct head now  - left carotid 50-70 % will need outpatient fu  - likely embolic will need  neuro input on ac      PAF with RVR  - dilt, amio  - cards on board  - ac if ok with neuro    Acute hypoxic resp failure with Sepsis and  possible bacterial pna, and pleural effusion s/p 1.5L removed via thoracentesis  - blood cultures pending-  - change to broad spectrum atbx      Elevated tropoinins: per cards not NSTEMI,  - monitor    Dm2 with hyperglycemia: off insulin pump  - improved    Dysphagia: speech on board dysphagia 3 diet    Alphonso nephro  onboard    Diet: Diet NPO  Code:[id number]        Tressie Blizzard, MD   8/10/2022 9:40 AM

## 2022-08-11 LAB
BODY FLUID CULTURE, STERILE: NORMAL
EKG ATRIAL RATE: 86 BPM
EKG DIAGNOSIS: NORMAL
EKG P AXIS: 42 DEGREES
EKG P-R INTERVAL: 134 MS
EKG Q-T INTERVAL: 406 MS
EKG QRS DURATION: 100 MS
EKG QTC CALCULATION (BAZETT): 485 MS
EKG R AXIS: 65 DEGREES
EKG T AXIS: 118 DEGREES
EKG VENTRICULAR RATE: 86 BPM
GRAM STAIN RESULT: NORMAL

## 2022-08-11 PROCEDURE — 93010 ELECTROCARDIOGRAM REPORT: CPT | Performed by: INTERNAL MEDICINE

## 2022-08-13 LAB
BLOOD CULTURE, ROUTINE: NORMAL
CULTURE, BLOOD 2: NORMAL

## 2022-08-14 NOTE — DISCHARGE SUMMARY
Hospital Medicine Discharge Summary    Patient: Ashley Garcia     Gender: male  : 1945   Age: 78 y.o. 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] 2022     Priority: Medium    Dyslipidemia [E78.5] 2022     Priority: Medium    Peripheral vascular disease ([facility] [I73.9]      Priority: Medium    S/P CABG (coronary artery bypass graft) [Z95.1]      Priority: Medium    Chronic systolic CHF (congestive heart failure) (HCC) [I50.22]      Priority: Medium    Pleural effusion, left [J90]      Priority: Medium    Rib lesion [M89.9]      Priority: Medium    Cerebrovascular accident (CVA) ([facility] [I63.9]      Priority: Medium    Dysarthria [R47.1]      Priority: Medium    Dysphagia [R13.10]      Priority: Medium    NSTEMI (non-ST elevated myocardial infarction) ([facility] [I21.4]      Priority: Medium    CKD (chronic kidney disease) stage 3, GFR 30-59 ml/min ([facility] [N18.30]      Priority: Medium    Acute metabolic encephalopathy [P90.40]      Priority: Medium    Aspiration pneumonia ([facility] [J69.0]      Priority: Medium    Ischemic cardiomyopathy [I25.5]     HTN (hypertension), benign [I10]     Chronic obstructive pulmonary disease ([facility] [J44.9]     Coronary artery disease due to lipid rich plaque [I25.10, I25.83]     Type 2 diabetes mellitus with complication ([facility] [H04.8] 2018    Dehydration [E86.0] 2018    Acute kidney injury superimposed on CKD ([facility] [N17.9, N18.9] 2018 Hemorrhagic cva      Condition at Discharge:  Terminal    Hospital Course: To hospital with acute CVA likely embolic. Also was found to have A. fib with RVR on dill drip level acute hypoxic respiratory with possible bacterial pneumonia and pleural effusion had 1.5 L removed via thoracentesis started on IV antibiotics. Patient's mental status continued to deteriorate and had hemorrhagic conversion of the stroke family decided to pursue hospice    Discharge Exam:    BP (!) 155/79   Pulse 84   Temp 98.8 °F (37.1 °C) (Axillary)   Resp 18   Ht 5' 9\" (1.753 m)   Wt 168 lb 4.8 oz (76.3 kg)   SpO2 93%   BMI 24.85 kg/m²   General appearance:  lethargic today  HEENT: atraumatic, Pupils equal, muscous membranes moist, no masses appreciated  Cardiovascular: irregualry irerguloar no murmurs appreciated  Respiratory: bilateral crackles  Gastrointestinal: Abdomen soft, non-tender, BS+  EXT: no edema  Neurology: Right facial droop patient is withdrawing all extremities to painful stimulus but not following commands  Psychiatry: Appropriate affect. Not agitated  Skin: Warm, dry, no rashes appreciated    Discharge Medications:   Discharge Medication List as of 8/10/2022  1:43 PM        Discharge Medication List as of 8/10/2022  1:43 PM        Discharge Medication List as of 8/10/2022  1:43 PM        CONTINUE these medications which have NOT CHANGED    Details   spironolactone (ALDACTONE) 25 MG tablet Take 25 mg by mouth in the morning. Historical Med      alendronate (FOSAMAX) 70 MG tablet Take 70 mg by mouth every 7 daysHistorical Med      !! furosemide (LASIX) 20 MG tablet Take 20 mg by mouth in the morning and 20 mg before bedtime. Historical Med      !! furosemide (LASIX) 20 MG tablet Take 20 mg by mouth 2 times dailyHistorical Med      albuterol sulfate  (90 Base) MCG/ACT inhaler Inhale 2 puffs into the lungs every 6 hours as needed for Wheezing, Disp-1 Inhaler, R-3Print      losartan (COZAAR) 50 MG tablet Take 1 tablet by mouth daily, Disp-30 tablet, R-3Print      Umeclidinium Bromide 62.5 MCG/INH AEPB Inhale 62.5 mcg into the lungsHistorical Med      budesonide-formoterol (SYMBICORT) 160-4.5 MCG/ACT AERO Inhale 2 puffs into the lungs 2 times daily, Disp-1 Inhaler, R-3Normal      atorvastatin (LIPITOR) 40 MG tablet Take 20 mg by mouth nightlyHistorical Med      Azelastine HCl 137 MCG/SPRAY SOLN 1 spray by Nasal route 2 times daily Indications: 1 spray each nareHistorical Med      carvedilol (COREG) 12.5 MG tablet Take 1 tablet by mouth 2 times daily (with meals), Disp-60 tablet, R-3Normal      alprostadil (EDEX) 40 MCG injection 40 mcg by Intracavitary route as needed for Erectile Dysfunction use no more than 3 times per weekHistorical Med      Insulin Pump Accessories MISC Historical MedBasal 9979-7561= 1.9 units/hr 9238-4209=2.05 units/hr 1330-0187=1.4 units/hr 2844-2074=1.8 units/hr   Carb ratio 5.5 Sens 22:1 Goal 7575-6862= 100-120          0939-8687= 120-140      omeprazole (PRILOSEC) 20 MG delayed release capsule Take 20 mg by mouth dailyHistorical Med      aspirin 81 MG tablet Take 81 mg by mouth dailyHistorical Med      vitamin D (CHOLECALCIFEROL) 1000 UNIT TABS tablet Take 1,000 Units by mouth dailyHistorical Med      dicyclomine (BENTYL) 20 MG tablet Take 20 mg by mouth every 6 hoursHistorical Med       !! - Potential duplicate medications found. Please discuss with provider. Discharge Medication List as of 8/10/2022  1:43 PM          Labs:  For convenience and continuity at follow-up the following most recent labs are provided:    Lab Results   Component Value Date/Time    WBC 16.7 08/10/2022 05:34 AM    HGB 14.6 08/10/2022 05:34 AM    HCT 43.3 08/10/2022 05:34 AM    .5 08/10/2022 05:34 AM    PLT 52 08/10/2022 05:34 AM     08/10/2022 05:34 AM    K 3.9 08/10/2022 05:34 AM     08/10/2022 05:34 AM    CO2 18 08/10/2022 05:34 AM    BUN 42 08/10/2022 05:34 AM    CREATININE 1.7 08/10/2022 05:34 AM    CALCIUM 8.5 08/10/2022 05:34 AM    PHOS 3.6 05/05/2019 05:42 AM    ALKPHOS 55 08/07/2022 11:04 AM    ALT 17 08/07/2022 11:04 AM    AST 52 08/07/2022 11:04 AM    BILITOT 0.9 08/07/2022 11:04 AM    BILIDIR 0.3 08/07/2022 11:04 AM    LABALBU 3.9 08/07/2022 11:04 AM    LDLCALC 68 08/08/2022 04:44 AM    TRIG 149 08/08/2022 04:44 AM     Lab Results   Component Value Date    INR 1.44 (H) 08/07/2022    INR 1.13 05/01/2019    INR 1.18 (H) 04/20/2019       Radiology:  Echo Complete    Result Date: 8/9/2022  Transthoracic Echocardiography Report (TTE)  Demographics   Patient Name       [facility]   Date of Study      08/09/2022        Gender              Male   Patient Number     [id number]        Date of Birth       1945   Visit Number       [id number]         Age                 78 year(s)   Accession Number   [id number]        Room Number         0749   Corporate ID       [id number]          [facility]   Ordering Physician Jeanne Paz MD  Interpreting        Talat Goddard MD,                                       Physician           [facility]  Procedure Type of Study   TTE procedure:ECHOCARDIOGRAM COMPLETE 2D W DOPPLER W COLOR. Procedure Date Date: 08/09/2022 Start: 08:09 AM Study Location: [facility] - Echo Lab Technical Quality: Adequate visualization Indications:Dyspnea/SOB. Patient Status: Routine Height: 69 inches Weight: 173 pounds BSA: 1.94 m2 BMI: 25.55 kg/m2 BP: 116/58 mmHg  Conclusions   Summary  -Moderate-severely diminished left ventricular systolic function with an  estimated ejection fraction of 30-35%. Global hypokinesis with inferior  akinesis. Abnormal motion of the distal septum and apex. -Grade II diastolic dysfunction with elevated LV filling pressures.  -Borderline right ventricular systolic function with a TAPSE of 1.59 cm and  RV s' velocity of 10. 1. cm/s.  -The aortic valve appears sclerotic but there is no significant gradient.  -The mitral valve is thickened with normal excursion. Moderate mitral  annular calcification. Mild to moderate mitral regurgitation. Signature   ------------------------------------------------------------------  Electronically signed by Libia Colvin MD, [facility] (Interpreting  physician) on 08/09/2022 at 10:10 AM  ------------------------------------------------------------------   Findings   Left Ventricle  Normal left ventricular wall thickness and cavity size. Moderate-severely  diminished left ventricular systolic function with an estimated ejection  fraction of 30-35%. Global hypokinesis with inferior akinesis. Abnormal  motion of the distal septum and apex. Avg. E/e'= 12.2. Grade II diastolic dysfunction with elevated LV filling  pressures. Mitral Valve  The mitral valve is thickened with normal excursion. Moderate mitral annular  calcification. Mild to moderate mitral regurgitation. No evidence of mitral  stenosis. Left Atrium  The left atrium is borderline in size. Aortic Valve  The aortic valve appears sclerotic but opens well. No evidence of aortic  insufficiency or stenosis. Aorta  The aortic root is borderline in size. The ascending aorta is normal in size. Right Ventricle  The right ventricle is normal in size. Borderline right ventricular systolic  function with a TAPSE of 1.59 cm and RV s' velocity of 10. 1. cm/s. Tricuspid Valve  Tricuspid valve is structurally normal. Trivial tricuspid regurgitation. Right Atrium  The right atrium is normal in size. Pulmonic Valve  The pulmonic valve is structurally normal. No evidence of pulmonic  insufficiency or stenosis. Pericardial Effusion  No pericardial effusion noted. Pleural Effusion  No pleural effusion. Miscellaneous  The inferior vena cava is not well visualized.   M-Mode/2D Measurements (cm)   LV Diastolic Dimension: 3.22 cm LV Systolic Dimension: 2.78 cm  LV Septum Diastolic: 1.1 cm  LV PW Diastolic: 4.99 cm        AO Root Dimension: 3.9 cm                                  AV Cusp Separation: 1 cm                                  LA Dimension: 4.8 cm  LVOT: 2.1 cm                    LA Area: 21.3 cm2                                  LA volume/Index: 66.5 ml /34 ml/m2  Doppler Measurements   AV Peak Velocity: 120 cm/s    MV Peak E-Wave: 86.4 cm/s  AV Peak Gradient: 5.76 mmHg   MV Peak A-Wave: 110 cm/s  AV Mean Gradient: 3 mmHg      MV E/A Ratio: 0.79  LVOT Peak Velocity: 70 cm/s  AV Area (Continuity):1.93 cm2   E' Septal Velocity: 5.98 cm/s  E' Lateral Velocity: 8.7 cm/s  PV Peak Velocity: 90.3 cm/s  PV Peak Gradient: 3.26 mmHg   Aortic Valve   Peak Velocity: 120 cm/s     Mean Velocity: 85.1 cm/s  Peak Gradient: 5.76 mmHg    Mean Gradient: 3 mmHg  Area (continuity): 1.93 cm2  AV VTI: 28 cm   Cusp Separation: 1 cm  Aorta   Aortic Root: 3.9 cm  Ascending Aorta: 3.6 cm  LVOT Diameter: 2.1 cm      XR ANKLE RIGHT (MIN 3 VIEWS)    Result Date: 8/7/2022  EXAMINATION: THREE XRAY VIEWS OF THE RIGHT ANKLE 8/7/2022 11:28 am COMPARISON: None. HISTORY: ORDERING SYSTEM PROVIDED HISTORY: inj TECHNOLOGIST PROVIDED HISTORY: Reason for exam:->inj Reason for Exam: Fall, right ankle pain FINDINGS: No acute fracture or dislocation. The ankle mortise and talar dome are maintained. No focal soft tissue abnormality. Postoperative clips in the medial aspect of the distal calf. Scattered vascular calcification. No acute osseous abnormality of the right ankle. CT HEAD WO CONTRAST    Result Date: 8/10/2022  EXAMINATION: CT OF THE HEAD WITHOUT CONTRAST,  8/10/2022 10:08 am TECHNIQUE: CT of the head was performed without the administration of intravenous contrast. Automated exposure control, iterative reconstruction, and/or weight based adjustment of the mA/kV was utilized to reduce the radiation dose to as low as reasonably achievable. COMPARISON: 08/08/2022.  08/07/2022.  HISTORY: ORDERING SYSTEM PROVIDED HISTORY:  R/O bleed TECHNOLOGIST PROVIDED HISTORY: Reason for Exam:  R/O bleed Has a \"code stroke\" or \"stroke alert\" been called? No Reason for Exam:  R/O bleed FINDINGS: BRAIN/VENTRICLES: The cerebral and cerebellar parenchyma demonstrate volume loss. There are larger areas of hemorrhage in the right occipital lobe with surrounding areas of edema. The hemorrhage is likely ruptured into the ventricular system with hemorrhage layering in the occipital horns. Scattered areas of subarachnoid hemorrhage are noted along the cerebral convexities. There are evolving subacute infarcts noted in bilateral cerebral and cerebellar hemispheres, most pronounced in the left frontal lobe and right occipital lobe. There are thin areas of subdural hemorrhage layering along the right tentorium measuring 3 mm in thickness, that are new. There is chronic microvascular white matter ischemic disease. No midline shift. The ventricles are enlarged. The basal cisterns are patent. ORBITS: Lens implants from prior cataract surgery are noted. The orbits are otherwise unremarkable. SINUSES: There is scattered moderate paranasal sinus disease with greatest involvement in the right sphenoid sinus, increased in severity. The mastoid air cells are clear. SOFT TISSUES/SKULL:  The calvarium is intact. There is asymmetric posterior left scalp soft tissue swelling. 1. Worsening hemorrhagic transformation of the right occipital lobe infarct with rupture of blood into the ventricular system and layering hemorrhage in the occipital horns of the lateral ventricles. 2. Multifocal evolving subacute infarcts are noted in bilateral cerebral and cerebellar hemispheres. These are likely embolic given the multivessel distribution 3. Scattered areas of subarachnoid hemorrhage are noted along the cerebral convexities, more conspicuous. 4. Thin subdural hemorrhage along the right tentorium is new.  5. Cerebral parenchymal volume loss with chronic microvascular white matter ischemic disease. Results discussed with Dr. Ephraim Leone at 10:38 a.m. on 08/10/2022. CT HEAD WO CONTRAST    Result Date: 8/9/2022  EXAMINATION: CT OF THE HEAD WITHOUT CONTRAST  8/7/2022 11:40 am TECHNIQUE: CT of the head was performed without the administration of intravenous contrast. Automated exposure control, iterative reconstruction, and/or weight based adjustment of the mA/kV was utilized to reduce the radiation dose to as low as reasonably achievable. COMPARISON: Prior studies most recent 07/15/2020. HISTORY: ORDERING SYSTEM PROVIDED HISTORY: ams TECHNOLOGIST PROVIDED HISTORY: Has a \"code stroke\" or \"stroke alert\" been called? ->No Reason for exam:->ams Decision Support Exception - unselect if not a suspected or confirmed emergency medical condition->Emergency Medical Condition (MA) Reason for Exam: ams FINDINGS: BRAIN/VENTRICLES: The ventricular system is enlarged but unchanged. No evidence of mass effect or midline shift. Prominence of sulci overlying convexities of cerebral hemispheres and cerebellum consistent with atrophy. Foci of abnormal low attenuation within periventricular/subcortical white matter are nonspecific however likely on the basis of changes of mild ischemic leukoencephalopathy. There are subtle zones of abnormal low attenuation in the posterior left frontal lobe (image 49), parasagittal left parietal lobe (image 51), posterior right occipital lobe (image 31) and posterior left cerebellar hemisphere (image 18). Findings raise the possibility of presence of multifocal areas of acute/recent ischemic change. Bilaterality and multiplicity raise the possibility of change associated with underlying embolic phenomenon. No abnormal extra-axial fluid collection is identified. There is atherosclerotic calcification of distal internal carotid and right vertebral arteries. Right vertebral artery dominant vertebrobasilar system is identified.  ORBITS: The visualized portion of the orbits demonstrate no acute abnormality. SINUSES: The visualized paranasal sinuses and mastoid air cells demonstrate no acute abnormality. There is mucosal thickening within several bilateral ethmoid air cells and right sphenoid locule. Partial visualization of rounded low-attenuation lesion within the right maxillary sinus suggests presence of mucous retention cyst or polyp. SOFT TISSUES/SKULL:  No acute abnormality of the visualized skull or soft tissues. Area of asymmetric intermediate attenuation within subcutaneous soft tissues in the left posterior parietal region is unchanged (image 41). Focal areas of abnormal low attenuation involving left frontal, left parietal, right occipital, and left cerebellar regions. Findings are suspicious representing foci of acute/recent ischemic change. Multiplicity and bilaterality raise the possibility of change associated with underlying embolic phenomenon. Follow-up MRI examination with diffusion imaging is recommended for a more complete evaluation. Critical results were called by Dr. Lamonte Butts. Leland Richard MD to PALMA Cummings on 8/7/2022 at 12:18. CT CERVICAL SPINE WO CONTRAST    Result Date: 8/7/2022  EXAMINATION: CT OF THE CERVICAL SPINE WITHOUT CONTRAST 8/7/2022 11:41 am TECHNIQUE: CT of the cervical spine was performed without the administration of intravenous contrast. Multiplanar reformatted images are provided for review. Automated exposure control, iterative reconstruction, and/or weight based adjustment of the mA/kV was utilized to reduce the radiation dose to as low as reasonably achievable. COMPARISON: None.  HISTORY: ORDERING SYSTEM PROVIDED HISTORY: fall TECHNOLOGIST PROVIDED HISTORY: Reason for exam:->fall Decision Support Exception - unselect if not a suspected or confirmed emergency medical condition->Emergency Medical Condition (MA) Reason for Exam: fall FINDINGS: BONES/ALIGNMENT: There is no acute fracture or traumatic malalignment. DEGENERATIVE CHANGES: Moderate degenerative disc disease at C3-4 and C6-7 with disc space narrowing and spurring. Spurring at the other cervical vertebral levels anteriorly. Multilevel facet arthropathy, right greater than left. Moderate degenerative change at the C1-2 articulation anteriorly. SOFT TISSUES: There is no prevertebral soft tissue swelling. Calcified plaque at the level the carotid bifurcation bilaterally. Partial visualization of a significant left pleural effusion. CT of the chest is pending. No acute abnormality of the cervical spine. Multilevel degenerative disc disease and facet arthropathy as outlined above. Left pleural effusion. See CT of the chest of the same date for complete details. XR CHEST PORTABLE    Result Date: 8/10/2022  EXAMINATION: ONE XRAY VIEW OF THE CHEST 8/10/2022 7:58 am COMPARISON: 08/08/2022 HISTORY: ORDERING SYSTEM PROVIDED HISTORY: dyspnea TECHNOLOGIST PROVIDED HISTORY: Reason for exam:->dyspnea Reason for Exam: dyspnea FINDINGS: Heart and mediastinal contours are stable. Scattered heterogeneous opacity is seen throughout the lungs bilaterally, increased bilaterally, particularly on the left, where superimposed pleural effusion is suspected. Clips project over the right scapular region Spurring is seen in the spine. Increased pleuroparenchymal disease, left greater than right     XR CHEST PORTABLE    Result Date: 8/7/2022  EXAMINATION: ONE XRAY VIEW OF THE CHEST 8/7/2022 11:09 am COMPARISON: 07/15/2020. HISTORY: ORDERING SYSTEM PROVIDED HISTORY: altered mental status TECHNOLOGIST PROVIDED HISTORY: Reason for exam:->altered mental status FINDINGS: The cardiac silhouette is enlarged and stable with post sternotomy changes. Vascular congestion with perihilar edema consistent with CHF. Moderate left pleural effusion with confluent left lower lobe opacification. No significant right pleural effusion.   Postoperative clips in the right axillary region. Central vascular congestion with perihilar edema consistent with CHF. Left pleural effusion with dependent opacification, possibly atelectasis or infiltrate. XR CHEST 1 VIEW    Result Date: 8/8/2022  EXAMINATION: ONE XRAY VIEW OF THE CHEST 8/8/2022 2:55 pm COMPARISON: CTA PA, 08/07/2022 Ultrasound-guided left thora centesis, less than 1 hour ago HISTORY: ORDERING SYSTEM PROVIDED HISTORY: post thora TECHNOLOGIST PROVIDED HISTORY: Reason for exam:->post thora FINDINGS: Sternotomy wires are present. The cardiac silhouette is within normal limits. Pulmonary vessels are mildly prominent. This left pleural effusion is reduced to small. There is mild left basilar atelectasis. No pneumothorax is seen. No pneumothorax, following left thoracentesis. Small residual left pleural effusion. Basilar atelectasis. CT CHEST PULMONARY EMBOLISM W CONTRAST    Result Date: 8/7/2022  EXAMINATION: CTA OF THE CHEST 8/7/2022 11:51 am TECHNIQUE: CTA of the chest was performed after the administration of intravenous contrast.  Multiplanar reformatted images are provided for review. MIP images are provided for review. Automated exposure control, iterative reconstruction, and/or weight based adjustment of the mA/kV was utilized to reduce the radiation dose to as low as reasonably achievable.  COMPARISON: CT chest dated 05/01/2019 HISTORY: ORDERING SYSTEM PROVIDED HISTORY: elevated trop - ro PE TECHNOLOGIST PROVIDED HISTORY: Reason for exam:->elevated trop - ro PE Decision Support Exception - unselect if not a suspected or confirmed emergency medical condition->Emergency Medical Condition (MA) Reason for Exam: Altered Mental Status (Pt with AMS x few days per family, in by EMS, pt alert to self and place, disorientated to situation or time, pt inuslin pump per EMS pt has been having trouble, bs 200s) FINDINGS: Pulmonary Arteries: Within the main, central most right, central most left pulmonary arteries, no evidence of filling defect to suggest large central pulmonary embolism. Evaluation beyond the level of the suyapa is nondiagnostic secondary to marked artifact. Mediastinum: Status post median sternotomy. Calcification of the thoracic aorta. No aortic intraluminal flap. Coronary artery calcification. Mediastinal and bilateral hilar adenopathy is demonstrated. Pretracheal lymph node for example measures approximately 1.4 cm in short axis. Precarinal lymph node is 1.4 cm. Right hilar lymph node for example measures 3.1 x 2.3 cm. Thyroid is symmetric. No axillary adenopathy. Lungs/pleura: Large layering left pleural effusion is seen. Emphysema is present. Respiratory motion artifact noted. Partial atelectasis of the left lower lobe. There is heterogeneous and ground-glass opacity within the aerated portion of the left lower lobe. Opacity is seen within the posterior left upper lobe which is heterogeneous. Nodular consolidation is seen at the lingula measuring 2.5 x 1.7 cm. No pneumothorax. Upper Abdomen: Cholelithiasis. Calcified granulomas within the liver and spleen. Soft Tissues/Bones: Irregular sclerosis and periosteal reaction at the lateral right 6th rib. Similar finding is seen at the anterior left 3rd rib. Degenerative change of the thoracic spine. No findings to suggest large central pulmonary embolism as discussed above. Large layering left pleural effusion. Partial atelectasis of the left lower lobe. Additional asymmetric heterogeneous left-sided opacity can reflect atelectasis, pneumonia, or asymmetric pulmonary edema. Indeterminate 2.5 cm lingular pulmonary nodule versus rounded atelectasis. Mediastinal and hilar adenopathy is also seen. Chest CT follow-up after treatment and resolution of acute symptoms is recommended as malignancy cannot be excluded. Sclerosis of the lateral right 6th rib and anterior left 3rd rib which could be due to healing fractures or metastases.      VL DUP CAROTID BILATERAL    Result Date: 8/9/2022  Carotid Duplex Study  Demographics   Patient Name       [facility]   Date of Study      08/08/2022         Gender              Male   Patient Number     [id number]         Date of Birth       1945   Visit Number       [id number]          Age                 78 year(s)   Accession Number   [id number]         Room Number         2477   Corporate ID       [id number]           Sonographer         Randy Palma,                                                            RVT, RT   Ordering Physician Mag Cohen, Interpreting        [facility] Vascular                     MD                 Physician           Cinda Ivey MD,                                                            [facility]  Procedure Type of Study:   Cerebral:Carotid, VL CAROTID DUPLEX BILATERAL. Vascular Sonographer Report  Impressions Right Impression The right internal carotid artery appears to have a <50% diameter reducing stenosis based on velocity criteria. The right vertebral artery demonstrates normal antegrade flow. The right subclavian artery is visualized and demonstrates multiphasic flow. Heterogeneous plaque is noted. Left Impression Limited to internal, external and common carotid arteries due to patient cooperation. The left internal carotid artery appears to have a 50-69% diameter reducing stenosis based on velocity criteria. The left vertebral and subclavian were not obtained due to patient cooperation. Heterogeneous, calcified plaque is noted. Conclusions   Summary   -The right internal carotid artery appears to have a <50% diameter reducing  stenosis based on velocity criteria. -The left internal carotid artery appears to have a 50-69% diameter reducing  stenosis based on velocity criteria. Recommendations   -Recommend follow up study in 6 months.    Signature   ------------------------------------------------------------------  Electronically signed by Cinda Ivey MD, [facility] (Interpreting  physician) on 08/09/2022 at 03:05 PM  ------------------------------------------------------------------  Patient Status:Routine. [address] [place] [address] Vascular Lab. Technical Quality:Limited visualization due to combative patient. Velocities are measured in cm/s ; Diameters are measured in mm Carotid Right Measurements +---------+----+----+-----+----+ ! Location ! PSV ! EDV ! Angle! RI  ! +---------+----+----+-----+----+ ! Prox CCA !74.6!11. 2!60   !0.85! +---------+----+----+-----+----+ ! Mid CCA  !72.7!3.73!60   !0.95! +---------+----+----+-----+----+ ! Dist CCA !72. 1!3.11! 60   !0.96! +---------+----+----+-----+----+ ! Prox ICA !126 ! 15. 4!60   !0.88! +---------+----+----+-----+----+ ! Mid ICA  !139 !24. 1! 60   !0.83! +---------+----+----+-----+----+ ! Prox ECA !127 !19. 8!60   !0.84! +---------+----+----+-----+----+ ! Vertebral!64.6!11. 2!60   !0.83! +---------+----+----+-----+----+   - There is antegrade vertebral flow noted on the right side. - Additional Measurements:ICAPSV/CCAPSV 1.91. ICAEDV/CCAEDV 2.15. Carotid Left Measurements +--------+----+----+-----+----+ ! Location! PSV ! EDV ! Angle! RI  ! +--------+----+----+-----+----+ ! Prox CCA!103 !3.29! 60   !0.97! +--------+----+----+-----+----+ ! Mid CCA !92. 2!17. 6! 60   !0.81! +--------+----+----+-----+----+ ! Bulb    !72  !10  !     !0.86! +--------+----+----+-----+----+ ! Prox ICA!136 !20. 6! 60   !0.85! +--------+----+----+-----+----+ ! Mid ICA !215 !19. 2! 60   !0.91! +--------+----+----+-----+----+ ! Dist ICA!202 !22  !     !0.89! +--------+----+----+-----+----+ ! Prox AQT!531 !    !60   !    ! +--------+----+----+-----+----+   - Additional Measurements:ICAPSV/CCAPSV 2.33. ICAEDV/CCAEDV 6.69. MRI BRAIN WO CONTRAST    Result Date: 8/10/2022  EXAMINATION: MRI OF THE BRAIN WITHOUT CONTRAST  8/8/2022 7:07 pm TECHNIQUE: Multiplanar multisequence MRI of the brain was performed without the administration of intravenous contrast. COMPARISON: None.  HISTORY: ORDERING SYSTEM PROVIDED HISTORY: Dysathria, r/o metastatic disease vs embolic strokes TECHNOLOGIST PROVIDED HISTORY: Reason for exam:->Dysathria, r/o metastatic disease vs embolic strokes Reason for Exam: Dysathria, r/o metastatic disease vs embolic strokes. Initial evaluation FINDINGS: INTRACRANIAL STRUCTURES/VENTRICLES: There are multiple areas of restricted diffusion involving the cerebellar hemispheres bilaterally, the right occipital lobe, the left parietal lobe, and both frontal lobes. These are highly suspicious for acute areas of infarct. An embolic source should be considered. No contrast was administered. Metastatic disease is felt to be less likely but is difficult to entirely exclude. Short-term follow-up could be considered. There is cerebral atrophy and chronic small vessel ischemic disease. There is no midline shift or mass effect. ORBITS: The visualized portion of the orbits demonstrate no acute abnormality. SINUSES: There is a mucous retention cyst or polyp in the right maxillary sinus. There is mild mucoperiosteal thickening of the ethmoid air cells. The remainder of the sinuses are clear. There is partial opacification of the mastoid air cells bilaterally, right worse than left. BONES/SOFT TISSUES: No acute abnormality of the visualized soft tissues. The marrow signal of the clivus and upper cervical vertebral bodies are low on T1-weighted images. This is a nonspecific finding. This sometimes can be normal in a young patient. Sometimes anemia or marrow infiltrative processes can have this finding. A patient in an immunocompromised state can have this appearance to the marrow. Clinically correlate. Multiple areas of restricted diffusion throughout the supratentorial and infratentorial brain as discussed above suggestive of acute areas of infarct. Metastatic disease is felt to be less likely. Short-term follow-up is suggested.  The marrow signal of the clivus and upper cervical vertebral bodies are low on T1-weighted images. This is a nonspecific finding. This sometimes can be normal in a young patient. Sometimes anemia or marrow infiltrative processes can have this finding. A patient in an immunocompromised state can have this appearance to the marrow. Clinically correlate. IR GUIDED THORACENTESIS PLEURAL    Result Date: 8/8/2022  PROCEDURE: Ignacio Alejandro LEFT THORACENTESIS 8/8/2022 HISTORY: ORDERING SYSTEM PROVIDED HISTORY: left pleural effusion TECHNOLOGIST PROVIDED HISTORY: Reason for exam:->left pleural effusion Which side should the procedure be performed? ->Left TECHNIQUE: Informed consent was obtained after a detailed explanation of the procedure including risks, benefits, and alternatives. Universal protocol was performed. The patient was positioned right lateral decubitus. The left chest was prepped and draped in sterile fashion and local anesthesia was achieved with lidocaine. A 5 Turkish needle sheath was advanced under ultrasound guidance into pleural effusion and thoracentesis was performed. The patient tolerated the procedure well. FINDINGS: A total of 1.5 L of serous fluid was removed. Successful ultrasound guided thoracentesis.          Signed:    Luis A Anderson MD   8/14/2022

## 2022-09-06 PROBLEM — E86.0 DEHYDRATION: Status: RESOLVED | Noted: 2018-06-08 | Resolved: 2022-09-06

## 2022-09-07 PROBLEM — R77.8 ELEVATED TROPONIN: Status: RESOLVED | Noted: 2022-08-08 | Resolved: 2022-09-07

## 2022-11-02 NOTE — DISCHARGE SUMMARY
Hospital Medicine Discharge Summary    Patient: Dasha Albert     Gender: male  : 1945   Age: 69 y.o. MRN: [id number]    Admitting Physician: Trevon Orlando MD  Discharge Physician: Lo Elliott MD     Code Status: Prior     Admit Date: 2019   Discharge Date: 2019 [facility] . Disposition:  Home    Discharge Diagnoses: Active Hospital Problems    Diagnosis Date Noted    Multifocal pneumonia [J18.9] 2019       Follow-up appointments:  one week    Outpatient to do list: follow up    Condition at Discharge:  [facility] [address] Course:      1 Acute multifocal community acquired bacterial pneumonia with early  Sepsis. - on appropriate abx  -     2. Acute respiratory failure with hypoxia  - secondary to above. - continue oxygen  - will consult pulm re additional medications.      3. Coronary artery disease.   - on home meds      4. Stage 3 chronic kidney disease.  - monitor renal function in house     5. Diabetes mellitus type 2 on insulin pump. - monitor sugars     6. Dyslipidemia. -statin       7.  Hypertension  - resume home meds     He needs home oxygen and this is being arranged by the [place] so he did leave until the  despite having been discharged on the   He is stable for discharge home with home oxygen           Discharge Medications:   Discharge Medication List as of 2019  3:52 PM      START taking these medications    Details   budesonide-formoterol (SYMBICORT) 160-4.5 MCG/ACT AERO Inhale 2 puffs into the lungs 2 times daily, Disp-1 Inhaler, R-3Normal      predniSONE (DELTASONE) 20 MG tablet Take 2 tablets by mouth daily for 5 days, Disp-20 tablet, R-0Normal      tiotropium (Maddy Ivans) 18 MCG inhalation capsule Inhale 1 capsule into the lungs daily, Disp-90 capsule, R-1Normal           Discharge Medication List as of 2019  3:52 PM        Discharge Medication List as of 4/26/2019  3:52 PM      CONTINUE these medications which have NOT CHANGED    Details   atorvastatin (LIPITOR) 40 MG tablet Take 20 mg by mouth nightlyHistorical Med      Azelastine HCl 137 MCG/SPRAY SOLN 1 spray by Nasal route 2 times daily Indications: 1 spray each nareHistorical Med      carvedilol (COREG) 12.5 MG tablet Take 1 tablet by mouth 2 times daily (with meals), Disp-60 tablet, R-3Normal      amLODIPine (NORVASC) 10 MG tablet Take 1 tablet by mouth daily, Disp-30 tablet, R-3Normal      alprostadil (EDEX) 40 MCG injection 40 mcg by Intracavitary route as needed for Erectile Dysfunction use no more than 3 times per weekHistorical Med      Insulin Pump Accessories MISC Historical MedBasal 1079-9290= 1.9 units/hr 7695-9509=2.05 units/hr 8637-5859=1.4 units/hr 7700-5936=1.8 units/hr   Carb ratio 5.5 Sens 22:1 Goal 6767-1740= 100-120          2635-0506= 120-140      omeprazole (PRILOSEC) 20 MG delayed release capsule Take 20 mg by mouth dailyHistorical Med      aspirin 81 MG tablet Take 81 mg by mouth dailyHistorical Med      vitamin D (CHOLECALCIFEROL) 1000 UNIT TABS tablet Take 1,000 Units by mouth dailyHistorical Med      dicyclomine (BENTYL) 20 MG tablet Take 20 mg by mouth every 6 hoursHistorical Med           Discharge Medication List as of 4/26/2019  3:52 PM      STOP taking these medications       moxifloxacin (AVELOX) 400 MG tablet Comments:   Reason for Stopping:         pregabalin (LYRICA) 150 MG capsule Comments:   Reason for Stopping:         simvastatin (ZOCOR) 80 MG tablet Comments:   Reason for Stopping:         metFORMIN (GLUCOPHAGE) 500 MG tablet Comments:   Reason for Stopping:               Discharge ROS:  A complete review of systems was asked and negative      Discharge Exam:    /66   Pulse 61   Temp 98 °F (36.7 °C) (Oral)   Resp 18   Ht 5' 9\" (1.753 m)   Wt 161 lb 1.6 oz (73.1 kg)   SpO2 93%   BMI 23.79 kg/m²      General appearance: No apparent distress, appears stated age and cooperative. HEENT: Pupils equal, round, and reactive to light. Conjunctivae/corneas clear. Neck: Supple, with full range of motion. No jugular venous distention. Trachea midline. Respiratory:  Normal respiratory effort. Clear to auscultation, bilaterally without Rales/Wheezes/Rhonchi. Cardiovascular: Regular rate and rhythm with normal S1/S2 without murmurs, rubs or gallops. Abdomen: Soft, non-tender, non-distended with normal bowel sounds. Musculoskeletal: No clubbing, cyanosis or edema bilaterally. Full range of motion without deformity. Skin: Skin color, texture, turgor normal.  No rashes or lesions. Neurologic:  Neurovascularly intact without any focal sensory/motor deficits. Cranial nerves: II-XII intact, grossly non-focal.  Psychiatric: Alert and oriented, thought content appropriate, normal insight  Capillary Refill: Brisk,< 3 seconds   Peripheral Pulses: +2 palpable, equal bilaterally           Labs: For convenience and continuity at follow-up the following most recent labs are provided:    Lab Results   Component Value Date    WBC 7.3 05/05/2019    HGB 11.8 05/05/2019    HCT 34.7 05/05/2019    MCV 95.8 05/05/2019     05/05/2019     05/07/2019    K 4.8 05/07/2019    K 4.9 05/02/2019     05/07/2019    CO2 27 05/07/2019    BUN 37 05/07/2019    CREATININE 1.4 05/07/2019    CALCIUM 9.3 05/07/2019    PHOS 3.6 05/05/2019    ALKPHOS 77 05/01/2019    ALT 37 05/01/2019    AST 30 05/01/2019    BILITOT 1.0 05/01/2019    BILIDIR 0.3 03/13/2019    LABALBU 3.1 05/05/2019     Lab Results   Component Value Date    INR 1.13 05/01/2019    INR 1.18 (H) 04/20/2019    INR 1.15 (H) 03/08/2019       Radiology:  No results found. The patient was seen and examined on day of discharge and this discharge summary is in conjunction with any daily progress note from day of discharge. Time Spent on discharge is 30 minutes  in the examination, evaluation, counseling and review of medications and discharge plan. Note that greater  than 30 minutes was spent in preparing discharge papers, discussing discharge with patient, medication review, etc.       Signed:    Abebe Gruber MD   6/5/2019      Thank you [facility] Moon [phone] for the opportunity to be involved in this patient's care.  If you have any questions or concerns please feel free to contact me at [phone] ADMIT